# Patient Record
Sex: FEMALE | Race: WHITE | NOT HISPANIC OR LATINO | Employment: OTHER
[De-identification: names, ages, dates, MRNs, and addresses within clinical notes are randomized per-mention and may not be internally consistent; named-entity substitution may affect disease eponyms.]

---

## 2017-02-08 ENCOUNTER — CHARTING TRANS (OUTPATIENT)
Dept: OTHER | Age: 82
End: 2017-02-08

## 2017-02-08 ENCOUNTER — HOSPITAL (OUTPATIENT)
Dept: OTHER | Age: 82
End: 2017-02-08
Attending: EMERGENCY MEDICINE

## 2017-02-08 ENCOUNTER — IMAGING SERVICES (OUTPATIENT)
Dept: OTHER | Age: 82
End: 2017-02-08

## 2017-02-08 LAB
ANALYZER ANC (IANC): ABNORMAL
ANION GAP SERPL CALC-SCNC: 14 MMOL/L (ref 10–20)
BASOPHILS # BLD: 0 THOUSAND/MCL (ref 0–0.3)
BASOPHILS NFR BLD: 0 %
BUN SERPL-MCNC: 34 MG/DL (ref 10–20)
BUN/CREAT SERPL: 32 (ref 7–25)
CALCIUM SERPL-MCNC: 10.1 MG/DL (ref 8.4–10.2)
CHLORIDE: 103 MMOL/L (ref 98–107)
CO2 SERPL-SCNC: 31 MMOL/L (ref 21–32)
CREAT SERPL-MCNC: 1.07 MG/DL (ref 0.51–0.95)
DIFFERENTIAL METHOD BLD: ABNORMAL
EOSINOPHIL # BLD: 0.1 THOUSAND/MCL (ref 0.1–0.5)
EOSINOPHIL NFR BLD: 2 %
ERYTHROCYTE [DISTWIDTH] IN BLOOD: 14 % (ref 11–15)
GLUCOSE SERPL-MCNC: 108 MG/DL (ref 65–99)
HEMATOCRIT: 47.8 % (ref 36–46.5)
HGB BLD-MCNC: 15.6 GM/DL (ref 12–15.5)
LYMPHOCYTES # BLD: 1.8 THOUSAND/MCL (ref 1–4)
LYMPHOCYTES NFR BLD: 36 %
MCH RBC QN AUTO: 30.5 PG (ref 26–34)
MCHC RBC AUTO-ENTMCNC: 32.6 GM/DL (ref 32–36.5)
MCV RBC AUTO: 93.5 FL (ref 78–100)
MONOCYTES # BLD: 0.4 THOUSAND/MCL (ref 0.3–0.9)
MONOCYTES NFR BLD: 8 %
NEUTROPHILS # BLD: 2.7 THOUSAND/MCL (ref 1.8–7.7)
NEUTROPHILS NFR BLD: 54 %
NEUTS SEG NFR BLD: ABNORMAL %
PERCENT NRBC: ABNORMAL
PLATELET # BLD: 176 THOUSAND/MCL (ref 140–450)
POTASSIUM SERPL-SCNC: 3.8 MMOL/L (ref 3.4–5.1)
RBC # BLD: 5.11 MILLION/MCL (ref 4–5.2)
SODIUM SERPL-SCNC: 144 MMOL/L (ref 135–145)
TROPONIN I SERPL HS-MCNC: <0.02 NG/ML
TROPONIN I SERPL HS-MCNC: <0.02 NG/ML
WBC # BLD: 5.1 THOUSAND/MCL (ref 4.2–11)

## 2017-02-09 ENCOUNTER — DIAGNOSTIC TRANS (OUTPATIENT)
Dept: OTHER | Age: 82
End: 2017-02-09

## 2017-04-06 ENCOUNTER — CHARTING TRANS (OUTPATIENT)
Dept: OTHER | Age: 82
End: 2017-04-06

## 2017-04-18 ENCOUNTER — HOSPITAL ENCOUNTER (OUTPATIENT)
Dept: GENERAL RADIOLOGY | Facility: HOSPITAL | Age: 82
Discharge: HOME | End: 2017-04-18
Attending: FAMILY MEDICINE
Payer: MEDICARE

## 2017-04-18 ENCOUNTER — HOSPITAL ENCOUNTER (OUTPATIENT)
Dept: ULTRASOUND IMAGING | Facility: HOSPITAL | Age: 82
Discharge: HOME | End: 2017-04-18
Attending: FAMILY MEDICINE
Payer: MEDICARE

## 2017-04-18 ENCOUNTER — HOSPITAL ENCOUNTER (OUTPATIENT)
Dept: CV DIAGNOSTICS | Facility: HOSPITAL | Age: 82
Discharge: HOME | End: 2017-04-18
Attending: FAMILY MEDICINE
Payer: MEDICARE

## 2017-04-18 DIAGNOSIS — G89.29 CHRONIC HEADACHES: ICD-10-CM

## 2017-04-18 DIAGNOSIS — R06.02 BREATH SHORTNESS: ICD-10-CM

## 2017-04-18 DIAGNOSIS — K44.9 HIATAL HERNIA: ICD-10-CM

## 2017-04-18 DIAGNOSIS — I95.1 ORTHOSTATIC HYPOTENSION: ICD-10-CM

## 2017-04-18 DIAGNOSIS — I65.23 CAROTID ATHEROSCLEROSIS, BILATERAL: ICD-10-CM

## 2017-04-18 DIAGNOSIS — R51.9 CHRONIC HEADACHES: ICD-10-CM

## 2017-04-18 DIAGNOSIS — R06.00 DOE (DYSPNEA ON EXERTION): ICD-10-CM

## 2017-04-18 PROCEDURE — 93306 TTE W/DOPPLER COMPLETE: CPT | Performed by: INTERNAL MEDICINE

## 2017-04-18 PROCEDURE — 93880 EXTRACRANIAL BILAT STUDY: CPT

## 2017-04-18 PROCEDURE — 71020 XR CHEST PA + LAT CHEST (CPT=71020): CPT

## 2017-04-18 PROCEDURE — 93306 TTE W/DOPPLER COMPLETE: CPT

## 2017-10-09 ENCOUNTER — LAB SERVICES (OUTPATIENT)
Dept: OTHER | Age: 82
End: 2017-10-09

## 2017-10-09 ENCOUNTER — CHARTING TRANS (OUTPATIENT)
Dept: OTHER | Age: 82
End: 2017-10-09

## 2017-10-10 LAB
ALBUMIN SERPL-MCNC: 3.9 G/DL (ref 3.6–5.1)
ALBUMIN/GLOB SERPL: 1.1 (ref 1–2.4)
ALP SERPL-CCNC: 92 UNITS/L (ref 45–117)
ALT SERPL-CCNC: 19 UNITS/L
ANION GAP SERPL CALC-SCNC: 21 MMOL/L (ref 10–20)
AST SERPL-CCNC: 29 UNITS/L
BILIRUB SERPL-MCNC: 0.5 MG/DL (ref 0.2–1)
BUN SERPL-MCNC: 33 MG/DL (ref 6–20)
BUN/CREAT SERPL: 32 (ref 7–25)
CALCIUM SERPL-MCNC: 9.6 MG/DL (ref 8.4–10.2)
CHLORIDE SERPL-SCNC: 104 MMOL/L (ref 98–107)
CHOLEST SERPL-MCNC: 228 MG/DL
CHOLEST/HDLC SERPL: 3.4
CO2 SERPL-SCNC: 21 MMOL/L (ref 21–32)
CREAT SERPL-MCNC: 1.02 MG/DL (ref 0.51–0.95)
GLOBULIN SER-MCNC: 3.5 G/DL (ref 2–4)
GLUCOSE SERPL-MCNC: 97 MG/DL (ref 65–99)
HDLC SERPL-MCNC: 68 MG/DL
LDLC SERPL CALC-MCNC: 123 MG/DL
LENGTH OF FAST TIME PATIENT: 10 HRS
LENGTH OF FAST TIME PATIENT: 10 HRS
NONHDLC SERPL-MCNC: 160 MG/DL
POTASSIUM SERPL-SCNC: 4.4 MMOL/L (ref 3.4–5.1)
SODIUM SERPL-SCNC: 142 MMOL/L (ref 135–145)
TOTAL PROTEIN: 7.4 G/DL (ref 6.4–8.2)
TRIGL SERPL-MCNC: 184 MG/DL

## 2017-11-13 ENCOUNTER — LAB SERVICES (OUTPATIENT)
Dept: OTHER | Age: 82
End: 2017-11-13

## 2017-11-13 ENCOUNTER — CHARTING TRANS (OUTPATIENT)
Dept: OTHER | Age: 82
End: 2017-11-13

## 2017-11-14 ENCOUNTER — CHARTING TRANS (OUTPATIENT)
Dept: OTHER | Age: 82
End: 2017-11-14

## 2017-11-14 LAB
ALBUMIN SERPL-MCNC: 3.7 G/DL (ref 3.6–5.1)
ALBUMIN/GLOB SERPL: 1 (ref 1–2.4)
ALP SERPL-CCNC: 86 UNITS/L (ref 45–117)
ALT SERPL-CCNC: 21 UNITS/L
ANION GAP SERPL CALC-SCNC: 14 MMOL/L (ref 10–20)
AST SERPL-CCNC: 24 UNITS/L
BILIRUB SERPL-MCNC: 0.4 MG/DL (ref 0.2–1)
BUN SERPL-MCNC: 32 MG/DL (ref 6–20)
BUN/CREAT SERPL: 24 (ref 7–25)
CALCIUM SERPL-MCNC: 9.5 MG/DL (ref 8.4–10.2)
CHLORIDE SERPL-SCNC: 99 MMOL/L (ref 98–107)
CO2 SERPL-SCNC: 32 MMOL/L (ref 21–32)
CREAT SERPL-MCNC: 1.32 MG/DL (ref 0.51–0.95)
GLOBULIN SER-MCNC: 3.7 G/DL (ref 2–4)
GLUCOSE SERPL-MCNC: 106 MG/DL (ref 65–99)
LENGTH OF FAST TIME PATIENT: 0 HRS
POTASSIUM SERPL-SCNC: 4.5 MMOL/L (ref 3.4–5.1)
SODIUM SERPL-SCNC: 140 MMOL/L (ref 135–145)
TOTAL PROTEIN: 7.4 G/DL (ref 6.4–8.2)

## 2017-11-21 ENCOUNTER — CHARTING TRANS (OUTPATIENT)
Dept: OTHER | Age: 82
End: 2017-11-21

## 2017-11-25 ENCOUNTER — CHARTING TRANS (OUTPATIENT)
Dept: OTHER | Age: 82
End: 2017-11-25

## 2017-11-27 ENCOUNTER — CHARTING TRANS (OUTPATIENT)
Dept: OTHER | Age: 82
End: 2017-11-27

## 2017-12-18 ENCOUNTER — CHARTING TRANS (OUTPATIENT)
Dept: OTHER | Age: 82
End: 2017-12-18

## 2018-01-25 ENCOUNTER — HOSPITAL ENCOUNTER (OUTPATIENT)
Dept: GENERAL RADIOLOGY | Facility: HOSPITAL | Age: 83
Discharge: HOME | End: 2018-01-25
Attending: FAMILY MEDICINE
Payer: MEDICARE

## 2018-01-25 DIAGNOSIS — J90 PLEURAL EFFUSION: ICD-10-CM

## 2018-01-25 PROCEDURE — 71046 X-RAY EXAM CHEST 2 VIEWS: CPT | Performed by: FAMILY MEDICINE

## 2018-02-23 ENCOUNTER — CHARTING TRANS (OUTPATIENT)
Dept: OTHER | Age: 83
End: 2018-02-23

## 2018-02-23 ENCOUNTER — HOSPITAL (OUTPATIENT)
Dept: OTHER | Age: 83
End: 2018-02-23

## 2018-02-23 ENCOUNTER — IMAGING SERVICES (OUTPATIENT)
Dept: OTHER | Age: 83
End: 2018-02-23

## 2018-02-25 ENCOUNTER — CHARTING TRANS (OUTPATIENT)
Dept: OTHER | Age: 83
End: 2018-02-25

## 2018-04-19 ENCOUNTER — CHARTING TRANS (OUTPATIENT)
Dept: OTHER | Age: 83
End: 2018-04-19

## 2018-04-20 ENCOUNTER — CHARTING TRANS (OUTPATIENT)
Dept: OTHER | Age: 83
End: 2018-04-20

## 2018-04-20 ASSESSMENT — PAIN SCALES - GENERAL: PAINLEVEL_OUTOF10: 0

## 2018-06-16 ENCOUNTER — APPOINTMENT (OUTPATIENT)
Dept: CT IMAGING | Facility: HOSPITAL | Age: 83
DRG: 438 | End: 2018-06-16
Attending: EMERGENCY MEDICINE
Payer: MEDICARE

## 2018-06-16 ENCOUNTER — APPOINTMENT (OUTPATIENT)
Dept: ULTRASOUND IMAGING | Facility: HOSPITAL | Age: 83
DRG: 438 | End: 2018-06-16
Attending: EMERGENCY MEDICINE
Payer: MEDICARE

## 2018-06-16 ENCOUNTER — HOSPITAL ENCOUNTER (INPATIENT)
Facility: HOSPITAL | Age: 83
LOS: 23 days | Discharge: HOME HEALTH | DRG: 438 | End: 2018-07-10
Attending: EMERGENCY MEDICINE | Admitting: INTERNAL MEDICINE
Payer: MEDICARE

## 2018-06-16 ENCOUNTER — APPOINTMENT (OUTPATIENT)
Dept: GENERAL RADIOLOGY | Facility: HOSPITAL | Age: 83
DRG: 438 | End: 2018-06-16
Attending: EMERGENCY MEDICINE
Payer: MEDICARE

## 2018-06-16 DIAGNOSIS — K85.90 ACUTE PANCREATITIS, UNSPECIFIED COMPLICATION STATUS, UNSPECIFIED PANCREATITIS TYPE: Primary | ICD-10-CM

## 2018-06-16 LAB
ALBUMIN SERPL BCP-MCNC: 4 G/DL (ref 3.5–4.8)
ALP SERPL-CCNC: 148 U/L (ref 32–100)
ALT SERPL-CCNC: 67 U/L (ref 14–54)
ANION GAP SERPL CALC-SCNC: 14 MMOL/L (ref 0–18)
AST SERPL-CCNC: 128 U/L (ref 15–41)
BASOPHILS # BLD: 0 K/UL (ref 0–0.2)
BASOPHILS NFR BLD: 0 %
BILIRUB DIRECT SERPL-MCNC: 1.9 MG/DL (ref 0–0.2)
BILIRUB SERPL-MCNC: 3.4 MG/DL (ref 0.3–1.2)
BUN SERPL-MCNC: 46 MG/DL (ref 8–20)
BUN/CREAT SERPL: 23.7 (ref 10–20)
CALCIUM SERPL-MCNC: 9.4 MG/DL (ref 8.5–10.5)
CHLORIDE SERPL-SCNC: 97 MMOL/L (ref 95–110)
CO2 SERPL-SCNC: 24 MMOL/L (ref 22–32)
CREAT SERPL-MCNC: 1.94 MG/DL (ref 0.5–1.5)
EOSINOPHIL # BLD: 0 K/UL (ref 0–0.7)
EOSINOPHIL NFR BLD: 0 %
ERYTHROCYTE [DISTWIDTH] IN BLOOD BY AUTOMATED COUNT: 14.4 % (ref 11–15)
GLUCOSE SERPL-MCNC: 209 MG/DL (ref 70–99)
HCT VFR BLD AUTO: 43.5 % (ref 35–48)
HGB BLD-MCNC: 14.8 G/DL (ref 12–16)
LIPASE SERPL-CCNC: 619 U/L (ref 22–51)
LYMPHOCYTES # BLD: 0.4 K/UL (ref 1–4)
LYMPHOCYTES NFR BLD: 4 %
MAGNESIUM SERPL-MCNC: 2.4 MG/DL (ref 1.8–2.5)
MCH RBC QN AUTO: 31.2 PG (ref 27–32)
MCHC RBC AUTO-ENTMCNC: 34 G/DL (ref 32–37)
MCV RBC AUTO: 91.7 FL (ref 80–100)
MONOCYTES # BLD: 0.2 K/UL (ref 0–1)
MONOCYTES NFR BLD: 2 %
NEUTROPHILS # BLD AUTO: 8.1 K/UL (ref 1.8–7.7)
NEUTROPHILS NFR BLD: 94 %
OSMOLALITY UR CALC.SUM OF ELEC: 298 MOSM/KG (ref 275–295)
PLATELET # BLD AUTO: 169 K/UL (ref 140–400)
PMV BLD AUTO: 9.9 FL (ref 7.4–10.3)
POTASSIUM SERPL-SCNC: 3.8 MMOL/L (ref 3.3–5.1)
PROT SERPL-MCNC: 7.9 G/DL (ref 5.9–8.4)
RBC # BLD AUTO: 4.74 M/UL (ref 3.7–5.4)
SODIUM SERPL-SCNC: 135 MMOL/L (ref 136–144)
TROPONIN I SERPL-MCNC: 0.01 NG/ML (ref ?–0.03)
WBC # BLD AUTO: 8.7 K/UL (ref 4–11)

## 2018-06-16 PROCEDURE — 71250 CT THORAX DX C-: CPT | Performed by: EMERGENCY MEDICINE

## 2018-06-16 PROCEDURE — 76705 ECHO EXAM OF ABDOMEN: CPT | Performed by: EMERGENCY MEDICINE

## 2018-06-16 PROCEDURE — 96375 TX/PRO/DX INJ NEW DRUG ADDON: CPT

## 2018-06-16 PROCEDURE — 83735 ASSAY OF MAGNESIUM: CPT | Performed by: EMERGENCY MEDICINE

## 2018-06-16 PROCEDURE — 74176 CT ABD & PELVIS W/O CONTRAST: CPT | Performed by: EMERGENCY MEDICINE

## 2018-06-16 PROCEDURE — 83690 ASSAY OF LIPASE: CPT | Performed by: EMERGENCY MEDICINE

## 2018-06-16 PROCEDURE — 93010 ELECTROCARDIOGRAM REPORT: CPT | Performed by: EMERGENCY MEDICINE

## 2018-06-16 PROCEDURE — 84478 ASSAY OF TRIGLYCERIDES: CPT | Performed by: EMERGENCY MEDICINE

## 2018-06-16 PROCEDURE — 84484 ASSAY OF TROPONIN QUANT: CPT | Performed by: EMERGENCY MEDICINE

## 2018-06-16 PROCEDURE — 85025 COMPLETE CBC W/AUTO DIFF WBC: CPT | Performed by: EMERGENCY MEDICINE

## 2018-06-16 PROCEDURE — 80048 BASIC METABOLIC PNL TOTAL CA: CPT | Performed by: EMERGENCY MEDICINE

## 2018-06-16 PROCEDURE — 93005 ELECTROCARDIOGRAM TRACING: CPT

## 2018-06-16 PROCEDURE — 99285 EMERGENCY DEPT VISIT HI MDM: CPT

## 2018-06-16 PROCEDURE — 96361 HYDRATE IV INFUSION ADD-ON: CPT

## 2018-06-16 PROCEDURE — 96374 THER/PROPH/DIAG INJ IV PUSH: CPT

## 2018-06-16 PROCEDURE — 71045 X-RAY EXAM CHEST 1 VIEW: CPT | Performed by: EMERGENCY MEDICINE

## 2018-06-16 PROCEDURE — 96376 TX/PRO/DX INJ SAME DRUG ADON: CPT

## 2018-06-16 PROCEDURE — 80076 HEPATIC FUNCTION PANEL: CPT | Performed by: EMERGENCY MEDICINE

## 2018-06-16 RX ORDER — MORPHINE SULFATE 4 MG/ML
4 INJECTION, SOLUTION INTRAMUSCULAR; INTRAVENOUS ONCE
Status: COMPLETED | OUTPATIENT
Start: 2018-06-16 | End: 2018-06-16

## 2018-06-16 RX ORDER — AMITRIPTYLINE HYDROCHLORIDE 10 MG/1
10 TABLET, FILM COATED ORAL NIGHTLY
Status: ON HOLD | COMMUNITY
End: 2018-07-06

## 2018-06-16 RX ORDER — ZOLPIDEM TARTRATE 5 MG/1
5 TABLET ORAL NIGHTLY PRN
Status: ON HOLD | COMMUNITY
End: 2018-07-06

## 2018-06-16 RX ORDER — PANTOPRAZOLE SODIUM 40 MG/1
40 TABLET, DELAYED RELEASE ORAL
COMMUNITY

## 2018-06-16 RX ORDER — OMEPRAZOLE 20 MG/1
20 CAPSULE, DELAYED RELEASE ORAL DAILY PRN
Status: ON HOLD | COMMUNITY
End: 2018-07-06

## 2018-06-16 RX ORDER — IRBESARTAN 300 MG/1
300 TABLET ORAL NIGHTLY
COMMUNITY

## 2018-06-16 RX ORDER — BIOTIN 1 MG
5000 TABLET ORAL DAILY
COMMUNITY

## 2018-06-16 RX ORDER — MELATONIN
5000 DAILY
COMMUNITY

## 2018-06-16 RX ORDER — METOPROLOL SUCCINATE 50 MG/1
50 TABLET, EXTENDED RELEASE ORAL DAILY
Status: ON HOLD | COMMUNITY
End: 2018-07-06

## 2018-06-16 RX ORDER — CHOLECALCIFEROL (VITAMIN D3) 25 MCG
TABLET,CHEWABLE ORAL DAILY
COMMUNITY

## 2018-06-16 RX ORDER — EZETIMIBE 10 MG/1
10 TABLET ORAL NIGHTLY
Status: ON HOLD | COMMUNITY
End: 2018-07-06

## 2018-06-16 RX ORDER — MULTIVIT-MIN/FOLIC ACID/LUTEIN 400-250MCG
1 TABLET,CHEWABLE ORAL DAILY
COMMUNITY

## 2018-06-16 RX ORDER — ASPIRIN 81 MG/1
TABLET, CHEWABLE ORAL DAILY
COMMUNITY

## 2018-06-16 RX ORDER — ACETAMINOPHEN 160 MG
2000 TABLET,DISINTEGRATING ORAL DAILY
COMMUNITY

## 2018-06-16 RX ORDER — HYDROCHLOROTHIAZIDE 25 MG/1
25 TABLET ORAL DAILY
Status: ON HOLD | COMMUNITY
End: 2018-07-06

## 2018-06-17 ENCOUNTER — APPOINTMENT (OUTPATIENT)
Dept: MRI IMAGING | Facility: HOSPITAL | Age: 83
DRG: 438 | End: 2018-06-17
Attending: INTERNAL MEDICINE
Payer: MEDICARE

## 2018-06-17 PROBLEM — K85.90 ACUTE PANCREATITIS, UNSPECIFIED COMPLICATION STATUS, UNSPECIFIED PANCREATITIS TYPE: Status: ACTIVE | Noted: 2018-06-17

## 2018-06-17 PROBLEM — K85.90 PANCREATITIS: Status: ACTIVE | Noted: 2018-06-17

## 2018-06-17 LAB
ADENOVIRUS F 40/41 PCR: NEGATIVE
ALBUMIN SERPL BCP-MCNC: 3 G/DL (ref 3.5–4.8)
ALBUMIN/GLOB SERPL: 1 {RATIO} (ref 1–2)
ALP SERPL-CCNC: 153 U/L (ref 32–100)
ALT SERPL-CCNC: 137 U/L (ref 14–54)
ANION GAP SERPL CALC-SCNC: 12 MMOL/L (ref 0–18)
AST SERPL-CCNC: 194 U/L (ref 15–41)
ASTROVIRUS PCR: NEGATIVE
BASOPHILS # BLD: 0 K/UL (ref 0–0.2)
BASOPHILS NFR BLD: 0 %
BILIRUB SERPL-MCNC: 5.8 MG/DL (ref 0.3–1.2)
BUN SERPL-MCNC: 42 MG/DL (ref 8–20)
BUN/CREAT SERPL: 23.5 (ref 10–20)
C CAYETANENSIS DNA SPEC QL NAA+PROBE: NEGATIVE
C. DIFFICILE TOXIN A/B PCR: NEGATIVE
CALCIUM SERPL-MCNC: 8.5 MG/DL (ref 8.5–10.5)
CAMPY SP DNA.DIARRHEA STL QL NAA+PROBE: NEGATIVE
CHLORIDE SERPL-SCNC: 102 MMOL/L (ref 95–110)
CO2 SERPL-SCNC: 25 MMOL/L (ref 22–32)
CREAT SERPL-MCNC: 1.79 MG/DL (ref 0.5–1.5)
CRYPTOSP DNA SPEC QL NAA+PROBE: NEGATIVE
EAEC PAA PLAS AGGR+AATA ST NAA+NON-PRB: NEGATIVE
EC STX1+STX2 + H7 FLIC SPEC NAA+PROBE: NEGATIVE
ENTAMOEBA HISTOLYTICA PCR: NEGATIVE
EOSINOPHIL # BLD: 0.1 K/UL (ref 0–0.7)
EOSINOPHIL NFR BLD: 1 %
EPEC EAE GENE STL QL NAA+NON-PROBE: NEGATIVE
ERYTHROCYTE [DISTWIDTH] IN BLOOD BY AUTOMATED COUNT: 14.6 % (ref 11–15)
ETEC LTA+ST1A+ST1B TOX ST NAA+NON-PROBE: NEGATIVE
GIARDIA LAMBLIA PCR: NEGATIVE
GLOBULIN PLAS-MCNC: 3 G/DL (ref 2.5–3.7)
GLUCOSE SERPL-MCNC: 141 MG/DL (ref 70–99)
HCT VFR BLD AUTO: 38.9 % (ref 35–48)
HGB BLD-MCNC: 13.1 G/DL (ref 12–16)
LYMPHOCYTES # BLD: 0.6 K/UL (ref 1–4)
LYMPHOCYTES NFR BLD: 7 %
MCH RBC QN AUTO: 31.5 PG (ref 27–32)
MCHC RBC AUTO-ENTMCNC: 33.6 G/DL (ref 32–37)
MCV RBC AUTO: 93.7 FL (ref 80–100)
MONOCYTES # BLD: 0.3 K/UL (ref 0–1)
MONOCYTES NFR BLD: 4 %
NEUTROPHILS # BLD AUTO: 7.7 K/UL (ref 1.8–7.7)
NEUTROPHILS NFR BLD: 88 %
NOROVIRUS GI/GII PCR: NEGATIVE
OSMOLALITY UR CALC.SUM OF ELEC: 301 MOSM/KG (ref 275–295)
P SHIGELLOIDES DNA STL QL NAA+PROBE: NEGATIVE
PLATELET # BLD AUTO: 149 K/UL (ref 140–400)
PMV BLD AUTO: 10.3 FL (ref 7.4–10.3)
POTASSIUM SERPL-SCNC: 4 MMOL/L (ref 3.3–5.1)
PROT SERPL-MCNC: 6 G/DL (ref 5.9–8.4)
RBC # BLD AUTO: 4.15 M/UL (ref 3.7–5.4)
ROTAVIRUS A PCR: NEGATIVE
SALMONELLA DNA SPEC QL NAA+PROBE: NEGATIVE
SAPOVIRUS PCR: NEGATIVE
SHIGELLA SP+EIEC IPAH ST NAA+NON-PROBE: NEGATIVE
SODIUM SERPL-SCNC: 139 MMOL/L (ref 136–144)
TRIGL SERPL-MCNC: 126 MG/DL (ref 1–149)
V CHOLERAE DNA SPEC QL NAA+PROBE: NEGATIVE
VIBRIO DNA SPEC NAA+PROBE: NEGATIVE
WBC # BLD AUTO: 8.8 K/UL (ref 4–11)
YERSINIA DNA SPEC NAA+PROBE: NEGATIVE

## 2018-06-17 PROCEDURE — C9113 INJ PANTOPRAZOLE SODIUM, VIA: HCPCS | Performed by: HOSPITALIST

## 2018-06-17 PROCEDURE — 85025 COMPLETE CBC W/AUTO DIFF WBC: CPT | Performed by: INTERNAL MEDICINE

## 2018-06-17 PROCEDURE — 87507 IADNA-DNA/RNA PROBE TQ 12-25: CPT | Performed by: EMERGENCY MEDICINE

## 2018-06-17 PROCEDURE — A4216 STERILE WATER/SALINE, 10 ML: HCPCS | Performed by: INTERNAL MEDICINE

## 2018-06-17 PROCEDURE — A4216 STERILE WATER/SALINE, 10 ML: HCPCS | Performed by: HOSPITALIST

## 2018-06-17 PROCEDURE — 80053 COMPREHEN METABOLIC PANEL: CPT | Performed by: INTERNAL MEDICINE

## 2018-06-17 PROCEDURE — 74181 MRI ABDOMEN W/O CONTRAST: CPT | Performed by: INTERNAL MEDICINE

## 2018-06-17 RX ORDER — HYDRALAZINE HYDROCHLORIDE 20 MG/ML
10 INJECTION INTRAMUSCULAR; INTRAVENOUS EVERY 4 HOURS PRN
Status: DISCONTINUED | OUTPATIENT
Start: 2018-06-17 | End: 2018-07-01

## 2018-06-17 RX ORDER — MORPHINE SULFATE 4 MG/ML
4 INJECTION, SOLUTION INTRAMUSCULAR; INTRAVENOUS ONCE
Status: COMPLETED | OUTPATIENT
Start: 2018-06-17 | End: 2018-06-17

## 2018-06-17 RX ORDER — ONDANSETRON 2 MG/ML
4 INJECTION INTRAMUSCULAR; INTRAVENOUS EVERY 6 HOURS PRN
Status: DISCONTINUED | OUTPATIENT
Start: 2018-06-17 | End: 2018-07-10

## 2018-06-17 RX ORDER — HEPARIN SODIUM 5000 [USP'U]/ML
5000 INJECTION, SOLUTION INTRAVENOUS; SUBCUTANEOUS EVERY 8 HOURS SCHEDULED
Status: DISCONTINUED | OUTPATIENT
Start: 2018-06-17 | End: 2018-06-18

## 2018-06-17 RX ORDER — SODIUM CHLORIDE 0.9 % (FLUSH) 0.9 %
3 SYRINGE (ML) INJECTION AS NEEDED
Status: DISCONTINUED | OUTPATIENT
Start: 2018-06-17 | End: 2018-07-10

## 2018-06-17 RX ORDER — SODIUM CHLORIDE 9 MG/ML
INJECTION, SOLUTION INTRAVENOUS CONTINUOUS
Status: DISCONTINUED | OUTPATIENT
Start: 2018-06-17 | End: 2018-06-22

## 2018-06-17 RX ORDER — HYDROMORPHONE HYDROCHLORIDE 1 MG/ML
0.4 INJECTION, SOLUTION INTRAMUSCULAR; INTRAVENOUS; SUBCUTANEOUS EVERY 2 HOUR PRN
Status: DISCONTINUED | OUTPATIENT
Start: 2018-06-17 | End: 2018-06-20

## 2018-06-17 RX ORDER — HYDROMORPHONE HYDROCHLORIDE 1 MG/ML
0.8 INJECTION, SOLUTION INTRAMUSCULAR; INTRAVENOUS; SUBCUTANEOUS EVERY 2 HOUR PRN
Status: DISCONTINUED | OUTPATIENT
Start: 2018-06-17 | End: 2018-06-17

## 2018-06-17 RX ORDER — HYDROMORPHONE HYDROCHLORIDE 1 MG/ML
0.2 INJECTION, SOLUTION INTRAMUSCULAR; INTRAVENOUS; SUBCUTANEOUS EVERY 2 HOUR PRN
Status: DISCONTINUED | OUTPATIENT
Start: 2018-06-17 | End: 2018-06-20

## 2018-06-17 RX ORDER — SODIUM CHLORIDE, SODIUM LACTATE, POTASSIUM CHLORIDE, CALCIUM CHLORIDE 600; 310; 30; 20 MG/100ML; MG/100ML; MG/100ML; MG/100ML
INJECTION, SOLUTION INTRAVENOUS CONTINUOUS
Status: DISCONTINUED | OUTPATIENT
Start: 2018-06-17 | End: 2018-06-17

## 2018-06-17 RX ORDER — METOCLOPRAMIDE HYDROCHLORIDE 5 MG/ML
10 INJECTION INTRAMUSCULAR; INTRAVENOUS EVERY 8 HOURS PRN
Status: DISCONTINUED | OUTPATIENT
Start: 2018-06-17 | End: 2018-06-18

## 2018-06-17 RX ORDER — HEPARIN SODIUM 5000 [USP'U]/ML
5000 INJECTION, SOLUTION INTRAVENOUS; SUBCUTANEOUS EVERY 8 HOURS SCHEDULED
Status: DISCONTINUED | OUTPATIENT
Start: 2018-06-17 | End: 2018-06-17

## 2018-06-17 NOTE — ED INITIAL ASSESSMENT (HPI)
Abdominal pain radiating to left chest that began today- diarrhea X2 days with n/v.   Hx of hiatal hernia

## 2018-06-17 NOTE — H&P
[facility] Hospitalist H&P     CC: Patient presents with:  Abdomen/Flank Pain (GI/)       PCP: REMI MCCOLLUM, DO, DO      Assessment and Plan     Trina Yoana is a 80year old female with PMH sig for Hiatal hernia, HTN, HL presents with 1 week of intermittent for MRCP. GI and Gen surg following. Patient tells me for the last week has been having diarrhea. Has some mild burning like pain in the upper abdomen. Denies N/V. No CP or SOB.   At baseline lives with DTR, walks with walker, is active and does no aspirin 81 MG Oral Chew Tab Chew by mouth daily. Disp:  Rfl:    Multiple Vitamins-Minerals (ICAPS AREDS 2) Oral Cap Take by mouth 2 (two) times daily.    Disp:  Rfl:        Soc Hx     Smoking status: Never Smoker    Smokeless tobacco: Never Used    Alcoho thickening measures up to 0.4 cm. There is a small quantity of pericholecystic fluid. Sonographic Harp's sign was elicited. BILE DUCTS:   Positive for dilatation, without sonographically discernible obstructing etiology.  The common bile duct measures 1.0 intraparenchymal pathology is suboptimal in the absence of contrast infusion. CARDIAC: The heart is not enlarged. Hypodensity of the intracardiac blood pool relative to the myocardium may relate to underlying oral and anemia.  Aortic and mitral annular calc extending through the hernia has well. A normal caliber appendix is seen without inflammatory manifestations. The colon is largely collapsed and is not well assessed. Scattered colonic diverticula are present in the sigmoid colon.  There is no definite col this noncontrast study. 3. Gallbladder dilatation, which may reflect underlying cholecystitis in the appropriate clinical scenario. Consider followup right upper quadrant sonography if clinically warranted.   4. Bilateral pleural-parenchymal scarring is josue prior and most likely reflects atelectasis or scarring. However, please correlate to exclude signs of superimposed pneumonia. 2. Hyperinflated lungs with prominent markings. 3. Large retrocardiac hiatal hernia. 4. Lesser incidental findings as above.    A p

## 2018-06-17 NOTE — ED NOTES
US at bedside- Endorsed care of patient from [facility] at this time. Patient resting on stretcher- family at bedside. Will continue to monitor.

## 2018-06-17 NOTE — CONSULTS
REFERRING PHYSICIAN: Dr. Vallecillo ref. provider found    HPI:         Thank you very much for requesting me to see the patient.     As you know, Teresa Gordon is a 80year old female who presents today     80year old female complains of 3 days of diarrhea with Bilirubin      0.3 - 1.2 mg/dL 5.8 (H) 3.4 (H)         Current Facility-Administered Medications:  Normal Saline Flush 0.9 % injection 3 mL 3 mL Intravenous PRN   Heparin Sodium (Porcine) 5000 UNIT/ML injection 5,000 Units 5,000 Units Subcutaneous [place] [id number] indicates understanding of these issues and agrees to the plan. Thank you! Stephania Silverman MD.       [facility] Gastroenterology.   ___________________________________________________________  #

## 2018-06-17 NOTE — PLAN OF CARE
Problem: Patient Centered Care  Goal: Patient preferences are identified and integrated in the patient's plan of care  Interventions:  - What would you like us to know as we care for you?  Lives at home with Son and family  - Provide timely, complete, and a tolerated  - Nasogastric tube to low intermittent suction as ordered  - Evaluate effectiveness of ordered antiemetic medications  - Provide nonpharmacologic comfort measures as appropriate  - Advance diet as tolerated, if ordered  - Obtain nutritional cons physical limitations  - Instruct pt to call for assistance with activity based on assessment  - Modify environment to reduce risk of injury  - Provide assistive devices as appropriate  - Consider OT/PT consult to assist with strengthening/mobility  - Encou

## 2018-06-17 NOTE — ED PROVIDER NOTES
Patient Seen in: [facility] AND [facility] Emergency Department     History   Patient presents with:  Abdomen/Flank Pain (GI/)    Stated Complaint: Abdominal pain, n/d    HPI    80year old female complains of 3 days of diarrhea with abdominal \"cramping\" start for stated complaint: Abdominal pain, n/d  Other systems are as noted in HPI. Constitutional and vital signs reviewed. All other systems reviewed and negative except as noted above.     PSFH elements reviewed from today and agreed except as otherwise affect. Her behavior is normal.   Nursing note and vitals reviewed.                 ED Course   Nursing notes and Triage vitals reviewed  Labs Reviewed   LIPASE - Abnormal; Notable for the following:        Result Value    Lipase 619 (*)     All other compo CHEST (CPT=71250)    (Results Pending)  CT ABDOMEN KIDNEY STONE (NO IV) EM (CPT=74176)    (Results Pending)  US GALLBLADDER (JTE=42452)    (Results Pending)    ED Medications Administered:   Medications   sodium chloride 0.9% IV bolus 1,000 mL (1,000 mL In improved. hemodynamic function is no significant change.      Diagnostic Considerations and Interpretation of abnormal or significant results:  ED Course as of Jun 16 2249  ------------------------------------------------------------    Impression:  After including reassessment of blood pressure.      Condition upon leaving the department: Stable

## 2018-06-17 NOTE — PLAN OF CARE
Problem: Patient/Family Goals  Goal: Patient/Family Long Term Goal  Patient's Long Term Goal: To be discharged  Interventions:  - Administer meds as prescribed  - See additional Care Plan goals for specific interventions   Outcome: Progressing    Goal: Yulissa Childers medications  - Encourage mobilization and activity  - Obtain nutritional consult as needed  - Establish a toileting routine/schedule  - Consider collaborating with pharmacy to review patient's medication profile  Outcome: Progressing      Problem: PAIN - A

## 2018-06-18 ENCOUNTER — APPOINTMENT (OUTPATIENT)
Dept: CT IMAGING | Facility: HOSPITAL | Age: 83
DRG: 438 | End: 2018-06-18
Attending: INTERNAL MEDICINE
Payer: MEDICARE

## 2018-06-18 ENCOUNTER — ANESTHESIA (OUTPATIENT)
Dept: ENDOSCOPY | Facility: HOSPITAL | Age: 83
DRG: 438 | End: 2018-06-18
Payer: MEDICARE

## 2018-06-18 ENCOUNTER — ANESTHESIA EVENT (OUTPATIENT)
Dept: ENDOSCOPY | Facility: HOSPITAL | Age: 83
DRG: 438 | End: 2018-06-18
Payer: MEDICARE

## 2018-06-18 ENCOUNTER — APPOINTMENT (OUTPATIENT)
Dept: GENERAL RADIOLOGY | Facility: HOSPITAL | Age: 83
DRG: 438 | End: 2018-06-18
Attending: INTERNAL MEDICINE
Payer: MEDICARE

## 2018-06-18 LAB
ALBUMIN SERPL BCP-MCNC: 2.6 G/DL (ref 3.5–4.8)
ALBUMIN SERPL BCP-MCNC: 2.9 G/DL (ref 3.5–4.8)
ALBUMIN/GLOB SERPL: 0.7 {RATIO} (ref 1–2)
ALBUMIN/GLOB SERPL: 0.8 {RATIO} (ref 1–2)
ALP SERPL-CCNC: 134 U/L (ref 32–100)
ALP SERPL-CCNC: 155 U/L (ref 32–100)
ALT SERPL-CCNC: 102 U/L (ref 14–54)
ALT SERPL-CCNC: 83 U/L (ref 14–54)
ANION GAP SERPL CALC-SCNC: 12 MMOL/L (ref 0–18)
ANION GAP SERPL CALC-SCNC: 16 MMOL/L (ref 0–18)
AST SERPL-CCNC: 102 U/L (ref 15–41)
AST SERPL-CCNC: 133 U/L (ref 15–41)
BASOPHILS # BLD: 0 K/UL (ref 0–0.2)
BASOPHILS NFR BLD: 0 %
BILIRUB SERPL-MCNC: 6.1 MG/DL (ref 0.3–1.2)
BILIRUB SERPL-MCNC: 7.2 MG/DL (ref 0.3–1.2)
BUN SERPL-MCNC: 39 MG/DL (ref 8–20)
BUN SERPL-MCNC: 41 MG/DL (ref 8–20)
BUN/CREAT SERPL: 16 (ref 10–20)
BUN/CREAT SERPL: 16.2 (ref 10–20)
CALCIUM SERPL-MCNC: 8 MG/DL (ref 8.5–10.5)
CALCIUM SERPL-MCNC: 8.6 MG/DL (ref 8.5–10.5)
CHLORIDE SERPL-SCNC: 104 MMOL/L (ref 95–110)
CHLORIDE SERPL-SCNC: 108 MMOL/L (ref 95–110)
CO2 SERPL-SCNC: 21 MMOL/L (ref 22–32)
CO2 SERPL-SCNC: 21 MMOL/L (ref 22–32)
CREAT SERPL-MCNC: 2.44 MG/DL (ref 0.5–1.5)
CREAT SERPL-MCNC: 2.53 MG/DL (ref 0.5–1.5)
EOSINOPHIL # BLD: 0 K/UL (ref 0–0.7)
EOSINOPHIL NFR BLD: 0 %
ERYTHROCYTE [DISTWIDTH] IN BLOOD BY AUTOMATED COUNT: 15.2 % (ref 11–15)
GLOBULIN PLAS-MCNC: 3.1 G/DL (ref 2.5–3.7)
GLOBULIN PLAS-MCNC: 3.9 G/DL (ref 2.5–3.7)
GLUCOSE BLDC GLUCOMTR-MCNC: 91 MG/DL (ref 70–99)
GLUCOSE SERPL-MCNC: 106 MG/DL (ref 70–99)
GLUCOSE SERPL-MCNC: 123 MG/DL (ref 70–99)
HCT VFR BLD AUTO: 36.5 % (ref 35–48)
HGB BLD-MCNC: 12 G/DL (ref 12–16)
LYMPHOCYTES # BLD: 0.4 K/UL (ref 1–4)
LYMPHOCYTES NFR BLD: 7 %
MCH RBC QN AUTO: 30.6 PG (ref 27–32)
MCHC RBC AUTO-ENTMCNC: 32.8 G/DL (ref 32–37)
MCV RBC AUTO: 93.3 FL (ref 80–100)
MONOCYTES # BLD: 0.3 K/UL (ref 0–1)
MONOCYTES NFR BLD: 5 %
NEUTROPHILS # BLD AUTO: 5.8 K/UL (ref 1.8–7.7)
NEUTROPHILS NFR BLD: 88 %
OSMOLALITY UR CALC.SUM OF ELEC: 302 MOSM/KG (ref 275–295)
OSMOLALITY UR CALC.SUM OF ELEC: 303 MOSM/KG (ref 275–295)
PLATELET # BLD AUTO: 117 K/UL (ref 140–400)
PMV BLD AUTO: 9.7 FL (ref 7.4–10.3)
POTASSIUM SERPL-SCNC: 3.4 MMOL/L (ref 3.3–5.1)
POTASSIUM SERPL-SCNC: 3.8 MMOL/L (ref 3.3–5.1)
PROT SERPL-MCNC: 5.7 G/DL (ref 5.9–8.4)
PROT SERPL-MCNC: 6.8 G/DL (ref 5.9–8.4)
RBC # BLD AUTO: 3.91 M/UL (ref 3.7–5.4)
SODIUM SERPL-SCNC: 141 MMOL/L (ref 136–144)
SODIUM SERPL-SCNC: 141 MMOL/L (ref 136–144)
WBC # BLD AUTO: 6.6 K/UL (ref 4–11)

## 2018-06-18 PROCEDURE — 97166 OT EVAL MOD COMPLEX 45 MIN: CPT

## 2018-06-18 PROCEDURE — 85025 COMPLETE CBC W/AUTO DIFF WBC: CPT | Performed by: HOSPITALIST

## 2018-06-18 PROCEDURE — 87641 MR-STAPH DNA AMP PROBE: CPT | Performed by: HOSPITALIST

## 2018-06-18 PROCEDURE — 82962 GLUCOSE BLOOD TEST: CPT

## 2018-06-18 PROCEDURE — 97535 SELF CARE MNGMENT TRAINING: CPT

## 2018-06-18 PROCEDURE — 74330 X-RAY BILE/PANC ENDOSCOPY: CPT | Performed by: INTERNAL MEDICINE

## 2018-06-18 PROCEDURE — 74150 CT ABDOMEN W/O CONTRAST: CPT | Performed by: INTERNAL MEDICINE

## 2018-06-18 PROCEDURE — 71250 CT THORAX DX C-: CPT | Performed by: INTERNAL MEDICINE

## 2018-06-18 PROCEDURE — 80053 COMPREHEN METABOLIC PANEL: CPT | Performed by: INTERNAL MEDICINE

## 2018-06-18 PROCEDURE — A4216 STERILE WATER/SALINE, 10 ML: HCPCS | Performed by: HOSPITALIST

## 2018-06-18 PROCEDURE — 97116 GAIT TRAINING THERAPY: CPT

## 2018-06-18 PROCEDURE — 0F798ZZ DILATION OF COMMON BILE DUCT, VIA NATURAL OR ARTIFICIAL OPENING ENDOSCOPIC: ICD-10-PCS | Performed by: INTERNAL MEDICINE

## 2018-06-18 PROCEDURE — 93010 ELECTROCARDIOGRAM REPORT: CPT | Performed by: ANESTHESIOLOGY

## 2018-06-18 PROCEDURE — 97162 PT EVAL MOD COMPLEX 30 MIN: CPT

## 2018-06-18 PROCEDURE — 0F7D8DZ DILATION OF PANCREATIC DUCT WITH INTRALUMINAL DEVICE, VIA NATURAL OR ARTIFICIAL OPENING ENDOSCOPIC: ICD-10-PCS | Performed by: INTERNAL MEDICINE

## 2018-06-18 PROCEDURE — 93005 ELECTROCARDIOGRAM TRACING: CPT

## 2018-06-18 PROCEDURE — BF141ZZ FLUOROSCOPY OF GALLBLADDER, BILE DUCTS AND PANCREATIC DUCTS USING LOW OSMOLAR CONTRAST: ICD-10-PCS | Performed by: INTERNAL MEDICINE

## 2018-06-18 PROCEDURE — 0FC98ZZ EXTIRPATION OF MATTER FROM COMMON BILE DUCT, VIA NATURAL OR ARTIFICIAL OPENING ENDOSCOPIC: ICD-10-PCS | Performed by: INTERNAL MEDICINE

## 2018-06-18 PROCEDURE — 80053 COMPREHEN METABOLIC PANEL: CPT | Performed by: HOSPITALIST

## 2018-06-18 PROCEDURE — C9113 INJ PANTOPRAZOLE SODIUM, VIA: HCPCS | Performed by: HOSPITALIST

## 2018-06-18 DEVICE — ZIMMON PANCREATIC STENT
Type: IMPLANTABLE DEVICE | Site: PANCREATIC | Status: FUNCTIONAL
Brand: ZIMMON

## 2018-06-18 RX ORDER — METOCLOPRAMIDE HYDROCHLORIDE 5 MG/ML
5 INJECTION INTRAMUSCULAR; INTRAVENOUS EVERY 8 HOURS PRN
Status: DISCONTINUED | OUTPATIENT
Start: 2018-06-18 | End: 2018-07-10

## 2018-06-18 RX ORDER — DEXTROSE MONOHYDRATE 25 G/50ML
50 INJECTION, SOLUTION INTRAVENOUS
Status: DISCONTINUED | OUTPATIENT
Start: 2018-06-18 | End: 2018-06-18 | Stop reason: HOSPADM

## 2018-06-18 RX ORDER — SODIUM CHLORIDE, SODIUM LACTATE, POTASSIUM CHLORIDE, CALCIUM CHLORIDE 600; 310; 30; 20 MG/100ML; MG/100ML; MG/100ML; MG/100ML
INJECTION, SOLUTION INTRAVENOUS CONTINUOUS
Status: DISCONTINUED | OUTPATIENT
Start: 2018-06-18 | End: 2018-06-18

## 2018-06-18 RX ORDER — HYDROCODONE BITARTRATE AND ACETAMINOPHEN 5; 325 MG/1; MG/1
2 TABLET ORAL AS NEEDED
Status: DISCONTINUED | OUTPATIENT
Start: 2018-06-18 | End: 2018-06-18 | Stop reason: HOSPADM

## 2018-06-18 RX ORDER — SODIUM CHLORIDE, SODIUM LACTATE, POTASSIUM CHLORIDE, CALCIUM CHLORIDE 600; 310; 30; 20 MG/100ML; MG/100ML; MG/100ML; MG/100ML
INJECTION, SOLUTION INTRAVENOUS CONTINUOUS PRN
Status: DISCONTINUED | OUTPATIENT
Start: 2018-06-18 | End: 2018-06-18 | Stop reason: SURG

## 2018-06-18 RX ORDER — METOPROLOL TARTRATE 5 MG/5ML
2.5 INJECTION INTRAVENOUS ONCE
Status: DISCONTINUED | OUTPATIENT
Start: 2018-06-18 | End: 2018-06-18 | Stop reason: HOSPADM

## 2018-06-18 RX ORDER — HYDROMORPHONE HYDROCHLORIDE 1 MG/ML
0.2 INJECTION, SOLUTION INTRAMUSCULAR; INTRAVENOUS; SUBCUTANEOUS EVERY 5 MIN PRN
Status: DISCONTINUED | OUTPATIENT
Start: 2018-06-18 | End: 2018-06-18 | Stop reason: HOSPADM

## 2018-06-18 RX ORDER — HYDROCODONE BITARTRATE AND ACETAMINOPHEN 5; 325 MG/1; MG/1
1 TABLET ORAL AS NEEDED
Status: DISCONTINUED | OUTPATIENT
Start: 2018-06-18 | End: 2018-06-18 | Stop reason: HOSPADM

## 2018-06-18 RX ORDER — ONDANSETRON 2 MG/ML
INJECTION INTRAMUSCULAR; INTRAVENOUS AS NEEDED
Status: DISCONTINUED | OUTPATIENT
Start: 2018-06-18 | End: 2018-06-18 | Stop reason: SURG

## 2018-06-18 RX ORDER — ONDANSETRON 2 MG/ML
4 INJECTION INTRAMUSCULAR; INTRAVENOUS ONCE AS NEEDED
Status: DISCONTINUED | OUTPATIENT
Start: 2018-06-18 | End: 2018-06-18 | Stop reason: HOSPADM

## 2018-06-18 RX ORDER — PHENYLEPHRINE HCL 10 MG/ML
VIAL (ML) INJECTION AS NEEDED
Status: DISCONTINUED | OUTPATIENT
Start: 2018-06-18 | End: 2018-06-18 | Stop reason: SURG

## 2018-06-18 RX ORDER — DILTIAZEM HYDROCHLORIDE 5 MG/ML
5 INJECTION INTRAVENOUS ONCE
Status: COMPLETED | OUTPATIENT
Start: 2018-06-18 | End: 2018-06-18

## 2018-06-18 RX ORDER — HYDROMORPHONE HYDROCHLORIDE 1 MG/ML
0.4 INJECTION, SOLUTION INTRAMUSCULAR; INTRAVENOUS; SUBCUTANEOUS EVERY 5 MIN PRN
Status: DISCONTINUED | OUTPATIENT
Start: 2018-06-18 | End: 2018-06-18 | Stop reason: HOSPADM

## 2018-06-18 RX ORDER — NALOXONE HYDROCHLORIDE 0.4 MG/ML
80 INJECTION, SOLUTION INTRAMUSCULAR; INTRAVENOUS; SUBCUTANEOUS AS NEEDED
Status: DISCONTINUED | OUTPATIENT
Start: 2018-06-18 | End: 2018-06-18 | Stop reason: HOSPADM

## 2018-06-18 RX ORDER — HYDROMORPHONE HYDROCHLORIDE 1 MG/ML
0.6 INJECTION, SOLUTION INTRAMUSCULAR; INTRAVENOUS; SUBCUTANEOUS EVERY 5 MIN PRN
Status: DISCONTINUED | OUTPATIENT
Start: 2018-06-18 | End: 2018-06-18 | Stop reason: HOSPADM

## 2018-06-18 RX ORDER — ESMOLOL HYDROCHLORIDE 10 MG/ML
INJECTION INTRAVENOUS AS NEEDED
Status: DISCONTINUED | OUTPATIENT
Start: 2018-06-18 | End: 2018-06-18 | Stop reason: SURG

## 2018-06-18 RX ADMIN — ESMOLOL HYDROCHLORIDE 20 MG: 10 INJECTION INTRAVENOUS at 18:51:00

## 2018-06-18 RX ADMIN — PHENYLEPHRINE HCL 100 MCG: 10 MG/ML VIAL (ML) INJECTION at 18:26:00

## 2018-06-18 RX ADMIN — ESMOLOL HYDROCHLORIDE 20 MG: 10 INJECTION INTRAVENOUS at 18:55:00

## 2018-06-18 RX ADMIN — PHENYLEPHRINE HCL 100 MCG: 10 MG/ML VIAL (ML) INJECTION at 17:55:00

## 2018-06-18 RX ADMIN — SODIUM CHLORIDE, SODIUM LACTATE, POTASSIUM CHLORIDE, CALCIUM CHLORIDE: 600; 310; 30; 20 INJECTION, SOLUTION INTRAVENOUS at 17:18:00

## 2018-06-18 RX ADMIN — ONDANSETRON 4 MG: 2 INJECTION INTRAMUSCULAR; INTRAVENOUS at 18:41:00

## 2018-06-18 RX ADMIN — PHENYLEPHRINE HCL 100 MCG: 10 MG/ML VIAL (ML) INJECTION at 18:02:00

## 2018-06-18 NOTE — PLAN OF CARE
Problem: Patient Centered Care  Goal: Patient preferences are identified and integrated in the patient's plan of care  Interventions:  - What would you like us to know as we care for you?  Lives at home with Son and family  - Provide timely, complete, and a CO2 retention   Outcome: Progressing  Currently on 1L NC, o2 sats in upper 90's.      Problem: GASTROINTESTINAL - ADULT  Goal: Minimal or absence of nausea and vomiting  INTERVENTIONS:  - Maintain adequate hydration with IV or PO as ordered and tolerated  - injury  INTERVENTIONS:  - Assess pt frequently for physical needs  - Identify cognitive and physical deficits and behaviors that affect risk of falls.   - Jerome fall precautions as indicated by assessment.  - Educate pt/family on patient safety includin

## 2018-06-18 NOTE — OCCUPATIONAL THERAPY NOTE
OCCUPATIONAL THERAPY EVALUATION - INPATIENT     Room Number: 539/539-A  Evaluation Date: 6/18/2018  Type of Evaluation: Initial  Presenting Problem: pancreatitis     Physician Order: IP Consult to Occupational Therapy  Reason for Therapy: ADL/IADL Dysfunct activities; ADL training;Functional transfer training;UE strengthening/ROM; Endurance training;Equipment eval/education       OCCUPATIONAL THERAPY MEDICAL/SOCIAL HISTORY     Problem List  Principal Problem:    Acute pancreatitis, unspecified complication sta members appearing distressed; will screen cognition at further date     PERCEPTION  Overall Perception Status:   WFL - within functional limits    SENSATION  Light touch:  intact    Communication: expressive and receptive communication appear [facility] functional transfer with SPV  Comment:     Patient will complete toileting with SPV  Comment:     Patient will complete grooming in standing at the sink x 3 min w/ SBA  Comment:    Patient will complete LE dressing w/ SBA and LHAE PRN  Comment:          Go

## 2018-06-18 NOTE — CONSULTS
[facility]    Report of Consultation    Milo Alvin Patient Status:  Inpatient    1925 MRN [id number]   Location [facility] 5SW/SE Attending Maycol Hare MD   Hosp Day # 0 PCP REMI MCCOLLUM DO, DO     Date of Admiss g, Intravenous, Q12H  •  Pantoprazole Sodium (PROTONIX) 40 mg in Sodium Chloride 0.9 % 10 mL IV push, 40 mg, Intravenous, Daily  •  Heparin Sodium (Porcine) 5000 UNIT/ML injection 5,000 Units, 5,000 Units, Subcutaneous, Q8H [facility]  •  hydrALAzine HCl (APRESOL (HQC=80254)    Result Date: 6/17/2018  CONCLUSION:  1. Gallbladder distention with sludge, gallbladder wall thickening, and pericholecystic fluid with positive sonographic Harp's sign.  Findings may be reactive to acute pancreatitis, reflective of underly by (CST): Mariel Tracy MD on 6/17/2018 at 15:33     Approved by (CST): Mariel Tracy MD on 6/17/2018 at 15:51          Ct Chest+abdomen+pelvis(cpt=71250/75916)    Result Date: 6/17/2018  CONCLUSION:  1.  Large hiatal hernia with complete intrathoracic issued by the [facility] Radiology teleradiology service. There are no major discrepancies.    Dictated by (CST): Sean Arias MD on 6/17/2018 at 8:47     Approved by (CST): Sean Arias MD on 6/17/2018 at 8:50          Ekg 12-lead    Result Date: 6/16/20

## 2018-06-18 NOTE — PROGRESS NOTES
[facility] Pharmacy Note:  Renal Dose Adjustment for Metoclopramide (REGLAN)    Ange Pandya has been prescribed Metoclopramide (REGLAN) 10 mg every 8 hours as needed for nausea.     Estimated Creatinine Clearance: 11.6 mL/min (A) (based on SCr of 2.44 mg/dL (H

## 2018-06-18 NOTE — PLAN OF CARE
Problem: Patient Centered Care  Goal: Patient preferences are identified and integrated in the patient's plan of care  Interventions:  - What would you like us to know as we care for you?  Lives at home with Son and family  - Provide timely, complete, and a Maintain adequate hydration with IV or PO as ordered and tolerated  - Nasogastric tube to low intermittent suction as ordered  - Evaluate effectiveness of ordered antiemetic medications  - Provide nonpharmacologic comfort measures as appropriate  - Advance assessment  - Modify environment to reduce risk of injury  - Provide assistive devices as appropriate  - Consider OT/PT consult to assist with strengthening/mobility  - Encourage toileting schedule   Outcome: Progressing  Bed locked/lowest position.  Bed al

## 2018-06-18 NOTE — PROGRESS NOTES
[facility] Hospitalist Progress Note     CC: Hospital Follow up    PCP: REMI MCCOLLUM DO,        Assessment/Plan:     Principal Problem:    Acute pancreatitis, unspecified complication status, unspecified pancreatitis type  Active Problems:    Acute pancreatitis m), weight 156 lb 8 oz (71 kg), SpO2 97 %.     Temp:  [97.4 °F (36.3 °C)-98.9 °F (37.2 °C)] 98.3 °F (36.8 °C)  Pulse:  [] 107  Resp:  [18-20] 20  BP: (107-144)/(46-96) 130/59      Intake/Output:    Intake/Output Summary (Last 24 hours) at 06/18/18 131 without sonographically discernible obstructing etiology. Consider followup MRCP for further evaluation. A preliminary report was issued by the [address] [place] [address] Radiology teleradiology service. There are no major discrepancies.    Dictated by (CST): Jennifer Perrin of the transverse colon. If there is concern for gastric outlet obstruction, nasogastric tube advancement and surgical consultation could be considered.   2. Findings suggestive of acute interstitial edematous pancreatitis without evidence of acute peripanc • sodium chloride 125 mL/hr at 06/18/18 1129     Metoclopramide HCl, Normal Saline Flush, HYDROmorphone HCl **OR** HYDROmorphone HCl **OR** [DISCONTINUED] HYDROmorphone HCl, ondansetron HCl, hydrALAzine HCl

## 2018-06-18 NOTE — PHYSICAL THERAPY NOTE
PHYSICAL THERAPY EVALUATION - INPATIENT     Room Number: [facility] ENDO POOL ROOM/[facility] ENDO POOL ROOM  Evaluation Date: 6/18/2018  Type of Evaluation: Initial   Physician Order: PT Eval and Treat    Presenting Problem: acute pancreatitis  Reason for Therapy:  Mob Past Surgical History  Past Surgical History:  No date: CATH BARE METAL STENT (BMS)  No date: UPPER GI ENDOSCOPY,EXAM    HOME SITUATION  Type of Home: House   Home Layout: Multi-level  Stairs to Enter : 5  Railing: Yes  Stairs to Bedroom: 2  Railing (e.g., wheelchair, bedside commode, etc.): A Little   -   Moving from lying on back to sitting on the side of the bed?: A Little   How much help from another person does the patient currently need. ..   -   Moving to and from a bed to a chair (including a w

## 2018-06-18 NOTE — PROGRESS NOTES
[facility]  Gastroenterology Progress Note    Teresa Gordon Patient Status:  Inpatient    1925 MRN [id number]   Location [facility] 5SW/SE Attending Mirian Ricci MD   Hosp Day # 1 PCP REMI MCCOLLUM DO,      Subjective:   Tanya Tarango Didi Zamora MD on 6/17/2018 at 6:17          Mri Abdomen+mrcp  (cpt=74181)    Result Date: 6/17/2018  CONCLUSION:  1. Mild extrahepatic ductal dilation with the common bile duct measuring 7 mm.  Subtle 2 mm filling defect within the distal common bile duct jus noncontrast study. Potential pancreatic necrosis cannot be ascertained on this noncontrast study. 3. Gallbladder dilatation, which may reflect underlying cholecystitis in the appropriate clinical scenario.  Consider followup right upper quadrant sonography exhibits confusion otherwise HD stable.     -Will discuss EUS/ERCP with Dr Terrie Mata likely for later today  -c/w zosyn  -Trend LFT's  -NPO, IV hydration    Eddie Elmore MD  6/18/2018  8:53 AM

## 2018-06-18 NOTE — OPERATIVE REPORT
OPERATIVE REPORT   PATIENT NAME: Petar Barrera  MRN: [id number]  DATE OF OPERATION: 6/18/2018  PREOPERATIVE DIAGNOSIS:   1. Gallstone pancreatitis with rising liver enzymes and MRI MRCP suggestive of common bile duct stones.   POSTOPERATIVE DIAGNOSES:  1 Upon entering the duodenum there appeared to be small amount of blood extending from the major papilla.   Was in the mid Dash bili sphincterotome the ampulla orifice was cannulated and gentle retrograde contrast injection revealed filling of normal-appeari left lateral decubitus position on the Olympus upper endoscope was introduced under digitalization in the esophagus passed into the stomach. In the proximal aspect of the stomach the linear mucosal tear was then seen.   Utilizing the bili balloon catheter

## 2018-06-18 NOTE — PROGRESS NOTES
[facility]    Progress Note    Contreras Bolden Patient Status:  Inpatient    1925 MRN [id number]   Location [facility] 5SW/SE Attending Criselda Hartman MD   Hosp Day # 1 PCP REMI MCCOLLUM, , DO     Subjective:     Pt with family a Intravenous Q12H   • pantoprazole (PROTONIX) IV push  40 mg Intravenous Daily       Results:       Recent Labs   Lab  06/16/18   2106  06/17/18   0741   RBC  4.74  4.15   HGB  14.8  13.1   HCT  43.5  38.9   MCV  91.7  93.7   MCH  31.2  31.5   MCHC  34.0  3 suggesting acute cholecystitis including gallbladder luminal distention and pericholecystic edema as well as layering dependent sludge within the gallbladder. 3. Acute interstitial edematous pancreatitis involving the pancreatic head and body.  No well-defi CT evidence of acute complication. 8. Suspected postoperative changes of hysterectomy. 9. Lesser incidental findings as above. A preliminary report was issued by the [address] [place] [address] Radiology teleradiology service. There are no major discrepancies.    Dictated

## 2018-06-19 ENCOUNTER — APPOINTMENT (OUTPATIENT)
Dept: CV DIAGNOSTICS | Facility: HOSPITAL | Age: 83
DRG: 438 | End: 2018-06-19
Attending: HOSPITALIST
Payer: MEDICARE

## 2018-06-19 ENCOUNTER — APPOINTMENT (OUTPATIENT)
Dept: GENERAL RADIOLOGY | Facility: HOSPITAL | Age: 83
DRG: 438 | End: 2018-06-19
Attending: HOSPITALIST
Payer: MEDICARE

## 2018-06-19 LAB
ALBUMIN SERPL BCP-MCNC: 2.1 G/DL (ref 3.5–4.8)
ALBUMIN/GLOB SERPL: 0.7 {RATIO} (ref 1–2)
ALP SERPL-CCNC: 115 U/L (ref 32–100)
ALT SERPL-CCNC: 66 U/L (ref 14–54)
ANION GAP SERPL CALC-SCNC: 12 MMOL/L (ref 0–18)
AST SERPL-CCNC: 89 U/L (ref 15–41)
BASOPHILS # BLD: 0 K/UL (ref 0–0.2)
BASOPHILS NFR BLD: 0 %
BILIRUB SERPL-MCNC: 5.1 MG/DL (ref 0.3–1.2)
BILIRUB UR QL: NEGATIVE
BUN SERPL-MCNC: 42 MG/DL (ref 8–20)
BUN/CREAT SERPL: 16.2 (ref 10–20)
CALCIUM SERPL-MCNC: 7.3 MG/DL (ref 8.5–10.5)
CHLORIDE SERPL-SCNC: 108 MMOL/L (ref 95–110)
CO2 SERPL-SCNC: 19 MMOL/L (ref 22–32)
COLOR UR: YELLOW
CREAT SERPL-MCNC: 2.59 MG/DL (ref 0.5–1.5)
EOSINOPHIL # BLD: 0 K/UL (ref 0–0.7)
EOSINOPHIL NFR BLD: 0 %
ERYTHROCYTE [DISTWIDTH] IN BLOOD BY AUTOMATED COUNT: 15.4 % (ref 11–15)
GLOBULIN PLAS-MCNC: 2.9 G/DL (ref 2.5–3.7)
GLUCOSE SERPL-MCNC: 119 MG/DL (ref 70–99)
GLUCOSE UR-MCNC: 50 MG/DL
HCT VFR BLD AUTO: 33 % (ref 35–48)
HGB BLD-MCNC: 10.8 G/DL (ref 12–16)
HGB UR QL STRIP.AUTO: NEGATIVE
LYMPHOCYTES # BLD: 0.4 K/UL (ref 1–4)
LYMPHOCYTES NFR BLD: 7 %
MAGNESIUM SERPL-MCNC: 1.9 MG/DL (ref 1.8–2.5)
MCH RBC QN AUTO: 30.7 PG (ref 27–32)
MCHC RBC AUTO-ENTMCNC: 32.8 G/DL (ref 32–37)
MCV RBC AUTO: 93.8 FL (ref 80–100)
MONOCYTES # BLD: 0.3 K/UL (ref 0–1)
MONOCYTES NFR BLD: 6 %
MRSA DNA SPEC QL NAA+PROBE: NEGATIVE
NEUTROPHILS # BLD AUTO: 4.9 K/UL (ref 1.8–7.7)
NEUTROPHILS NFR BLD: 87 %
NITRITE UR QL STRIP.AUTO: NEGATIVE
OSMOLALITY UR CALC.SUM OF ELEC: 300 MOSM/KG (ref 275–295)
PH UR: 5 [PH] (ref 5–8)
PLATELET # BLD AUTO: 117 K/UL (ref 140–400)
PMV BLD AUTO: 9.8 FL (ref 7.4–10.3)
POTASSIUM SERPL-SCNC: 3.3 MMOL/L (ref 3.3–5.1)
PROT SERPL-MCNC: 5 G/DL (ref 5.9–8.4)
PROT UR-MCNC: 30 MG/DL
RBC # BLD AUTO: 3.52 M/UL (ref 3.7–5.4)
RBC #/AREA URNS AUTO: 1 /HPF
SODIUM SERPL-SCNC: 139 MMOL/L (ref 136–144)
SP GR UR STRIP: 1.01 (ref 1–1.03)
UROBILINOGEN UR STRIP-ACNC: 4
VIT C UR-MCNC: NEGATIVE MG/DL
WBC # BLD AUTO: 5.6 K/UL (ref 4–11)
WBC #/AREA URNS AUTO: 7 /HPF

## 2018-06-19 PROCEDURE — 3E0F7GC INTRODUCTION OF OTHER THERAPEUTIC SUBSTANCE INTO RESPIRATORY TRACT, VIA NATURAL OR ARTIFICIAL OPENING: ICD-10-PCS | Performed by: HOSPITALIST

## 2018-06-19 PROCEDURE — 87086 URINE CULTURE/COLONY COUNT: CPT | Performed by: HOSPITALIST

## 2018-06-19 PROCEDURE — 94640 AIRWAY INHALATION TREATMENT: CPT

## 2018-06-19 PROCEDURE — 97530 THERAPEUTIC ACTIVITIES: CPT

## 2018-06-19 PROCEDURE — 93306 TTE W/DOPPLER COMPLETE: CPT | Performed by: HOSPITALIST

## 2018-06-19 PROCEDURE — 81001 URINALYSIS AUTO W/SCOPE: CPT | Performed by: HOSPITALIST

## 2018-06-19 PROCEDURE — 85025 COMPLETE CBC W/AUTO DIFF WBC: CPT | Performed by: HOSPITALIST

## 2018-06-19 PROCEDURE — 71045 X-RAY EXAM CHEST 1 VIEW: CPT | Performed by: HOSPITALIST

## 2018-06-19 PROCEDURE — A4216 STERILE WATER/SALINE, 10 ML: HCPCS | Performed by: HOSPITALIST

## 2018-06-19 PROCEDURE — 83735 ASSAY OF MAGNESIUM: CPT | Performed by: HOSPITALIST

## 2018-06-19 PROCEDURE — 80053 COMPREHEN METABOLIC PANEL: CPT | Performed by: HOSPITALIST

## 2018-06-19 PROCEDURE — C9113 INJ PANTOPRAZOLE SODIUM, VIA: HCPCS | Performed by: HOSPITALIST

## 2018-06-19 PROCEDURE — 97535 SELF CARE MNGMENT TRAINING: CPT

## 2018-06-19 PROCEDURE — 97116 GAIT TRAINING THERAPY: CPT

## 2018-06-19 RX ORDER — IPRATROPIUM BROMIDE AND ALBUTEROL SULFATE 2.5; .5 MG/3ML; MG/3ML
3 SOLUTION RESPIRATORY (INHALATION) EVERY 6 HOURS PRN
Status: DISCONTINUED | OUTPATIENT
Start: 2018-06-19 | End: 2018-07-10

## 2018-06-19 RX ORDER — IPRATROPIUM BROMIDE AND ALBUTEROL SULFATE 2.5; .5 MG/3ML; MG/3ML
SOLUTION RESPIRATORY (INHALATION)
Status: COMPLETED
Start: 2018-06-19 | End: 2018-06-19

## 2018-06-19 RX ORDER — SODIUM CHLORIDE 9 MG/ML
INJECTION, SOLUTION INTRAVENOUS
Status: COMPLETED
Start: 2018-06-19 | End: 2018-06-19

## 2018-06-19 RX ORDER — METOPROLOL TARTRATE 5 MG/5ML
5 INJECTION INTRAVENOUS EVERY 6 HOURS
Status: DISCONTINUED | OUTPATIENT
Start: 2018-06-19 | End: 2018-06-24

## 2018-06-19 RX ORDER — ZOLPIDEM TARTRATE 5 MG/1
5 TABLET ORAL NIGHTLY PRN
Status: COMPLETED | OUTPATIENT
Start: 2018-06-19 | End: 2018-06-19

## 2018-06-19 NOTE — PHYSICAL THERAPY NOTE
PHYSICAL THERAPY TREATMENT NOTE - INPATIENT     Room Number: 920/078-F       Presenting Problem: acute pancreatis s/p ERCP 6/18    Problem List  Principal Problem:    Acute pancreatitis, unspecified complication status, unspecified pancreatitis type  Activ Bearing Restriction: None                PAIN ASSESSMENT   Rating: Unable to rate  Location: stomach   Management Techniques: Activity promotion; Body mechanics;Repositioning    BALANCE Goal #2  Current Status CGA with rolling walker    Goal #3 Patient is able to ambulate 150 feet with assist device: walker - rolling at assistance level: supervision   Goal #3   Current Status 60ft with rolling walker CGA to min A x 1    Goal #4 Patient

## 2018-06-19 NOTE — PROGRESS NOTES
ASSESSMENT/PLAN:    Impression: 1. Atrial fibrillation with rapid ventricular response is now converted to sinus rhythm. 2.  Acute gallstone pancreatitis with volvulus.   3.  History of coronary disease post stents in the past.  4.  History of hyperten CV:see HPI. RESP:denies chronic cough, hemoptysis. GI:denies melena, hematochezia. :denies hematuria. INTEG:no new rashes. MS:no limiting arthritis. NEURO:no localized deficits. HEME/LYMPH:denies easy bruising. ALL:no new allergies.  ROS otherwise negativ

## 2018-06-19 NOTE — ANESTHESIA POSTPROCEDURE EVALUATION
Patient:  Mendy Enrique    Procedure Summary     Date:  06/18/18 Room / Location:  [facility] ENDOSCOPY 01 / [facility] ENDOSCOPY    Anesthesia Start:  8840 Anesthesia Stop:      Procedures:       ENDOSCOPIC RETROGRADE CHOLANGIOPANCREATOGRAPHY (ERCP) (N/A )      Miguel Gorman

## 2018-06-19 NOTE — PROGRESS NOTES
[facility]  Gastroenterology Progress Note    Emory Violetanna Patient Status:  Inpatient    1925 MRN [id number]   Location [facility] 2W/SW Attending Davy Suarez MD   [facility] Day # 2 PCP REMI MCCOLLUM DO, DO     Subjective:   Bernardo [facility] distal common bile duct just above the level of the ampulla (series 5, image 20 and series 12, image 20), raising suspicion for a small focus of choledocholithiasis. No additional dominant choledocholithiasis. No pancreatic ductal dilation.  2. Findings sug Dilatation of the main pulmonary artery trunk may relate to underlying pulmonary hypertension. 9. Mild hepatomegaly. 10. Low-density appearance of the intracardiac blood pool raises the possibility of underlying anemia.  Correlate with hematologic paramet

## 2018-06-19 NOTE — PROGRESS NOTES
[facility] Hospitalist Progress Note     CC: Hospital Follow up    PCP: REMI MCCOLLUM DO,        Assessment/Plan:     Principal Problem:    Acute pancreatitis, unspecified complication status, unspecified pancreatitis type  Active Problems:    Acute pancreatitis Confirmed with pt    Questions/concerns were discussed with patient and/or family by bedside.       Thank Shanique Dumont MD    [facility] Hospitalist     Subjective:     Feels much better this morning, more alert and oriented, no chest pain or sob     OBJECTIVE: Recent Labs   Lab  06/16/18   2106  06/17/18   0741  06/18/18   0632  06/18/18   1412  06/19/18   0404   ALT  67*  137*  102*  83*  66*   AST  128*  194*  133*  102*  89*   ALB  4.0  3.0*  2.9*  2.6*  2.1*         Imaging:  Us Gallbladder (cpt=76705) interstitial edematous pancreatitis involving the pancreatic head and body. No well-defined/drainable peripancreatic fluid collection.  Please note that assessment for pancreatic necrosis and adjacent vascular compromise is not possible without benefit of i the [facility] Radiology teleradiology service. There are no major discrepancies.    Dictated by (CST): Kandice Lewis MD on 6/17/2018 at 8:44     Approved by (CST): Kandice Lewis MD on 6/17/2018 at 9:00          Ct Chest Abdomen (oll=[id number])    Result hernia. 4. Lesser incidental findings as above. A preliminary report was issued by the [address] [place] [address] Radiology teleradiology service. There are no major discrepancies.    Dictated by (CST): Yasemin Boland MD on 6/17/2018 at 8:47     Approved by (CST): Alyssa Wynn

## 2018-06-19 NOTE — DIETARY NOTE
ADULT NUTRITION INITIAL ASSESSMENT    Pt is at moderate nutrition risk. Pt does not meet malnutrition criteria. RECOMMENDATIONS TO MD:  See Nutrition Intervention. Diet advance when appropriate.       NUTRITION DIAGNOSIS/PROBLEM:  Inadequate oral in type [K85.90]    PERTINENT PAST MEDICAL HISTORY:   Past Medical History:   Diagnosis Date   • Hiatal hernia    • High blood pressure    • Hyperlipidemia        ANTHROPOMETRICS:  HT: 157.5 cm (5' 2\")  WT: 76 kg (167 lb 8.8 oz)  This is up likely d/t fluid nourished per visual exam.  - Fluid Accumulation: lower extremity edema per visual exam  - Skin Integrity: intact.  - Michael score 18    NUTRITION PRESCRIPTION:  Diet: NPO  Oral Supplements: NPO  ESTIMATED NUTRITION NEEDS:  Calories: ~1486-1212 calories/da

## 2018-06-19 NOTE — OCCUPATIONAL THERAPY NOTE
OCCUPATIONAL THERAPY TREATMENT NOTE - INPATIENT        Room Number: 206/696-U           Presenting Problem: pancreatitis     Problem List  Principal Problem:    Acute pancreatitis, unspecified complication status, unspecified pancreatitis type  Active Prob currently need…  -   Putting on and taking off regular lower body clothing?: A Little  -   Bathing (including washing, rinsing, drying)?: A Little  -   Toileting, which includes using toilet, bedpan or urinal? : A Little  -   Putting on and taking off regu

## 2018-06-20 ENCOUNTER — APPOINTMENT (OUTPATIENT)
Dept: ULTRASOUND IMAGING | Facility: HOSPITAL | Age: 83
DRG: 438 | End: 2018-06-20
Attending: INTERNAL MEDICINE
Payer: MEDICARE

## 2018-06-20 ENCOUNTER — APPOINTMENT (OUTPATIENT)
Dept: ULTRASOUND IMAGING | Facility: HOSPITAL | Age: 83
DRG: 438 | End: 2018-06-20
Attending: SURGERY
Payer: MEDICARE

## 2018-06-20 LAB
ALBUMIN SERPL BCP-MCNC: 2.2 G/DL (ref 3.5–4.8)
ALP SERPL-CCNC: 124 U/L (ref 32–100)
ALT SERPL-CCNC: 91 U/L (ref 14–54)
ANION GAP SERPL CALC-SCNC: 11 MMOL/L (ref 0–18)
AST SERPL-CCNC: 121 U/L (ref 15–41)
BACTERIA UR QL AUTO: NEGATIVE /HPF
BASOPHILS # BLD: 0 K/UL (ref 0–0.2)
BASOPHILS NFR BLD: 0 %
BILIRUB DIRECT SERPL-MCNC: 3.5 MG/DL (ref 0–0.2)
BILIRUB SERPL-MCNC: 5.2 MG/DL (ref 0.3–1.2)
BILIRUB UR QL: NEGATIVE
BUN SERPL-MCNC: 45 MG/DL (ref 8–20)
BUN/CREAT SERPL: 17 (ref 10–20)
CALCIUM SERPL-MCNC: 7.9 MG/DL (ref 8.5–10.5)
CHLORIDE SERPL-SCNC: 110 MMOL/L (ref 95–110)
CLARITY UR: CLEAR
CO2 SERPL-SCNC: 19 MMOL/L (ref 22–32)
COLOR UR: YELLOW
CREAT SERPL-MCNC: 2.65 MG/DL (ref 0.5–1.5)
EOSINOPHIL # BLD: 0.1 K/UL (ref 0–0.7)
EOSINOPHIL NFR BLD: 1 %
ERYTHROCYTE [DISTWIDTH] IN BLOOD BY AUTOMATED COUNT: 15.4 % (ref 11–15)
GLUCOSE SERPL-MCNC: 100 MG/DL (ref 70–99)
GLUCOSE UR-MCNC: NEGATIVE MG/DL
HCT VFR BLD AUTO: 34 % (ref 35–48)
HGB BLD-MCNC: 11.1 G/DL (ref 12–16)
KETONES UR-MCNC: NEGATIVE MG/DL
LEUKOCYTE ESTERASE UR QL STRIP.AUTO: NEGATIVE
LYMPHOCYTES # BLD: 0.4 K/UL (ref 1–4)
LYMPHOCYTES NFR BLD: 7 %
MAGNESIUM SERPL-MCNC: 1.9 MG/DL (ref 1.8–2.5)
MAGNESIUM SERPL-MCNC: 1.9 MG/DL (ref 1.8–2.5)
MCH RBC QN AUTO: 30.7 PG (ref 27–32)
MCHC RBC AUTO-ENTMCNC: 32.8 G/DL (ref 32–37)
MCV RBC AUTO: 93.7 FL (ref 80–100)
MONOCYTES # BLD: 0.4 K/UL (ref 0–1)
MONOCYTES NFR BLD: 6 %
NEUTROPHILS # BLD AUTO: 4.7 K/UL (ref 1.8–7.7)
NEUTROPHILS NFR BLD: 86 %
NITRITE UR QL STRIP.AUTO: NEGATIVE
OSMOLALITY UR CALC.SUM OF ELEC: 302 MOSM/KG (ref 275–295)
PH UR: 5 [PH] (ref 5–8)
PLATELET # BLD AUTO: 128 K/UL (ref 140–400)
PMV BLD AUTO: 9.3 FL (ref 7.4–10.3)
POTASSIUM SERPL-SCNC: 2.7 MMOL/L (ref 3.3–5.1)
POTASSIUM SERPL-SCNC: 3 MMOL/L (ref 3.3–5.1)
PROT SERPL-MCNC: 5.6 G/DL (ref 5.9–8.4)
PROT UR-MCNC: NEGATIVE MG/DL
RBC # BLD AUTO: 3.63 M/UL (ref 3.7–5.4)
RBC #/AREA URNS AUTO: 7 /HPF
SODIUM SERPL-SCNC: 140 MMOL/L (ref 136–144)
SP GR UR STRIP: 1.01 (ref 1–1.03)
UROBILINOGEN UR STRIP-ACNC: 2
VIT C UR-MCNC: NEGATIVE MG/DL
WBC # BLD AUTO: 5.5 K/UL (ref 4–11)
WBC #/AREA URNS AUTO: 4 /HPF

## 2018-06-20 PROCEDURE — 84132 ASSAY OF SERUM POTASSIUM: CPT | Performed by: INTERNAL MEDICINE

## 2018-06-20 PROCEDURE — 76705 ECHO EXAM OF ABDOMEN: CPT | Performed by: SURGERY

## 2018-06-20 PROCEDURE — 85025 COMPLETE CBC W/AUTO DIFF WBC: CPT | Performed by: HOSPITALIST

## 2018-06-20 PROCEDURE — 80076 HEPATIC FUNCTION PANEL: CPT | Performed by: HOSPITALIST

## 2018-06-20 PROCEDURE — A4216 STERILE WATER/SALINE, 10 ML: HCPCS

## 2018-06-20 PROCEDURE — A4216 STERILE WATER/SALINE, 10 ML: HCPCS | Performed by: HOSPITALIST

## 2018-06-20 PROCEDURE — 81001 URINALYSIS AUTO W/SCOPE: CPT | Performed by: INTERNAL MEDICINE

## 2018-06-20 PROCEDURE — 76770 US EXAM ABDO BACK WALL COMP: CPT | Performed by: INTERNAL MEDICINE

## 2018-06-20 PROCEDURE — 83735 ASSAY OF MAGNESIUM: CPT | Performed by: HOSPITALIST

## 2018-06-20 PROCEDURE — 87205 SMEAR GRAM STAIN: CPT | Performed by: INTERNAL MEDICINE

## 2018-06-20 PROCEDURE — 80048 BASIC METABOLIC PNL TOTAL CA: CPT | Performed by: HOSPITALIST

## 2018-06-20 PROCEDURE — 83735 ASSAY OF MAGNESIUM: CPT | Performed by: INTERNAL MEDICINE

## 2018-06-20 PROCEDURE — 5A09357 ASSISTANCE WITH RESPIRATORY VENTILATION, LESS THAN 24 CONSECUTIVE HOURS, CONTINUOUS POSITIVE AIRWAY PRESSURE: ICD-10-PCS | Performed by: HOSPITALIST

## 2018-06-20 PROCEDURE — C9113 INJ PANTOPRAZOLE SODIUM, VIA: HCPCS | Performed by: HOSPITALIST

## 2018-06-20 PROCEDURE — 94660 CPAP INITIATION&MGMT: CPT

## 2018-06-20 RX ORDER — HYDROMORPHONE HYDROCHLORIDE 1 MG/ML
0.2 INJECTION, SOLUTION INTRAMUSCULAR; INTRAVENOUS; SUBCUTANEOUS EVERY 2 HOUR PRN
Status: DISCONTINUED | OUTPATIENT
Start: 2018-06-20 | End: 2018-06-22

## 2018-06-20 RX ORDER — 0.9 % SODIUM CHLORIDE 0.9 %
VIAL (ML) INJECTION
Status: DISPENSED
Start: 2018-06-20 | End: 2018-06-21

## 2018-06-20 RX ORDER — POTASSIUM CHLORIDE 14.9 MG/ML
20 INJECTION INTRAVENOUS ONCE
Status: DISCONTINUED | OUTPATIENT
Start: 2018-06-20 | End: 2018-06-22

## 2018-06-20 RX ORDER — HALOPERIDOL 5 MG/ML
1 INJECTION INTRAMUSCULAR ONCE
Status: COMPLETED | OUTPATIENT
Start: 2018-06-20 | End: 2018-06-20

## 2018-06-20 RX ORDER — HALOPERIDOL 5 MG/ML
INJECTION INTRAMUSCULAR
Status: COMPLETED
Start: 2018-06-20 | End: 2018-06-20

## 2018-06-20 RX ORDER — SODIUM CHLORIDE 9 MG/ML
INJECTION, SOLUTION INTRAVENOUS
Status: DISPENSED
Start: 2018-06-20 | End: 2018-06-21

## 2018-06-20 NOTE — CONSULTS
Critical Care Consult     Assessment / Plan:  1. Gallstone pancreatitis -  S/p ERCP  - Zosyn  - ADAT  - pain control  - GI and surgery following  2. Acute hypoxic respiratory failure - likely has component of mild pulm edema.  She may have aspirated as well Unknown time   multiple vitamin Oral Chew Tab Chew 1 tablet by mouth daily.  Disp:  Rfl:  6/16/2018 at Unknown time   Pantoprazole Sodium 40 MG Oral Tab EC Take 40 mg by mouth every morning before breakfast. Disp:  Rfl:  6/16/2018 at Unknown time   gladys every morning before breakfast. Disp:  Rfl:    hydrochlorothiazide 25 MG Oral Tab Take 25 mg by mouth daily. Disp:  Rfl:    Irbesartan 300 MG Oral Tab Take 300 mg by mouth nightly.  Disp:  Rfl:    Amitriptyline HCl 10 MG Oral Tab Take 10 mg by mouth nightly answers questions appropriately    Labs:  Reviewed in EMR    Inpatient Medications:  Reviewed in EMR    Imaging:   Chest imaging reviewed

## 2018-06-20 NOTE — PLAN OF CARE
GASTROINTESTINAL - ADULT    • Maintains or returns to baseline bowel function Not Progressing    US ordered for this am of kidneys and gall bladder    Patient/Family Goals    • Patient/Family Long Term Goal Not Progressing    • Patient/Family Short Term Go

## 2018-06-20 NOTE — CONSULTS
[facility]    Report of Consultation    Trina Lees Patient Status:  Inpatient    1925 MRN [id number]   Location [facility] 2W/SW Attending Sheila Carrasquillo MD   [facility] Day # 3 PCP REMI MCCOLLUM DO, DO     Date of Admission:   0.9 % injection 3 mL 3 mL Intravenous PRN   ondansetron HCl (ZOFRAN) injection 4 mg 4 mg Intravenous Q6H PRN   0.9%  NaCl infusion  Intravenous Continuous   Piperacillin Sod-Tazobactam So (ZOSYN) 3.375 g in dextrose 5 % 100 mL ADD-vantage 3.375 g Intraveno %.    Intake/Output Summary (Last 24 hours) at 06/20/18 0748  Last data filed at 06/20/18 0550   Gross per 24 hour   Intake             3703 ml   Output              450 ml   Net             3253 ml     Wt Readings from Last 1 Encounters:  06/20/18 : 173 l 4. Mild acute interstitial edematous pancreatitis without acute peripancreatic fluid collection. 5. Marked gallbladder distention is noted. 6. Interval development of small bilateral pleural effusions.   7. Atherosclerosis with infrarenal abdominal aortic

## 2018-06-20 NOTE — PROGRESS NOTES
[facility]    Progress Note    Mau Gilmore Patient Status:  Inpatient    1925 MRN [id number]   Location [facility] 5SW/SE Attending Eugenia Martinez MD   Hosp Day # 2 PCP REMI MCCOLLUM, DO, DO     Subjective:     Daughter at  bed Tartrate  5 mg Intravenous Q6H   • diltiazem (CARDIZEM) bolus from bag  5 mg Intravenous Once   • piperacillin-tazobactam  3.375 g Intravenous Q12H   • pantoprazole (PROTONIX) IV push  40 mg Intravenous Daily       Results:       Recent Labs   Lab  06/17/1 peripancreatic fluid collection. 5. Marked gallbladder distention is noted. 6. Interval development of small bilateral pleural effusions. 7. Atherosclerosis with infrarenal abdominal aortic ectasia.   8. Dilatation of the main pulmonary artery trunk may

## 2018-06-20 NOTE — PHYSICAL THERAPY NOTE
Attempted to see patient for physical therapy session. Patient is confused and on restraints at this time, per RN is not appropriate for physical therapy today. Will attempt to see patient tomorrow for physical therapy session as time permits. RN aware.

## 2018-06-20 NOTE — PROGRESS NOTES
[facility] Hospitalist Progress Note     CC: Hospital Follow up    PCP: REMI MCCOLLUM DO,        Assessment/Plan:     Principal Problem:    Acute pancreatitis, unspecified complication status, unspecified pancreatitis type  Active Problems:    Acute pancreatitis rapid rates  - placed in PCU, started on cardizem, changed to IV metop per cards  - wean off, cards consulted, echo with normal EF 60-65%, DD, and elevated PA pressures of 85  - transition to po when able  - appears in NSR this am     Question of intrathor soft tender to palpation, no rebound  MSK: Full range of motion in extremities   Skin: no rashes or lesions  Neuro: agitated motor intact, no sensory deficits  Psyc: agitated and confused      Data Review:       Labs:     Recent Labs   Lab  06/18/18   1412 and pericholecystic edema as well as layering dependent sludge within the gallbladder. 3. Acute interstitial edematous pancreatitis involving the pancreatic head and body. No well-defined/drainable peripancreatic fluid collection.  Please note that assessme 12. Lesser incidental findings as above. A preliminary report was issued by the [address] [place] [address] Radiology teleradiology service. There are no major discrepancies.    Dictated by (CST): Jennifer Perrin MD on 6/19/2018 at 8:38     Approved by (CST): Jennifer Perrin

## 2018-06-20 NOTE — PROGRESS NOTES
[facility] Consult Note:  Medication List Evaluation for Delirium    Pa Kevin has tested positive for delirium per RN evaluation.   Pharmacy has been consulted to evaluate his current medications for those that could be contributing to the presence

## 2018-06-20 NOTE — PROGRESS NOTES
[facility]  Gastroenterology Progress Note    Mark Steel Patient Status:  Inpatient    1925 MRN [id number]   Location [facility] 2W/SW Attending Shadi Quintero MD   Hosp Day # 3 PCP REMI MCCOLLUM DO, DO     Subjective:   [facility] into the hiatal hernia. 3. Interval placement of a pancreatic duct stent. 4. Mild acute interstitial edematous pancreatitis without acute peripancreatic fluid collection. 5. Marked gallbladder distention is noted.   6. Interval development of small bilat were more indicative of cholecystitis, surgery is following appreciate recs  -LFT's with slight elevation today. Discussed with Dr Anne-Marie Grey, will trend LFT's for now, less likely bile duct obstruction s/p sphincterotomy.  Monitor today  -x-ray to follow up p

## 2018-06-21 ENCOUNTER — APPOINTMENT (OUTPATIENT)
Dept: GENERAL RADIOLOGY | Facility: HOSPITAL | Age: 83
DRG: 438 | End: 2018-06-21
Attending: INTERNAL MEDICINE
Payer: MEDICARE

## 2018-06-21 LAB
ALBUMIN SERPL BCP-MCNC: 2.1 G/DL (ref 3.5–4.8)
ALBUMIN/GLOB SERPL: 0.6 {RATIO} (ref 1–2)
ALP SERPL-CCNC: 121 U/L (ref 32–100)
ALT SERPL-CCNC: 116 U/L (ref 14–54)
ANION GAP SERPL CALC-SCNC: 7 MMOL/L (ref 0–18)
AST SERPL-CCNC: 158 U/L (ref 15–41)
BASOPHILS # BLD: 0 K/UL (ref 0–0.2)
BASOPHILS NFR BLD: 0 %
BILIRUB SERPL-MCNC: 4.3 MG/DL (ref 0.3–1.2)
BUN SERPL-MCNC: 40 MG/DL (ref 8–20)
BUN/CREAT SERPL: 18.9 (ref 10–20)
CALCIUM SERPL-MCNC: 8.5 MG/DL (ref 8.5–10.5)
CHLORIDE SERPL-SCNC: 115 MMOL/L (ref 95–110)
CO2 SERPL-SCNC: 20 MMOL/L (ref 22–32)
CREAT SERPL-MCNC: 2.12 MG/DL (ref 0.5–1.5)
EOSINOPHIL # BLD: 0.1 K/UL (ref 0–0.7)
EOSINOPHIL NFR BLD: 1 %
ERYTHROCYTE [DISTWIDTH] IN BLOOD BY AUTOMATED COUNT: 15.4 % (ref 11–15)
GLOBULIN PLAS-MCNC: 3.3 G/DL (ref 2.5–3.7)
GLUCOSE SERPL-MCNC: 143 MG/DL (ref 70–99)
HCT VFR BLD AUTO: 32.9 % (ref 35–48)
HGB BLD-MCNC: 10.9 G/DL (ref 12–16)
LYMPHOCYTES # BLD: 0.7 K/UL (ref 1–4)
LYMPHOCYTES NFR BLD: 11 %
MAGNESIUM SERPL-MCNC: 1.8 MG/DL (ref 1.8–2.5)
MCH RBC QN AUTO: 30.5 PG (ref 27–32)
MCHC RBC AUTO-ENTMCNC: 33 G/DL (ref 32–37)
MCV RBC AUTO: 92.4 FL (ref 80–100)
METAMYELOCYTES # BLD MANUAL: 0.07 K/UL
MONOCYTES # BLD: 0.4 K/UL (ref 0–1)
MONOCYTES NFR BLD: 6 %
MYELOCYTES NFR BLD: 1 %
NEUTROPHILS # BLD AUTO: 5.3 K/UL (ref 1.8–7.7)
NEUTROPHILS NFR BLD: 77 %
NEUTS BAND NFR BLD: 4 %
NRBC BLD-RTO: 1 % (ref ?–1)
OSMOLALITY UR CALC.SUM OF ELEC: 306 MOSM/KG (ref 275–295)
PHOSPHATE SERPL-MCNC: 2.9 MG/DL (ref 2.4–4.7)
PLATELET # BLD AUTO: 140 K/UL (ref 140–400)
PMV BLD AUTO: 9.3 FL (ref 7.4–10.3)
POTASSIUM SERPL-SCNC: 2.9 MMOL/L (ref 3.3–5.1)
PROT SERPL-MCNC: 5.4 G/DL (ref 5.9–8.4)
RBC # BLD AUTO: 3.56 M/UL (ref 3.7–5.4)
SODIUM SERPL-SCNC: 142 MMOL/L (ref 136–144)
WBC # BLD AUTO: 6.5 K/UL (ref 4–11)

## 2018-06-21 PROCEDURE — 94660 CPAP INITIATION&MGMT: CPT

## 2018-06-21 PROCEDURE — 71045 X-RAY EXAM CHEST 1 VIEW: CPT | Performed by: INTERNAL MEDICINE

## 2018-06-21 PROCEDURE — A4216 STERILE WATER/SALINE, 10 ML: HCPCS | Performed by: HOSPITALIST

## 2018-06-21 PROCEDURE — 83735 ASSAY OF MAGNESIUM: CPT | Performed by: HOSPITALIST

## 2018-06-21 PROCEDURE — 80053 COMPREHEN METABOLIC PANEL: CPT | Performed by: HOSPITALIST

## 2018-06-21 PROCEDURE — 36569 INSJ PICC 5 YR+ W/O IMAGING: CPT

## 2018-06-21 PROCEDURE — C9113 INJ PANTOPRAZOLE SODIUM, VIA: HCPCS | Performed by: HOSPITALIST

## 2018-06-21 PROCEDURE — 92610 EVALUATE SWALLOWING FUNCTION: CPT

## 2018-06-21 PROCEDURE — 76937 US GUIDE VASCULAR ACCESS: CPT

## 2018-06-21 PROCEDURE — 85027 COMPLETE CBC AUTOMATED: CPT | Performed by: HOSPITALIST

## 2018-06-21 PROCEDURE — 85007 BL SMEAR W/DIFF WBC COUNT: CPT | Performed by: HOSPITALIST

## 2018-06-21 PROCEDURE — A4216 STERILE WATER/SALINE, 10 ML: HCPCS | Performed by: INTERNAL MEDICINE

## 2018-06-21 PROCEDURE — A4216 STERILE WATER/SALINE, 10 ML: HCPCS

## 2018-06-21 PROCEDURE — 85025 COMPLETE CBC W/AUTO DIFF WBC: CPT | Performed by: HOSPITALIST

## 2018-06-21 PROCEDURE — 84100 ASSAY OF PHOSPHORUS: CPT | Performed by: INTERNAL MEDICINE

## 2018-06-21 RX ORDER — SODIUM CHLORIDE 9 MG/ML
INJECTION, SOLUTION INTRAVENOUS
Status: COMPLETED
Start: 2018-06-21 | End: 2018-06-21

## 2018-06-21 RX ORDER — SODIUM CHLORIDE 0.9 % (FLUSH) 0.9 %
10 SYRINGE (ML) INJECTION AS NEEDED
Status: DISCONTINUED | OUTPATIENT
Start: 2018-06-21 | End: 2018-07-10

## 2018-06-21 RX ORDER — LIDOCAINE HYDROCHLORIDE 10 MG/ML
0.5 INJECTION, SOLUTION INFILTRATION; PERINEURAL ONCE AS NEEDED
Status: ACTIVE | OUTPATIENT
Start: 2018-06-21 | End: 2018-06-21

## 2018-06-21 RX ORDER — MAGNESIUM OXIDE 400 MG (241.3 MG MAGNESIUM) TABLET
400 TABLET ONCE
Status: DISCONTINUED | OUTPATIENT
Start: 2018-06-21 | End: 2018-06-21

## 2018-06-21 RX ORDER — ARIPIPRAZOLE 15 MG/1
40 TABLET ORAL EVERY 4 HOURS
Status: DISCONTINUED | OUTPATIENT
Start: 2018-06-21 | End: 2018-06-21

## 2018-06-21 RX ORDER — HEPARIN SODIUM 5000 [USP'U]/ML
5000 INJECTION, SOLUTION INTRAVENOUS; SUBCUTANEOUS EVERY 8 HOURS
Status: DISCONTINUED | OUTPATIENT
Start: 2018-06-21 | End: 2018-07-03

## 2018-06-21 RX ORDER — 0.9 % SODIUM CHLORIDE 0.9 %
VIAL (ML) INJECTION
Status: COMPLETED
Start: 2018-06-21 | End: 2018-06-21

## 2018-06-21 RX ORDER — FUROSEMIDE 10 MG/ML
20 INJECTION INTRAMUSCULAR; INTRAVENOUS ONCE
Status: COMPLETED | OUTPATIENT
Start: 2018-06-21 | End: 2018-06-21

## 2018-06-21 RX ORDER — MAGNESIUM SULFATE HEPTAHYDRATE 40 MG/ML
2 INJECTION, SOLUTION INTRAVENOUS ONCE
Status: COMPLETED | OUTPATIENT
Start: 2018-06-21 | End: 2018-06-21

## 2018-06-21 NOTE — PROGRESS NOTES
[facility] Hospitalist Progress Note     CC: Hospital Follow up    PCP: REMI MCCOLLUM DO,        Assessment/Plan:     Principal Problem:    Acute pancreatitis, unspecified complication status, unspecified pancreatitis type  Active Problems:    Acute pancreatitis PCU, started on cardizem, changed to IV metop per cards  - wean off, cards consulted, echo with normal EF 60-65%, DD, and elevated PA pressures of 85  - transition to po when able  - appears in NSR this am     Question of intrathoracic volvulus involving s extremities   Skin: no rashes or lesions  Neuro: motor intact, no sensory deficits  Psyc: calm, approcpiate      Data Review:       Labs:     Recent Labs   Lab  06/19/18   0404  06/20/18   0411  06/21/18   0509   RBC  3.52*  3.63*  3.56*   HGB  10.8*  11.1 interstitial edematous pancreatitis without acute peripancreatic fluid collection. 5. Marked gallbladder distention is noted. 6. Interval development of small bilateral pleural effusions. 7. Atherosclerosis with infrarenal abdominal aortic ectasia.   8. diltiazem (CARDIZEM) bolus from bag  5 mg Intravenous Once   • piperacillin-tazobactam  3.375 g Intravenous Q12H   • pantoprazole (PROTONIX) IV push  40 mg Intravenous Daily     • diltiazem Stopped (06/19/18 1100)   • sodium chloride 43 mL/hr at 06/21/18 1

## 2018-06-21 NOTE — PROGRESS NOTES
Vascular Access Note  Inserted by Alvino Oswald RN    Vascular Access Screening:   Allergies to Lidocaine: no  Allergies to Latex: no  Presence of Pacemaker/Defibrillator: No  Mastectomy with Lymph Node Dissection: No  AV Fistula / AV Graft: No  Dialysis Ca

## 2018-06-21 NOTE — PROGRESS NOTES
[facility]  Gastroenterology Progress Note    Contreras Bolden Patient Status:  Inpatient    1925 MRN [id number]   Location [facility] 2W/SW Attending Criselda Hartman MD   [facility] Day # 4 PCP REMI MCCOLLUM DO, DO     Subjective:   Evelyn Lopez [facility] that was admitted for abdominal pain. Noted to have pancreatitis and choledocolithiasis. ERCP 6/18/19 with choledocolithiasis and hemobilia s/p sweep and sphincterotomy. Cholecystitis likely present as well based on MRI and CT scan.  Post procedure CT scan

## 2018-06-21 NOTE — PHYSICAL THERAPY NOTE
Attempted to see patient for physical therapy session. Per RN, patient on continuous bipap at this time. Will attempt to see patient tomorrow for physical therapy session as time permits. RN aware.

## 2018-06-21 NOTE — PROGRESS NOTES
06/21/18 1200   Clinical Encounter Type   Visited With Family   Routine Visit Introduction   Continue Visiting Yes   Crisis Visit Critical care   Referral From Nurse;    Referral To    [facility] Encounters   [facility] Needs Prayer   Luca

## 2018-06-21 NOTE — PROGRESS NOTES
Mary Shea Patient Status:  Inpatient    1925 MRN [id number]   Location [facility] 2W/SW Attending Rabia Kuo MD   Hosp Day # 4 PCP REMI WILL, DO, DO     Critical Care Progress Note      Assessment/Plan:  1.  Gallstone pancreatitis - Resp 25   Ht 5' 2\" (1.575 m)   Wt 173 lb 15.1 oz (78.9 kg)   LMP  (LMP Unknown)   SpO2 95%   BMI 31.81 kg/m²   Physical Exam:   General: alert cooperative, confused   HEENT: lips, mucosa, tongue normal. Mallampati II   Lungs: Clear to auscultation bilate

## 2018-06-21 NOTE — PROGRESS NOTES
[facility]    Progress Note    Yousif Sood Patient Status:  Inpatient    1925 MRN [id number]   Location [facility] 5SW/SE Attending Deo Navas MD   Hosp Day # 4 PCP REMI MCCOLLUM, DO, DO     Subjective:     Patient has is i much change. However patient has been having some chronic mild obstructive symptoms. This seems most likely cause for the aspiration. Patient's been worked up and treated for possible aspiration.   Patient most likely will need to proceed with hiatal her distended with gas causing further encroachment into the chest and further atelectasis. * Heart is obscured. * Consolidation/atelectasis reidentified at the lung bases as well as left upper lobe. * Markings are diffusely prominent.  * Overall unfavorable ch

## 2018-06-21 NOTE — PLAN OF CARE
Problem: RESPIRATORY - ADULT  Goal: Achieves optimal ventilation and oxygenation  INTERVENTIONS:  - Assess for changes in respiratory status  - Assess for changes in mentation and behavior  - Position to facilitate oxygenation and minimize respiratory effo and sodium. Initiate appropriate interventions as ordered  Outcome: Progressing  Pt confused and forgetful. Requiring frequent reorientation. Oriented to person and time (doesn't know the year or month). Disoriented to situation and place.  Encouraging tech

## 2018-06-21 NOTE — PROGRESS NOTES
[facility] - [facility]    Progress Note    Jonel Ramirez Patient Status:  Inpatient    1925 MRN [id number]   Location [facility] 2W/SW Attending Dany Sood MD   [facility] Day # 4 PCP REMI MCCOLLUM DO, DO       Subjective:    Julianne Út 14. 20*   CA  7.3*  7.9*   --   8.5   NA  139  140   --   142   K  3.3  2.7*  3.0*  2.9*   CL  108  110   --   115*   CO2  19*  19*   --   20*          Xr Chest Ap Portable  (cpt=71045)    Result Date: 6/19/2018  CONCLUSION:  1.  Small bibasilar pleural effusio

## 2018-06-21 NOTE — PROGRESS NOTES
ASSESSMENT/PLAN:    Impression: 1. Atrial fibrillation with rapid ventricular response is now converted to sinus rhythm. Holding sinus on iv lopressor  Echo reviewed  2. Acute gallstone pancreatitis with volvulus. lft's better  3.   History of coronary and moist. NECK:jugular venous pressure not elevated. RESP:normal rate and rhythm, clear to auscultation. GI: Few bowel sounds, round, mild diffuse tenderness. No masses rectal deferred. MS:inadequate gait for exercise testing.  EXT:no clubbing or cyanosis

## 2018-06-21 NOTE — SLP NOTE
ADULT SWALLOWING EVALUATION    ASSESSMENT    ASSESSMENT/OVERALL IMPRESSION:  Pt seen sitting upright in chair for all PO trials. Pt verbalized her desire to take \"small sips\" via open cup of thin and nectar thick liquids.  Pt self fed sips of liquids at s Problems:    Acute pancreatitis    Pancreatitis      Past Medical History  Past Medical History:   Diagnosis Date   • Hiatal hernia    • High blood pressure    • Hyperlipidemia        Prior Living Situation: Unknown  Diet Prior to Admission: Regular; Thin l precautions and swallow strategies independently over 3 session(s).     In Progress   Goal #3 The patient will tolerate trial upgrade of hard solid consistency and thin liquids without overt signs or symptoms of aspiration with 100 % accuracy over 2 session

## 2018-06-22 LAB
ALBUMIN SERPL BCP-MCNC: 1.9 G/DL (ref 3.5–4.8)
ALBUMIN SERPL BCP-MCNC: 1.9 G/DL (ref 3.5–4.8)
ALP SERPL-CCNC: 131 U/L (ref 32–100)
ALT SERPL-CCNC: 124 U/L (ref 14–54)
ANION GAP SERPL CALC-SCNC: 5 MMOL/L (ref 0–18)
AST SERPL-CCNC: 147 U/L (ref 15–41)
BASOPHILS # BLD: 0 K/UL (ref 0–0.2)
BASOPHILS NFR BLD: 0 %
BILIRUB DIRECT SERPL-MCNC: 1.9 MG/DL (ref 0–0.2)
BILIRUB SERPL-MCNC: 3.4 MG/DL (ref 0.3–1.2)
BUN SERPL-MCNC: 37 MG/DL (ref 8–20)
BUN/CREAT SERPL: 19.1 (ref 10–20)
CALCIUM SERPL-MCNC: 8.4 MG/DL (ref 8.5–10.5)
CHLORIDE SERPL-SCNC: 113 MMOL/L (ref 95–110)
CO2 SERPL-SCNC: 24 MMOL/L (ref 22–32)
CREAT SERPL-MCNC: 1.94 MG/DL (ref 0.5–1.5)
EOSINOPHIL # BLD: 0.3 K/UL (ref 0–0.7)
EOSINOPHIL NFR BLD: 6 %
ERYTHROCYTE [DISTWIDTH] IN BLOOD BY AUTOMATED COUNT: 15.7 % (ref 11–15)
GLUCOSE BLDC GLUCOMTR-MCNC: 114 MG/DL (ref 70–99)
GLUCOSE SERPL-MCNC: 122 MG/DL (ref 70–99)
HCT VFR BLD AUTO: 30.6 % (ref 35–48)
HGB BLD-MCNC: 10.3 G/DL (ref 12–16)
LYMPHOCYTES # BLD: 0.5 K/UL (ref 1–4)
LYMPHOCYTES NFR BLD: 10 %
MAGNESIUM SERPL-MCNC: 2 MG/DL (ref 1.8–2.5)
MCH RBC QN AUTO: 30.6 PG (ref 27–32)
MCHC RBC AUTO-ENTMCNC: 33.5 G/DL (ref 32–37)
MCV RBC AUTO: 91.2 FL (ref 80–100)
METAMYELOCYTES # BLD MANUAL: 0.04 K/UL
MONOCYTES # BLD: 0.5 K/UL (ref 0–1)
MONOCYTES NFR BLD: 11 %
MYELOCYTES NFR BLD: 1 %
NEUTROPHILS # BLD AUTO: 3.3 K/UL (ref 1.8–7.7)
NEUTROPHILS NFR BLD: 72 %
OSMOLALITY UR CALC.SUM OF ELEC: 304 MOSM/KG (ref 275–295)
PHOSPHATE SERPL-MCNC: 2.4 MG/DL (ref 2.4–4.7)
PLATELET # BLD AUTO: 134 K/UL (ref 140–400)
PMV BLD AUTO: 9.2 FL (ref 7.4–10.3)
POTASSIUM SERPL-SCNC: 2.8 MMOL/L (ref 3.3–5.1)
POTASSIUM SERPL-SCNC: 2.9 MMOL/L (ref 3.3–5.1)
POTASSIUM SERPL-SCNC: 2.9 MMOL/L (ref 3.3–5.1)
PROT SERPL-MCNC: 5.3 G/DL (ref 5.9–8.4)
RBC # BLD AUTO: 3.35 M/UL (ref 3.7–5.4)
SODIUM SERPL-SCNC: 142 MMOL/L (ref 136–144)
WBC # BLD AUTO: 4.5 K/UL (ref 4–11)

## 2018-06-22 PROCEDURE — A4216 STERILE WATER/SALINE, 10 ML: HCPCS | Performed by: HOSPITALIST

## 2018-06-22 PROCEDURE — A4216 STERILE WATER/SALINE, 10 ML: HCPCS | Performed by: INTERNAL MEDICINE

## 2018-06-22 PROCEDURE — 83735 ASSAY OF MAGNESIUM: CPT | Performed by: HOSPITALIST

## 2018-06-22 PROCEDURE — 85007 BL SMEAR W/DIFF WBC COUNT: CPT | Performed by: INTERNAL MEDICINE

## 2018-06-22 PROCEDURE — 85027 COMPLETE CBC AUTOMATED: CPT | Performed by: INTERNAL MEDICINE

## 2018-06-22 PROCEDURE — 80076 HEPATIC FUNCTION PANEL: CPT | Performed by: HOSPITALIST

## 2018-06-22 PROCEDURE — 80069 RENAL FUNCTION PANEL: CPT | Performed by: INTERNAL MEDICINE

## 2018-06-22 PROCEDURE — 84132 ASSAY OF SERUM POTASSIUM: CPT | Performed by: HOSPITALIST

## 2018-06-22 PROCEDURE — C9113 INJ PANTOPRAZOLE SODIUM, VIA: HCPCS | Performed by: HOSPITALIST

## 2018-06-22 PROCEDURE — 92526 ORAL FUNCTION THERAPY: CPT

## 2018-06-22 PROCEDURE — 84132 ASSAY OF SERUM POTASSIUM: CPT | Performed by: INTERNAL MEDICINE

## 2018-06-22 PROCEDURE — 82962 GLUCOSE BLOOD TEST: CPT

## 2018-06-22 PROCEDURE — 85025 COMPLETE CBC W/AUTO DIFF WBC: CPT | Performed by: INTERNAL MEDICINE

## 2018-06-22 PROCEDURE — 94660 CPAP INITIATION&MGMT: CPT

## 2018-06-22 RX ORDER — HYDROMORPHONE HYDROCHLORIDE 1 MG/ML
0.2 INJECTION, SOLUTION INTRAMUSCULAR; INTRAVENOUS; SUBCUTANEOUS EVERY 2 HOUR PRN
Status: DISCONTINUED | OUTPATIENT
Start: 2018-06-22 | End: 2018-06-30

## 2018-06-22 RX ORDER — POTASSIUM CHLORIDE 14.9 MG/ML
20 INJECTION INTRAVENOUS ONCE
Status: DISCONTINUED | OUTPATIENT
Start: 2018-06-22 | End: 2018-06-22

## 2018-06-22 RX ORDER — ACETAMINOPHEN 325 MG/1
650 TABLET ORAL EVERY 6 HOURS PRN
Status: DISCONTINUED | OUTPATIENT
Start: 2018-06-22 | End: 2018-07-10

## 2018-06-22 RX ORDER — FUROSEMIDE 10 MG/ML
20 INJECTION INTRAMUSCULAR; INTRAVENOUS ONCE
Status: COMPLETED | OUTPATIENT
Start: 2018-06-22 | End: 2018-06-22

## 2018-06-22 RX ORDER — POTASSIUM CHLORIDE 29.8 MG/ML
40 INJECTION INTRAVENOUS ONCE
Status: COMPLETED | OUTPATIENT
Start: 2018-06-22 | End: 2018-06-22

## 2018-06-22 NOTE — DIETARY NOTE
NUTRITION NOTE UPDATE:    Order received to begin TPN, pt potential refeed risk with decline in PO intake x 5 days PTA and NPO status x5 days.  TPN bag #1 will provide: 1500 ml, 65 gm Pro, 800 NPC (500 Dextrose,300 IVFE) and 1060 total Kcal. TPN bag #1 meet

## 2018-06-22 NOTE — PROGRESS NOTES
ASSESSMENT/PLAN:    Impression: 1. Atrial fibrillation with rapid ventricular response is now converted to sinus rhythm. Holding sinus on iv lopressor  Echo reviewed; no recurrence ovenight  2.   Acute gallstone pancreatitis with volvulus. lft's better HEAD/FACE:no trauma, normocephalic. EYES:conjunctiva not injected, no xanthelasma. ENT:mucosa pink and moist. NECK:jugular venous pressure not elevated. RESP:normal rate and rhythm, clear to auscultation.  GI: Few bowel sounds, round, mild diffuse tendernes

## 2018-06-22 NOTE — PROGRESS NOTES
[facility]    Progress Note    Xena Escobedo Patient Status:  Inpatient    1925 MRN [id number]   Location [facility] 3W/SW Attending Adrien Wheeler MD   [facility] Day # 5 PCP REMI MCCOLLUM DO, DO       Subjective:    Julianne Út 14. --   20*  22*   CA  7.9*   --   8.5  8.4*   NA  140   --   142  142   K  2.7*  3.0*  2.9*  2.9*  2.9*  2.8*   CL  110   --   115*  113*   CO2  19*   --   20*  24          Xr Chest Ap Portable  (cpt=71045)    Result Date: 6/21/2018  CONCLUSION:   * Large

## 2018-06-22 NOTE — OCCUPATIONAL THERAPY NOTE
RN contacted regarding pt participation in therapy. Pt approached at bedside, oxygen off and sats at 83%. Oxygen reapplied and sats improved to 88%. Pt reporting that she had been in the chair for 4 hours and just returned to bed.  Pt declining all attempts

## 2018-06-22 NOTE — PHYSICAL THERAPY NOTE
Attempted PT treatment session in coordination with OT. Pt states she is too tired, sat up in the chair and returned to bed recently. Pt 02 saturation 87 to 88%, rn informed and in room.

## 2018-06-22 NOTE — PROGRESS NOTES
Pulmonary Progress Note     Assessment / Plan:  1. Gallstone pancreatitis -  S/p ERCP  - Zosyn  - pain control  - tentative plan for cholecystectomy early next week  - GI and surgery following  2.  Acute hypoxic respiratory failure - due to aspiration and m

## 2018-06-22 NOTE — SLP NOTE
SPEECH DAILY NOTE - INPATIENT Re-evaluation of liquids     ASSESSMENT & PLAN   ASSESSMENT  SLP re-evaluation orders received and acknowledged. Per telephone conversation with Dr. Ad Musa, pt requesting thin liquids. MD and SLP reviewed CXR and possible VFSS. #1 The patient will tolerate mechanical soft consistency and nectar liquids without overt signs or symptoms of aspiration with 100 % accuracy over 3 session(s). Pt currently on a CL diet per MD orders d/t hiatal hernia.  Pt tolerates nectar thickened liqui

## 2018-06-22 NOTE — PROGRESS NOTES
[facility]  Gastroenterology Progress Note    Bonny Ortez Patient Status:  Inpatient    1925 MRN [id number]   Location [facility] 3W/SW Attending Ben Castillo MD   Hosp Day # 5 PCP REMI MCCOLLUM DO, DO     Subjective:   Shira Symone [facility] pancreatitis type     Pancreatitis      Assessment/Plan:  Ms Rico Diaz is a 81 y/o female that was admitted for abdominal pain. Noted to have pancreatitis and choledocolithiasis. ERCP 6/18/19 with choledocolithiasis and hemobilia s/p sweep and sphincterotomy. 7

## 2018-06-22 NOTE — PLAN OF CARE
GASTROINTESTINAL - ADULT    • Maintains or returns to baseline bowel function Not Progressing        Patient/Family Goals    • Patient/Family Long Term Goal Not Progressing    • Patient/Family Short Term Goal Not Progressing          GASTROINTESTINAL - SYLWIA

## 2018-06-22 NOTE — PROGRESS NOTES
[facility] Hospitalist Progress Note     CC: Hospital Follow up    PCP: REMI MCCOLLUM DO,        Assessment/Plan:     Principal Problem:    Acute pancreatitis, unspecified complication status, unspecified pancreatitis type  Active Problems:    Acute pancreatitis hernia (chornic) on 6/19  - weaned off bipap on 6/21 now on 5L NC doing well  - stop IVF, nebs prn, aggressive IS  - Pulm consulted  - poss aspiration event on 6/21, on zosyn, speech re-eval  - repeat CXR in am      YOUNG   -cr 1.9 on admit, repeat 1.7-> 2.4 %.    Temp:  [97.2 °F (36.2 °C)-98.2 °F (36.8 °C)] 97.8 °F (36.6 °C)  Pulse:  [] 88  Resp:  [18-26] 20  BP: (101-145)/(58-81) 132/74  FiO2 (%):  [40 %] 40 %      Intake/Output:    Intake/Output Summary (Last 24 hours) at 06/22/18 8553  Last data file 2. 1*  1.9*  1.9*         Imaging:  Xr Chest Ap Portable  (cpt=71045)    Result Date: 6/21/2018  CONCLUSION:   * Large hiatal hernia is reidentified with most of the loops contained in the hernia distended with gas causing further encroachment into the ches

## 2018-06-22 NOTE — PLAN OF CARE
Problem: Patient Centered Care  Goal: Patient preferences are identified and integrated in the patient's plan of care  Interventions:  - What would you like us to know as we care for you?  Lives at home with Son and family  - Provide timely, complete, and a

## 2018-06-23 LAB
ALBUMIN SERPL BCP-MCNC: 1.9 G/DL (ref 3.5–4.8)
ALBUMIN/GLOB SERPL: 0.6 {RATIO} (ref 1–2)
ALP SERPL-CCNC: 136 U/L (ref 32–100)
ALT SERPL-CCNC: 116 U/L (ref 14–54)
ANION GAP SERPL CALC-SCNC: 7 MMOL/L (ref 0–18)
AST SERPL-CCNC: 105 U/L (ref 15–41)
BASOPHILS # BLD: 0 K/UL (ref 0–0.2)
BASOPHILS NFR BLD: 0 %
BILIRUB SERPL-MCNC: 2.4 MG/DL (ref 0.3–1.2)
BUN SERPL-MCNC: 36 MG/DL (ref 8–20)
BUN/CREAT SERPL: 20.3 (ref 10–20)
CALCIUM SERPL-MCNC: 8.3 MG/DL (ref 8.5–10.5)
CHLORIDE SERPL-SCNC: 109 MMOL/L (ref 95–110)
CO2 SERPL-SCNC: 24 MMOL/L (ref 22–32)
CREAT SERPL-MCNC: 1.77 MG/DL (ref 0.5–1.5)
EOSINOPHIL # BLD: 0.1 K/UL (ref 0–0.7)
EOSINOPHIL NFR BLD: 3 %
ERYTHROCYTE [DISTWIDTH] IN BLOOD BY AUTOMATED COUNT: 15.4 % (ref 11–15)
GLOBULIN PLAS-MCNC: 3.4 G/DL (ref 2.5–3.7)
GLUCOSE BLDC GLUCOMTR-MCNC: 154 MG/DL (ref 70–99)
GLUCOSE BLDC GLUCOMTR-MCNC: 192 MG/DL (ref 70–99)
GLUCOSE BLDC GLUCOMTR-MCNC: 202 MG/DL (ref 70–99)
GLUCOSE BLDC GLUCOMTR-MCNC: 202 MG/DL (ref 70–99)
GLUCOSE SERPL-MCNC: 155 MG/DL (ref 70–99)
HCT VFR BLD AUTO: 30.3 % (ref 35–48)
HGB BLD-MCNC: 10.2 G/DL (ref 12–16)
LYMPHOCYTES # BLD: 1 K/UL (ref 1–4)
LYMPHOCYTES NFR BLD: 20 %
MAGNESIUM SERPL-MCNC: 1.8 MG/DL (ref 1.8–2.5)
MCH RBC QN AUTO: 30.8 PG (ref 27–32)
MCHC RBC AUTO-ENTMCNC: 33.7 G/DL (ref 32–37)
MCV RBC AUTO: 91.4 FL (ref 80–100)
MONOCYTES # BLD: 0.5 K/UL (ref 0–1)
MONOCYTES NFR BLD: 10 %
NEUTROPHILS # BLD AUTO: 3 K/UL (ref 1.8–7.7)
NEUTROPHILS NFR BLD: 62 %
NEUTS BAND NFR BLD: 3 %
OSMOLALITY UR CALC.SUM OF ELEC: 301 MOSM/KG (ref 275–295)
PHOSPHATE SERPL-MCNC: 2.6 MG/DL (ref 2.4–4.7)
PLATELET # BLD AUTO: 178 K/UL (ref 140–400)
PMV BLD AUTO: 9 FL (ref 7.4–10.3)
POTASSIUM SERPL-SCNC: 3.4 MMOL/L (ref 3.3–5.1)
PROT SERPL-MCNC: 5.3 G/DL (ref 5.9–8.4)
RBC # BLD AUTO: 3.32 M/UL (ref 3.7–5.4)
SODIUM SERPL-SCNC: 140 MMOL/L (ref 136–144)
VARIANT LYMPHS NFR BLD MANUAL: 2 %
WBC # BLD AUTO: 4.7 K/UL (ref 4–11)

## 2018-06-23 PROCEDURE — 84100 ASSAY OF PHOSPHORUS: CPT | Performed by: HOSPITALIST

## 2018-06-23 PROCEDURE — A4216 STERILE WATER/SALINE, 10 ML: HCPCS | Performed by: INTERNAL MEDICINE

## 2018-06-23 PROCEDURE — C9113 INJ PANTOPRAZOLE SODIUM, VIA: HCPCS | Performed by: HOSPITALIST

## 2018-06-23 PROCEDURE — 94660 CPAP INITIATION&MGMT: CPT

## 2018-06-23 PROCEDURE — 85025 COMPLETE CBC W/AUTO DIFF WBC: CPT | Performed by: HOSPITALIST

## 2018-06-23 PROCEDURE — 80053 COMPREHEN METABOLIC PANEL: CPT | Performed by: HOSPITALIST

## 2018-06-23 PROCEDURE — 83735 ASSAY OF MAGNESIUM: CPT | Performed by: HOSPITALIST

## 2018-06-23 PROCEDURE — 82962 GLUCOSE BLOOD TEST: CPT

## 2018-06-23 PROCEDURE — 85027 COMPLETE CBC AUTOMATED: CPT | Performed by: HOSPITALIST

## 2018-06-23 PROCEDURE — 36592 COLLECT BLOOD FROM PICC: CPT

## 2018-06-23 PROCEDURE — A4216 STERILE WATER/SALINE, 10 ML: HCPCS | Performed by: HOSPITALIST

## 2018-06-23 PROCEDURE — 85007 BL SMEAR W/DIFF WBC COUNT: CPT | Performed by: HOSPITALIST

## 2018-06-23 RX ORDER — POTASSIUM CHLORIDE 14.9 MG/ML
20 INJECTION INTRAVENOUS ONCE
Status: COMPLETED | OUTPATIENT
Start: 2018-06-23 | End: 2018-06-23

## 2018-06-23 RX ORDER — SODIUM CHLORIDE 9 MG/ML
INJECTION, SOLUTION INTRAVENOUS
Status: COMPLETED
Start: 2018-06-23 | End: 2018-06-24

## 2018-06-23 NOTE — PROGRESS NOTES
[facility]  Gastroenterology Progress Note    Yakov Leal Patient Status:  Inpatient    1925 MRN [id number]   Location [facility] 3W/SW Attending Dhaval Walls MD   [facility] Day # 6 PCP REMI MCCOLLUM DO, DO     Subjective:   Darek Orourke [facility] be responsible as well.  Most likely surgery for fozia +/- hernia repair  -c/w abx zosyn  -c/w PPI  -x-ray to follow up pancreatic stent in 4 weeks pending clinical course      Martin Schultz MD  6/23/2018  10:39 AM

## 2018-06-23 NOTE — PLAN OF CARE
Problem: Patient Centered Care  Goal: Patient preferences are identified and integrated in the patient's plan of care  Interventions:  - What would you like us to know as we care for you?  Lives at home with Son and family  - Provide timely, complete, and a vomiting  INTERVENTIONS:  - Maintain adequate hydration with IV or PO as ordered and tolerated  - Nasogastric tube to low intermittent suction as ordered  - Evaluate effectiveness of ordered antiemetic medications  - Provide nonpharmacologic comfort measur assistance with activity based on assessment  - Modify environment to reduce risk of injury  - Provide assistive devices as appropriate  - Consider OT/PT consult to assist with strengthening/mobility  - Encourage toileting schedule   Outcome: Progressing

## 2018-06-23 NOTE — CONSULTS
[facility]  Vascular Surgery Consultation    Violetaalexx Kelly Patient Status:  Inpatient    1925 MRN [id number]   Location [facility] 3W/SW Attending Eden Jaimes MD   [address] Day # 6 PCP REMI MCCOLLUM DO, DO     History of Present Illness:  M use drugs.     Allergies:  No Known Allergies    Medications:    Current Facility-Administered Medications:   •  adult 3 in 1 TPN, , Intravenous, Continuous TPN  •  Insulin Regular Human (NOVOLIN R) 100 UNIT/ML injection 1-5 Units, 1-5 Units, Subcutaneous, The patient was on a BiPAP mask. She deemed very slightly short of breath.   HEENT: ears and throat are clear  NECK: supple, no lymphadenopathy, thyroid wnl  CAROTID: I cannot hear carotid bruits but the patient's respiratory mask made listening to the ne 06/23/2018   ALB 1.9 06/23/2018   ALKPHO 136 06/23/2018   BILT 2.4 06/23/2018   TP 5.3 06/23/2018    06/23/2018    06/23/2018   MG 1.8 06/23/2018   PHOS 2.6 06/23/2018   PGLU 202 06/23/2018       Impression and Plan:  Patient Active Problem L

## 2018-06-23 NOTE — PROGRESS NOTES
[facility]    Progress Note    Natalie Her Patient Status:  Inpatient    1925 MRN [id number]   Location [facility] 5SW/SE Attending Chelsey Lipscomb MD   Hosp Day # 6 PCP REMI MCCOLLUM DO DO     Subjective:     Patient has is i been having some chronic mild obstructive symptoms. This seems possible cause for the aspiration. Patient's been worked up and treated for possible aspiration. I had extensive discussion with pt and daughter today, 6/23.  I pulled up CT scan and showed s 158*  147*  105*   ALT  116*  124*  116*   BILT  4.3*  3.4*  2.4*   TP  5.4*  5.3*  5.3*

## 2018-06-23 NOTE — PROGRESS NOTES
[facility]    Progress Note    Ynes Francisttcher Patient Status:  Inpatient    1925 MRN [id number]   Location [facility] 5SW/SE Attending Dom Durán MD   Hosp Day # 5 PCP REMI MCCOLLUM, , DO     Subjective:     Patient has is i However patient has been having some chronic mild obstructive symptoms. This seems possible cause for the aspiration. Patient's been worked up and treated for possible aspiration.   WIll cont to monitor patient  Liquid diet as tolerated  Will cont to eval further encroachment into the chest and further atelectasis. * Heart is obscured. * Consolidation/atelectasis reidentified at the lung bases as well as left upper lobe. * Markings are diffusely prominent. * Overall unfavorable changes from June 19, 2018.

## 2018-06-23 NOTE — PROGRESS NOTES
[facility]    Progress Note    Ivelisseclare Lantiguaseverino Patient Status:  Inpatient    1925 MRN [id number]   Location [facility] 3W/SW Attending Luciano Onofre MD   [facility] Day # 6 PCP REMI MCCOLLUM DO, DO       Subjective:    Julianne Út 14. 6/21/2018  CONCLUSION:   * Large hiatal hernia is reidentified with most of the loops contained in the hernia distended with gas causing further encroachment into the chest and further atelectasis. * Heart is obscured.  * Consolidation/atelectasis reidentif

## 2018-06-23 NOTE — PROGRESS NOTES
Pulmonary Progress Note     Assessment / Plan:  1. Gallstone pancreatitis - s/p ERCP  - Zosyn  - pain control  - tentative plan for cholecystectomy early next week  - GI and surgery following  2.  Acute hypoxic respiratory failure - due to aspiration and mi

## 2018-06-23 NOTE — PROGRESS NOTES
ASSESSMENT/PLAN:    Impression: 1. Atrial fibrillation with rapid ventricular response is now converted to sinus rhythm. Holding sinus on iv lopressor  Echo reviewed; no recurrence ovenight  2.   Acute gallstone pancreatitis with volvulus. lft's better 30.33 kg/m²   CONS: well developed, well nourished. WEIGHT:discussed. HEAD/FACE:no trauma, normocephalic. EYES:conjunctiva not injected, no xanthelasma. ENT:mucosa pink and moist. NECK:jugular venous pressure not elevated.  RESP:normal rate and rhythm, rob

## 2018-06-23 NOTE — PROGRESS NOTES
[facility] Hospitalist Progress Note     CC: Hospital Follow up    PCP: REMI MCCOLLUM DO,        Assessment/Plan:     Principal Problem:    Acute pancreatitis, unspecified complication status, unspecified pancreatitis type  Active Problems:    Acute pancreatitis aggressive IS  - Pulm consulted  - poss aspiration event on 6/21  - nectar thickened liquids per SLP  - zosyn    Toe discoloration/blue toe syndrome  -noted this am  -non painful, dp pulses intact b/l  -did d/w vascular who was notified    YOUNG   -cr 1.9 on °C), temperature source Axillary, resp. rate 20, height 158.8 cm (5' 2.5\"), weight 168 lb 8 oz (76.4 kg), SpO2 98 %.     Temp:  [97.5 °F (36.4 °C)-98.2 °F (36.8 °C)] 98.2 °F (36.8 °C)  Pulse:  [88-99] 92  Resp:  [20] 20  BP: (150-159)/(84-96) 159/92      I 121*  158*  147*  105*   ALB  2.1*  2.2*  2.1*  1.9*  1.9*  1.9*         Imaging:  Xr Chest Ap Portable  (cpt=71045)    Result Date: 6/21/2018  CONCLUSION:   * Large hiatal hernia is reidentified with most of the loops contained in the hernia distended wit

## 2018-06-24 LAB
ALBUMIN SERPL BCP-MCNC: 2.1 G/DL (ref 3.5–4.8)
ALBUMIN/GLOB SERPL: 0.5 {RATIO} (ref 1–2)
ALP SERPL-CCNC: 151 U/L (ref 32–100)
ALT SERPL-CCNC: 101 U/L (ref 14–54)
ANION GAP SERPL CALC-SCNC: 7 MMOL/L (ref 0–18)
ANION GAP SERPL CALC-SCNC: 7 MMOL/L (ref 0–18)
AST SERPL-CCNC: 76 U/L (ref 15–41)
BASOPHILS # BLD: 0 K/UL (ref 0–0.2)
BASOPHILS NFR BLD: 0 %
BILIRUB SERPL-MCNC: 2.3 MG/DL (ref 0.3–1.2)
BUN SERPL-MCNC: 34 MG/DL (ref 8–20)
BUN SERPL-MCNC: 34 MG/DL (ref 8–20)
BUN/CREAT SERPL: 26.4 (ref 10–20)
BUN/CREAT SERPL: 26.4 (ref 10–20)
CALCIUM SERPL-MCNC: 8.6 MG/DL (ref 8.5–10.5)
CALCIUM SERPL-MCNC: 8.6 MG/DL (ref 8.5–10.5)
CHLORIDE SERPL-SCNC: 107 MMOL/L (ref 95–110)
CHLORIDE SERPL-SCNC: 107 MMOL/L (ref 95–110)
CO2 SERPL-SCNC: 27 MMOL/L (ref 22–32)
CO2 SERPL-SCNC: 27 MMOL/L (ref 22–32)
CREAT SERPL-MCNC: 1.29 MG/DL (ref 0.5–1.5)
CREAT SERPL-MCNC: 1.29 MG/DL (ref 0.5–1.5)
EOSINOPHIL # BLD: 0.1 K/UL (ref 0–0.7)
EOSINOPHIL NFR BLD: 2 %
ERYTHROCYTE [DISTWIDTH] IN BLOOD BY AUTOMATED COUNT: 15.6 % (ref 11–15)
GLOBULIN PLAS-MCNC: 4 G/DL (ref 2.5–3.7)
GLUCOSE BLDC GLUCOMTR-MCNC: 111 MG/DL (ref 70–99)
GLUCOSE BLDC GLUCOMTR-MCNC: 169 MG/DL (ref 70–99)
GLUCOSE BLDC GLUCOMTR-MCNC: 98 MG/DL (ref 70–99)
GLUCOSE BLDC GLUCOMTR-MCNC: 99 MG/DL (ref 70–99)
GLUCOSE SERPL-MCNC: 175 MG/DL (ref 70–99)
GLUCOSE SERPL-MCNC: 175 MG/DL (ref 70–99)
HCT VFR BLD AUTO: 33.9 % (ref 35–48)
HGB BLD-MCNC: 11.4 G/DL (ref 12–16)
LYMPHOCYTES # BLD: 0.6 K/UL (ref 1–4)
LYMPHOCYTES NFR BLD: 7 %
MAGNESIUM SERPL-MCNC: 1.7 MG/DL (ref 1.8–2.5)
MCH RBC QN AUTO: 30.5 PG (ref 27–32)
MCHC RBC AUTO-ENTMCNC: 33.5 G/DL (ref 32–37)
MCV RBC AUTO: 91.1 FL (ref 80–100)
METAMYELOCYTES # BLD MANUAL: 0.07 K/UL
METAMYELOCYTES # BLD MANUAL: 0.14 K/UL
METAMYELOCYTES NFR BLD: 1 %
MONOCYTES # BLD: 0.6 K/UL (ref 0–1)
MONOCYTES NFR BLD: 9 %
MYELOCYTES NFR BLD: 2 %
NEUTROPHILS # BLD AUTO: 5.4 K/UL (ref 1.8–7.7)
NEUTROPHILS NFR BLD: 71 %
NEUTS BAND NFR BLD: 7 %
NRBC BLD-RTO: 1 % (ref ?–1)
OSMOLALITY UR CALC.SUM OF ELEC: 304 MOSM/KG (ref 275–295)
OSMOLALITY UR CALC.SUM OF ELEC: 304 MOSM/KG (ref 275–295)
PHOSPHATE SERPL-MCNC: 2.4 MG/DL (ref 2.4–4.7)
PLATELET # BLD AUTO: 192 K/UL (ref 140–400)
PMV BLD AUTO: 8.8 FL (ref 7.4–10.3)
POTASSIUM SERPL-SCNC: 3.3 MMOL/L (ref 3.3–5.1)
POTASSIUM SERPL-SCNC: 3.3 MMOL/L (ref 3.3–5.1)
PROT SERPL-MCNC: 6.1 G/DL (ref 5.9–8.4)
RBC # BLD AUTO: 3.72 M/UL (ref 3.7–5.4)
SODIUM SERPL-SCNC: 141 MMOL/L (ref 136–144)
SODIUM SERPL-SCNC: 141 MMOL/L (ref 136–144)
VARIANT LYMPHS NFR BLD MANUAL: 1 %
WBC # BLD AUTO: 6.9 K/UL (ref 4–11)

## 2018-06-24 PROCEDURE — A4216 STERILE WATER/SALINE, 10 ML: HCPCS | Performed by: HOSPITALIST

## 2018-06-24 PROCEDURE — 83735 ASSAY OF MAGNESIUM: CPT | Performed by: HOSPITALIST

## 2018-06-24 PROCEDURE — C9113 INJ PANTOPRAZOLE SODIUM, VIA: HCPCS | Performed by: HOSPITALIST

## 2018-06-24 PROCEDURE — 82962 GLUCOSE BLOOD TEST: CPT

## 2018-06-24 PROCEDURE — A4216 STERILE WATER/SALINE, 10 ML: HCPCS | Performed by: INTERNAL MEDICINE

## 2018-06-24 PROCEDURE — 80053 COMPREHEN METABOLIC PANEL: CPT | Performed by: HOSPITALIST

## 2018-06-24 PROCEDURE — 85025 COMPLETE CBC W/AUTO DIFF WBC: CPT | Performed by: HOSPITALIST

## 2018-06-24 PROCEDURE — 92526 ORAL FUNCTION THERAPY: CPT

## 2018-06-24 PROCEDURE — 85027 COMPLETE CBC AUTOMATED: CPT | Performed by: HOSPITALIST

## 2018-06-24 PROCEDURE — 85007 BL SMEAR W/DIFF WBC COUNT: CPT | Performed by: HOSPITALIST

## 2018-06-24 PROCEDURE — 84100 ASSAY OF PHOSPHORUS: CPT | Performed by: HOSPITALIST

## 2018-06-24 RX ORDER — POTASSIUM CHLORIDE 29.8 MG/ML
40 INJECTION INTRAVENOUS ONCE
Status: COMPLETED | OUTPATIENT
Start: 2018-06-24 | End: 2018-06-24

## 2018-06-24 RX ORDER — HYDROCODONE BITARTRATE AND ACETAMINOPHEN 5; 325 MG/1; MG/1
1 TABLET ORAL EVERY 6 HOURS PRN
Status: DISCONTINUED | OUTPATIENT
Start: 2018-06-24 | End: 2018-07-01

## 2018-06-24 RX ORDER — MAGNESIUM SULFATE HEPTAHYDRATE 40 MG/ML
2 INJECTION, SOLUTION INTRAVENOUS ONCE
Status: COMPLETED | OUTPATIENT
Start: 2018-06-24 | End: 2018-06-24

## 2018-06-24 RX ORDER — MORPHINE SULFATE 2 MG/ML
1 INJECTION, SOLUTION INTRAMUSCULAR; INTRAVENOUS
Status: DISCONTINUED | OUTPATIENT
Start: 2018-06-24 | End: 2018-07-01

## 2018-06-24 RX ORDER — VALSARTAN 320 MG/1
320 TABLET ORAL DAILY
Status: DISCONTINUED | OUTPATIENT
Start: 2018-06-24 | End: 2018-06-26

## 2018-06-24 RX ORDER — LIDOCAINE 50 MG/G
1 PATCH TOPICAL DAILY
Status: DISCONTINUED | OUTPATIENT
Start: 2018-06-24 | End: 2018-07-10

## 2018-06-24 NOTE — PROGRESS NOTES
[facility]  Gastroenterology Progress Note    Elaina Webb Patient Status:  Inpatient    1925 MRN [id number]   Location [facility] 3W/SW Attending Flor Andrew MD   [facility] Day # 7 PCP REMI MCCOLLUM DO, DO     Subjective:   Randi English [facility] continue to trend. Hgb is also stable. Cholecystitis, while not clear on u/s but notable on other imaging, could be responsible as well.  Most likely surgery for fozia +/- hernia repair  -c/w abx zosyn  -c/w PPI  -x-ray to follow up pancreatic stent in 4 we

## 2018-06-24 NOTE — PROGRESS NOTES
[facility]    Progress Note    Ana Nelson Patient Status:  Inpatient    1925 MRN [id number]   Location [facility] 3W/SW Attending Moe Jeffries MD   [facility] Day # 7 PCP REMI MCCOLLUM DO, DO       Subjective:    Julianne Út 14. 06/24/2018   BILT 2.3 (H) 06/24/2018   TP 6.1 06/24/2018   AST 76 (H) 06/24/2018    (H) 06/24/2018    (H) 06/16/2018   MG 1.7 (L) 06/24/2018   PHOS 2.4 06/24/2018   TROP 0.01 06/16/2018                   Zulma Figueredo MD  6/24/2018

## 2018-06-24 NOTE — PLAN OF CARE
GASTROINTESTINAL - ADULT    • Minimal or absence of nausea and vomiting Progressing    • Maintains or returns to baseline bowel function Progressing    Some nausea overnight.  No emesis    NEUROLOGICAL - ADULT    • Achieves stable or improved neurological s

## 2018-06-24 NOTE — PROGRESS NOTES
[facility] Hospitalist Progress Note     CC: Hospital Follow up    PCP: REMI MCCOLLUM DO,        Assessment/Plan:     Principal Problem:    Acute pancreatitis, unspecified complication status, unspecified pancreatitis type  Active Problems:    Acute pancreatitis 6/21  - stop IVF, nebs prn, lasix prn, aggressive IS  - Pulm consulted  - poss aspiration event on 6/21  - SLP did re-eval, pt upgraded to thin liquids  - zosyn    Toe discoloration/blue toe syndrome  -noted this am  -non painful, dp pulses intact b/l  -di morning, feeling hungry, thirsty. Sob improving. OBJECTIVE:    Blood pressure (!) 161/96, pulse 100, temperature 97.9 °F (36.6 °C), temperature source Oral, resp. rate 20, height 158.8 cm (5' 2.5\"), weight 166 lb 8 oz (75.5 kg), SpO2 98 %.     Temp:  [9 36*   CA  8.4*  8.3*  8.6  8.6   NA  142  140  141  141   K  2.9*  2.9*  2.8*  3.4  3.3  3.3   CL  113*  109  107  107   CO2  24  24  27  27       Recent Labs   Lab  06/20/18   0411  06/21/18   0509  06/22/18   0430  06/23/18   0529  06/24/18   0402   ALT

## 2018-06-24 NOTE — SLP NOTE
SPEECH DAILY NOTE - INPATIENT Re-evaluation of liquids     ASSESSMENT & PLAN   ASSESSMENT  Patient seen to monitor tolerance of PO diet and assess candidacy for upgrade to thin liquids. Patient still not cleared for solid foods.   Presented nectar thick li on clear liquid diet. New goal:  Patient will tolerate [facility] soft diet with thin liquids in small sips with chin tuck without overt signs of aspiration with 100% accuracy over 3 sessions.     goal met  And upgraded   Goal #2 The patient/family/caregiver erik

## 2018-06-24 NOTE — PROGRESS NOTES
[facility]    CDI Prediction Tool Protocol (Vancomycin Initiated)    OVP (oral vancomycin prophylaxis) 125 mg PO Daily is being started in this patient based on a score of 13.       Score Breakdown:  High risk antibiotic use (5 points)  Hospital

## 2018-06-24 NOTE — PROGRESS NOTES
[facility]    Progress Note    Chava Moeller Patient Status:  Inpatient    1925 MRN [id number]   Location [facility] 5SW/SE Attending Malcolm Scruggs MD   Hosp Day # 7 PCP REMI MCCOLLUM DO, DO     Subjective:     Patient has is i possible cause for the aspiration. Patient's been worked up and treated for possible aspiration, though pt states swollowing well now  I had extensive discussion with pt and daughter today, 6/23.  I pulled up CT scan and showed situation of abd contents in K  2.9*  2.9*  2.8*  3.4  3.3  3.3   CL  113*  109  107  107   CO2  24  24  27  27   ALKPHO  131*  136*  151*   AST  147*  105*  76*   ALT  124*  116*  101*   BILT  3.4*  2.4*  2.3*   TP  5.3*  5.3*  6.1

## 2018-06-24 NOTE — PROGRESS NOTES
ASSESSMENT/PLAN:    Impression: 1. Atrial fibrillation with rapid ventricular response is now converted to sinus rhythm. Holding sinus on iv lopressor  Echo reviewed; no recurrence ovenight  2.   Acute gallstone pancreatitis with volvulus. lft's better; (75.5 kg)   LMP  (LMP Unknown)   SpO2 95%   BMI 29.97 kg/m²   CONS: well developed, well nourished. WEIGHT:discussed. HEAD/FACE:no trauma, normocephalic. EYES:conjunctiva not injected, no xanthelasma.  ENT:mucosa pink and moist. NECK:jugular venous pressure

## 2018-06-24 NOTE — PROGRESS NOTES
[facility]  Vascular Surgery Progress Note    Jonel Ramirez Patient Status:  Inpatient    1925 MRN [id number]   Location [facility] 3W/SW Attending Dany Sood MD   Hosp Day # 7 PCP REMI MCCOLLUM DO, DO       Assessment and Plan:  The 06/24/2018   ALB 2.1 06/24/2018   TP 6.1 06/24/2018       Allergies:   No Known Allergies    Medications:    Current Facility-Administered Medications:  Vancomycin HCl (FIRVANQ) 50 MG/ML oral solution 125 mg 125 mg Oral Daily   metoprolol Tartrate (Umberto Leger

## 2018-06-24 NOTE — PROGRESS NOTES
Pulmonary Progress Note     Assessment / Plan:  1. Gallstone pancreatitis - s/p ERCP  - Zosyn  - pain control  - plan for cholecystectomy early this week  - GI and surgery following  2.  Acute hypoxic respiratory failure - due to aspiration and mild pulm ed

## 2018-06-25 ENCOUNTER — APPOINTMENT (OUTPATIENT)
Dept: NUCLEAR MEDICINE | Facility: HOSPITAL | Age: 83
DRG: 438 | End: 2018-06-25
Attending: SURGERY
Payer: MEDICARE

## 2018-06-25 ENCOUNTER — APPOINTMENT (OUTPATIENT)
Dept: CT IMAGING | Facility: HOSPITAL | Age: 83
DRG: 438 | End: 2018-06-25
Attending: SURGERY
Payer: MEDICARE

## 2018-06-25 LAB
ALBUMIN SERPL BCP-MCNC: 2.1 G/DL (ref 3.5–4.8)
ALBUMIN/GLOB SERPL: 0.6 {RATIO} (ref 1–2)
ALP SERPL-CCNC: 163 U/L (ref 32–100)
ALT SERPL-CCNC: 83 U/L (ref 14–54)
AMYLASE SERPL-CCNC: 63 U/L (ref 24–108)
ANION GAP SERPL CALC-SCNC: 7 MMOL/L (ref 0–18)
ANION GAP SERPL CALC-SCNC: 7 MMOL/L (ref 0–18)
AST SERPL-CCNC: 59 U/L (ref 15–41)
BASOPHILS # BLD: 0 K/UL (ref 0–0.2)
BASOPHILS NFR BLD: 0 %
BILIRUB SERPL-MCNC: 1.8 MG/DL (ref 0.3–1.2)
BUN SERPL-MCNC: 33 MG/DL (ref 8–20)
BUN SERPL-MCNC: 33 MG/DL (ref 8–20)
BUN/CREAT SERPL: 27.5 (ref 10–20)
BUN/CREAT SERPL: 27.5 (ref 10–20)
CALCIUM SERPL-MCNC: 8.4 MG/DL (ref 8.5–10.5)
CALCIUM SERPL-MCNC: 8.4 MG/DL (ref 8.5–10.5)
CHLORIDE SERPL-SCNC: 106 MMOL/L (ref 95–110)
CHLORIDE SERPL-SCNC: 106 MMOL/L (ref 95–110)
CO2 SERPL-SCNC: 27 MMOL/L (ref 22–32)
CO2 SERPL-SCNC: 27 MMOL/L (ref 22–32)
CREAT SERPL-MCNC: 1.2 MG/DL (ref 0.5–1.5)
CREAT SERPL-MCNC: 1.2 MG/DL (ref 0.5–1.5)
EOSINOPHIL # BLD: 0 K/UL (ref 0–0.7)
EOSINOPHIL NFR BLD: 0 %
ERYTHROCYTE [DISTWIDTH] IN BLOOD BY AUTOMATED COUNT: 17.8 % (ref 11–15)
GLOBULIN PLAS-MCNC: 3.8 G/DL (ref 2.5–3.7)
GLUCOSE BLDC GLUCOMTR-MCNC: 134 MG/DL (ref 70–99)
GLUCOSE BLDC GLUCOMTR-MCNC: 180 MG/DL (ref 70–99)
GLUCOSE BLDC GLUCOMTR-MCNC: 187 MG/DL (ref 70–99)
GLUCOSE BLDC GLUCOMTR-MCNC: 83 MG/DL (ref 70–99)
GLUCOSE SERPL-MCNC: 182 MG/DL (ref 70–99)
GLUCOSE SERPL-MCNC: 182 MG/DL (ref 70–99)
HCT VFR BLD AUTO: 31.9 % (ref 35–48)
HGB BLD-MCNC: 10.5 G/DL (ref 12–16)
LIPASE SERPL-CCNC: 59 U/L (ref 22–51)
LYMPHOCYTES # BLD: 0.4 K/UL (ref 1–4)
LYMPHOCYTES NFR BLD: 6 %
MAGNESIUM SERPL-MCNC: 2.2 MG/DL (ref 1.8–2.5)
MCH RBC QN AUTO: 32.1 PG (ref 27–32)
MCHC RBC AUTO-ENTMCNC: 32.9 G/DL (ref 32–37)
MCV RBC AUTO: 97.8 FL (ref 80–100)
METAMYELOCYTES # BLD MANUAL: 0.27 K/UL
MONOCYTES # BLD: 0.3 K/UL (ref 0–1)
MONOCYTES NFR BLD: 5 %
MYELOCYTES NFR BLD: 4 %
NEUTROPHILS # BLD AUTO: 5.7 K/UL (ref 1.8–7.7)
NEUTROPHILS NFR BLD: 83 %
NEUTS BAND NFR BLD: 2 %
OSMOLALITY UR CALC.SUM OF ELEC: 302 MOSM/KG (ref 275–295)
OSMOLALITY UR CALC.SUM OF ELEC: 302 MOSM/KG (ref 275–295)
PHOSPHATE SERPL-MCNC: 3.7 MG/DL (ref 2.4–4.7)
PLATELET # BLD AUTO: 186 K/UL (ref 140–400)
PMV BLD AUTO: 8.6 FL (ref 7.4–10.3)
POTASSIUM SERPL-SCNC: 4.4 MMOL/L (ref 3.3–5.1)
POTASSIUM SERPL-SCNC: 4.4 MMOL/L (ref 3.3–5.1)
PROT SERPL-MCNC: 5.9 G/DL (ref 5.9–8.4)
RBC # BLD AUTO: 3.26 M/UL (ref 3.7–5.4)
SODIUM SERPL-SCNC: 140 MMOL/L (ref 136–144)
SODIUM SERPL-SCNC: 140 MMOL/L (ref 136–144)
WBC # BLD AUTO: 6.7 K/UL (ref 4–11)

## 2018-06-25 PROCEDURE — 83735 ASSAY OF MAGNESIUM: CPT | Performed by: HOSPITALIST

## 2018-06-25 PROCEDURE — 82150 ASSAY OF AMYLASE: CPT | Performed by: SURGERY

## 2018-06-25 PROCEDURE — 74176 CT ABD & PELVIS W/O CONTRAST: CPT | Performed by: SURGERY

## 2018-06-25 PROCEDURE — 85025 COMPLETE CBC W/AUTO DIFF WBC: CPT | Performed by: SURGERY

## 2018-06-25 PROCEDURE — C9113 INJ PANTOPRAZOLE SODIUM, VIA: HCPCS | Performed by: HOSPITALIST

## 2018-06-25 PROCEDURE — 85007 BL SMEAR W/DIFF WBC COUNT: CPT | Performed by: SURGERY

## 2018-06-25 PROCEDURE — 85027 COMPLETE CBC AUTOMATED: CPT | Performed by: SURGERY

## 2018-06-25 PROCEDURE — 83690 ASSAY OF LIPASE: CPT | Performed by: SURGERY

## 2018-06-25 PROCEDURE — 82962 GLUCOSE BLOOD TEST: CPT

## 2018-06-25 PROCEDURE — 78226 HEPATOBILIARY SYSTEM IMAGING: CPT | Performed by: SURGERY

## 2018-06-25 PROCEDURE — A4216 STERILE WATER/SALINE, 10 ML: HCPCS | Performed by: HOSPITALIST

## 2018-06-25 PROCEDURE — A4216 STERILE WATER/SALINE, 10 ML: HCPCS | Performed by: INTERNAL MEDICINE

## 2018-06-25 PROCEDURE — 80053 COMPREHEN METABOLIC PANEL: CPT | Performed by: HOSPITALIST

## 2018-06-25 PROCEDURE — 84100 ASSAY OF PHOSPHORUS: CPT | Performed by: HOSPITALIST

## 2018-06-25 PROCEDURE — 36592 COLLECT BLOOD FROM PICC: CPT

## 2018-06-25 RX ORDER — SODIUM CHLORIDE 9 MG/ML
INJECTION, SOLUTION INTRAVENOUS
Status: COMPLETED
Start: 2018-06-25 | End: 2018-06-25

## 2018-06-25 NOTE — PROGRESS NOTES
[facility] Pharmacy Note:  Renal Dose Adjustment    Zosyn (piperacillin/tazobactam) dosing had been previously adjusted by pharmacy due to renal insufficiency for Dank Jackson. Estimated Creatinine Clearance: 24.2 mL/min (based on SCr of 1.2 mg/dL).     Due

## 2018-06-25 NOTE — PROGRESS NOTES
[facility]  Progress Note    Karen Richards Patient Status:  Inpatient    1925 MRN [id number]   Location [facility] 3W/SW Attending Dorinda Green MD   Hosp Day # 8 PCP REMI MCCOLLUM DO, DO     Impression:   Harshade Intravenous Q3H PRN   lidocaine (LIDODERM) 5 % 1 patch 1 patch Transdermal Daily   Insulin Regular Human (NOVOLIN R) 100 UNIT/ML injection 1-5 Units 1-5 Units Subcutaneous 4 times per day   acetaminophen (TYLENOL) tab 650 mg 650 mg Oral Q6H PRN   dextrose

## 2018-06-25 NOTE — PLAN OF CARE
CONTINUING TPN, IV ANTIBIOTICS, C.T. ABDOMEN/PELVIS TODAY, HEPATOBILIARY SCAN TODAY, PLANS TO HOLD OFF ON SURGERY FOR NOW

## 2018-06-25 NOTE — PROGRESS NOTES
[facility]  Gastroenterology Progress Note    Contreras Bolden Patient Status:  Inpatient    1925 MRN [id number]   Location [facility] 3W/SW Attending Victoria Bradford MD   [facility] Day # 8 PCP REMI MCCOLLUM DO, DO     Subjective:  Mar Moderate right and small left pleural effusions have increased since prior. There is also trace new perihepatic and pelvic ascites. Nonspecific perinephric stranding and presacral space edema, both of which have also increased since prior.  In conjunction w aspiration pneumonia probably related in part to her large hiatal hernia. Surgical repair of hernia declined.   Ongoing discussions with pulmonary, cardiology and surgery regarding possible laparoscopic cholecystectomy soon if her overall condition warrant

## 2018-06-25 NOTE — OCCUPATIONAL THERAPY NOTE
Patient not available for OT treatment. Per chart review, she also has a diagnostic lap scheduled for tomorrow. Will follow up as able.

## 2018-06-25 NOTE — PROGRESS NOTES
[facility] AND [facility]  Vascular Surgery Progress Note    Norman Ramesh Patient Status:  Inpatient    1925 MRN [id number]   Location [facility] 3W/SW Attending Shannen Coley MD   Hosp Day # 8 PCP REMI MCCOLLUM DO, DO       Assessment and 22  BP: 155/86  The patient has normal pulses. The foot examination is as described above. The patient has normal motor and sensory function.     Labs:    Lab Results  Component Value Date   CREATSERUM 1.20 06/25/2018   CREATSERUM 1.20 06/25/2018   BUN 33 Intravenous Q12H   Pantoprazole Sodium (PROTONIX) 40 mg in Sodium Chloride 0.9 % 10 mL IV push 40 mg Intravenous Daily   hydrALAzine HCl (APRESOLINE) injection 10 mg 10 mg Intravenous Q4H PRN       Eros Harrington MD  6/25/2018  10:50 AM

## 2018-06-26 ENCOUNTER — ANESTHESIA (OUTPATIENT)
Dept: SURGERY | Facility: HOSPITAL | Age: 83
End: 2018-06-26

## 2018-06-26 ENCOUNTER — ANESTHESIA EVENT (OUTPATIENT)
Dept: SURGERY | Facility: HOSPITAL | Age: 83
End: 2018-06-26

## 2018-06-26 LAB
ANION GAP SERPL CALC-SCNC: 12 MMOL/L (ref 0–18)
ANION GAP SERPL CALC-SCNC: 9 MMOL/L (ref 0–18)
BASOPHILS # BLD: 0 K/UL (ref 0–0.2)
BASOPHILS NFR BLD: 0 %
BUN SERPL-MCNC: 41 MG/DL (ref 8–20)
BUN SERPL-MCNC: 41 MG/DL (ref 8–20)
BUN/CREAT SERPL: 30.4 (ref 10–20)
BUN/CREAT SERPL: 34.5 (ref 10–20)
CALCIUM SERPL-MCNC: 8.5 MG/DL (ref 8.5–10.5)
CALCIUM SERPL-MCNC: 8.7 MG/DL (ref 8.5–10.5)
CHLORIDE SERPL-SCNC: 104 MMOL/L (ref 95–110)
CHLORIDE SERPL-SCNC: 107 MMOL/L (ref 95–110)
CO2 SERPL-SCNC: 26 MMOL/L (ref 22–32)
CO2 SERPL-SCNC: 29 MMOL/L (ref 22–32)
CREAT SERPL-MCNC: 1.19 MG/DL (ref 0.5–1.5)
CREAT SERPL-MCNC: 1.35 MG/DL (ref 0.5–1.5)
EOSINOPHIL # BLD: 0.1 K/UL (ref 0–0.7)
EOSINOPHIL NFR BLD: 1 %
ERYTHROCYTE [DISTWIDTH] IN BLOOD BY AUTOMATED COUNT: 15 % (ref 11–15)
GLUCOSE BLDC GLUCOMTR-MCNC: 127 MG/DL (ref 70–99)
GLUCOSE BLDC GLUCOMTR-MCNC: 174 MG/DL (ref 70–99)
GLUCOSE BLDC GLUCOMTR-MCNC: 174 MG/DL (ref 70–99)
GLUCOSE BLDC GLUCOMTR-MCNC: 198 MG/DL (ref 70–99)
GLUCOSE SERPL-MCNC: 150 MG/DL (ref 70–99)
GLUCOSE SERPL-MCNC: 184 MG/DL (ref 70–99)
HCT VFR BLD AUTO: 31.3 % (ref 35–48)
HGB BLD-MCNC: 10.5 G/DL (ref 12–16)
LYMPHOCYTES # BLD: 0.4 K/UL (ref 1–4)
LYMPHOCYTES NFR BLD: 6 %
MAGNESIUM SERPL-MCNC: 2.4 MG/DL (ref 1.8–2.5)
MCH RBC QN AUTO: 30.6 PG (ref 27–32)
MCHC RBC AUTO-ENTMCNC: 33.6 G/DL (ref 32–37)
MCV RBC AUTO: 91.1 FL (ref 80–100)
METAMYELOCYTES # BLD MANUAL: 0.07 K/UL
METAMYELOCYTES # BLD MANUAL: 0.22 K/UL
METAMYELOCYTES NFR BLD: 3 %
MONOCYTES # BLD: 0.7 K/UL (ref 0–1)
MONOCYTES NFR BLD: 9 %
MYELOCYTES NFR BLD: 1 %
NEUTROPHILS # BLD AUTO: 5.9 K/UL (ref 1.8–7.7)
NEUTROPHILS NFR BLD: 76 %
NEUTS BAND NFR BLD: 4 %
OSMOLALITY UR CALC.SUM OF ELEC: 309 MOSM/KG (ref 275–295)
OSMOLALITY UR CALC.SUM OF ELEC: 313 MOSM/KG (ref 275–295)
PHOSPHATE SERPL-MCNC: 5 MG/DL (ref 2.4–4.7)
PLATELET # BLD AUTO: 195 K/UL (ref 140–400)
PMV BLD AUTO: 8.9 FL (ref 7.4–10.3)
POTASSIUM SERPL-SCNC: 4.6 MMOL/L (ref 3.3–5.1)
POTASSIUM SERPL-SCNC: 5.7 MMOL/L (ref 3.3–5.1)
RBC # BLD AUTO: 3.44 M/UL (ref 3.7–5.4)
SODIUM SERPL-SCNC: 142 MMOL/L (ref 136–144)
SODIUM SERPL-SCNC: 145 MMOL/L (ref 136–144)
WBC # BLD AUTO: 7.3 K/UL (ref 4–11)

## 2018-06-26 PROCEDURE — 83735 ASSAY OF MAGNESIUM: CPT | Performed by: INTERNAL MEDICINE

## 2018-06-26 PROCEDURE — C9113 INJ PANTOPRAZOLE SODIUM, VIA: HCPCS | Performed by: HOSPITALIST

## 2018-06-26 PROCEDURE — 94640 AIRWAY INHALATION TREATMENT: CPT

## 2018-06-26 PROCEDURE — A4216 STERILE WATER/SALINE, 10 ML: HCPCS | Performed by: INTERNAL MEDICINE

## 2018-06-26 PROCEDURE — 85027 COMPLETE CBC AUTOMATED: CPT | Performed by: INTERNAL MEDICINE

## 2018-06-26 PROCEDURE — 82962 GLUCOSE BLOOD TEST: CPT

## 2018-06-26 PROCEDURE — 84100 ASSAY OF PHOSPHORUS: CPT | Performed by: INTERNAL MEDICINE

## 2018-06-26 PROCEDURE — 85007 BL SMEAR W/DIFF WBC COUNT: CPT | Performed by: INTERNAL MEDICINE

## 2018-06-26 PROCEDURE — A4216 STERILE WATER/SALINE, 10 ML: HCPCS | Performed by: HOSPITALIST

## 2018-06-26 PROCEDURE — 80048 BASIC METABOLIC PNL TOTAL CA: CPT | Performed by: HOSPITALIST

## 2018-06-26 PROCEDURE — 85025 COMPLETE CBC W/AUTO DIFF WBC: CPT | Performed by: INTERNAL MEDICINE

## 2018-06-26 PROCEDURE — 80048 BASIC METABOLIC PNL TOTAL CA: CPT | Performed by: INTERNAL MEDICINE

## 2018-06-26 RX ORDER — SODIUM POLYSTYRENE SULFONATE 15 G/60ML
30 SUSPENSION ORAL; RECTAL ONCE
Status: COMPLETED | OUTPATIENT
Start: 2018-06-26 | End: 2018-06-26

## 2018-06-26 RX ORDER — FUROSEMIDE 10 MG/ML
40 INJECTION INTRAMUSCULAR; INTRAVENOUS ONCE
Status: COMPLETED | OUTPATIENT
Start: 2018-06-26 | End: 2018-06-26

## 2018-06-26 RX ORDER — HYDRALAZINE HYDROCHLORIDE 25 MG/1
25 TABLET, FILM COATED ORAL EVERY 8 HOURS SCHEDULED
Status: DISCONTINUED | OUTPATIENT
Start: 2018-06-26 | End: 2018-07-10

## 2018-06-26 RX ORDER — IPRATROPIUM BROMIDE AND ALBUTEROL SULFATE 2.5; .5 MG/3ML; MG/3ML
3 SOLUTION RESPIRATORY (INHALATION)
Status: DISCONTINUED | OUTPATIENT
Start: 2018-06-26 | End: 2018-07-04

## 2018-06-26 NOTE — PROGRESS NOTES
[facility]    Progress Note    Ynes Rayocher Patient Status:  Inpatient    1925 MRN [id number]   Location [facility] 3W/SW Attending Evelia Lambert MD   Hosp Day # 9 PCP REMI MCCOLLUM DO, DO       Subjective:    hCela Segura 33*  41*   CREATSERUM  1.29  1.29  1.20  1.20  1.19   GFRAA  42*  42*  45*  45*  46*   GFRNAA  36*  36*  39*  39*  40*   CA  8.6  8.6  8.4*  8.4*  8.5   NA  141  141  140  140  142   K  3.3  3.3  4.4  4.4  5.7*   CL  107  107  106  106  107   CO2  27  27 Hepatobiliary Scan  (cpt=78226)    Result Date: 6/25/2018  CONCLUSION:  Abnormal exam which demonstrates absence of gallbladder visualization along with hepatic hyperemia along the gallbladder fossa margin.   Findings are suspicious for acute cholecystitis/

## 2018-06-26 NOTE — PHYSICAL THERAPY NOTE
Attempted to review chart and see patient. She currently has K+ of 5.7, cut off is 5.0 for therapy intervention. Patient is on medication and has had one bowel movement, planned re-draw at 2:00 (30 min).  Went in to talk with pt/family and patient is sleepi

## 2018-06-26 NOTE — PLAN OF CARE
Patient:  Trina Lees  MRN: [id number]  : 1925  Allergies: No Known Allergies      IR MD/ APN Pre-Procedure Plan  (Inpatients Only)    Date of IR Procedure: 2018  Procedure to be performed: cholecystomy tube placement    Room Modality: Ult

## 2018-06-26 NOTE — PLAN OF CARE
POTASSIUM HIGH THIS AM, KAEXYLATE GIVEN, TPN STOPPED UNTIL NEW BAG WITHOUT POTASSIUM IS READY, IR NURSE TO TALK WITH FAMILY TODAY ABOUT POSSIBLE DRAIN AS OPPOSED TO CHOLECYSTECTOMY, CONTINUING IV ANTIBIOTICS, O2, PLAN TO TRANSFER TO REMOTE TELE THIS AFTERN

## 2018-06-26 NOTE — DIETARY NOTE
ADULT NUTRITION REASSESSMENT     Pt is at high nutrition risk. Pt does not meet malnutrition criteria. RECOMMENDATIONS TO MD:  See Nutrition Intervention.       NUTRITION DIAGNOSIS/PROBLEM:  Inadequate oral intake related to abdominal pain and diarrhe Food Supplements-Potential fortified juice (high protein) or Ensure pending diet intake as advance.    - Vitamin and mineral supplements: multivitamin/mineral, vitamin c, vitamin B12, vitamin D and calcium, B complex with C PTA.   - Feeding assistance: requ meeting 71% of minimum kcal and 87->100% of protein needs.     Food Allergies: No  Cultural/Ethnic/Faith Preferences: None    MEDICATIONS: reviewed   • ipratropium-albuterol  3 mL Nebulization Q6H WA   • hydrALAzine HCl  25 mg Oral Q8H [address]   • piperacil Ext [id number]

## 2018-06-26 NOTE — PROGRESS NOTES
[facility] Hospitalist Progress Note     CC: Hospital Follow up    PCP: REMI MCCOLLUM DO,        Assessment/Plan:     Principal Problem:    Acute pancreatitis, unspecified complication status, unspecified pancreatitis type  Active Problems:    Acute pancreatitis afternoon    Encephalopathy- improved   - likely multifactorial, mental status improved  - likely delirium due to acute infection, medications, hospitalization    AFib with RVR, now nsr  - post procedure on 6/18, dev Afib with rapid rates  - cont po lopres kg)  06/16/18 2024 : 135 lb (61.2 kg)      Exam    Gen: calm  Heent: NC AT, mucous memb clear   Pulm: Lungs with diminished BS,crackles  CV: Heart with regular rate and rhythm, trace edema   Abd: Abdomen soft tender to palpation, no rebound  MSK: Full rang pleural effusions have increased since prior. There is also trace new perihepatic and pelvic ascites. Nonspecific perinephric stranding and presacral space edema, both of which have also increased since prior.  In conjunction with anasarca, findings may ref pantoprazole (PROTONIX) IV push  40 mg Intravenous Daily     • adult 3 in 1 TPN 62.5 mL/hr at 06/25/18 2300   • dextrose       HYDROcodone-acetaminophen, morphINE sulfate, acetaminophen, dextrose, HYDROmorphone HCl, Normal Saline Flush, ipratropium-albuter

## 2018-06-26 NOTE — PROGRESS NOTES
[facility]    Progress Note    Eldon King Patient Status:  Inpatient    1925 MRN [id number]   Location [facility] 5SW/SE Attending Eric Lewis MD   Hosp Day # 9 PCP REMI MCCOLLUM, DO, DO     Subjective:     I had extensive and Plan:   Acute pancreatitis, unspecified complication status, unspecified pancreatitis type     Pancreatitis  Hiatal hernia    MRCP with mild CBD dilation, 2mm filling defect within the distal common bile duct, acute cholecystitis  ERCP performed and st status. Patient today was moved to another room she is states she is tired she is a bit more somnolent. He does answer questions and wakes up appears to be oriented.       IV protonix for hiatal hernia  Cont observation and medical tx for pancreatitis for --    --    ALT  116*  101*  83*   --    --    --    BILT  2.4*  2.3*  1.8*   --    --    --    TP  5.3*  6.1  5.9   --    --    --    VELVET   --    --    --   63   --    --        Ct Abdomen+pelvis(cpt=74176)    Result Date: 6/25/2018  CONCLUSION:  1.  Pam Whaley gallbladder visualization along with hepatic hyperemia along the gallbladder fossa margin. Findings are suspicious for acute cholecystitis/cystic duct obstruction .    Findings discussed with Dr. Farideh Fields on 06/25/18 at 1338 pm.  Dictated by (CST): Jori Thompson

## 2018-06-26 NOTE — PROGRESS NOTES
[facility]  Gastroenterology Progress Note    Yakov Leal Patient Status:  Inpatient    1925 MRN [id number]   Location [facility] 3W/SW Attending Darell Carmona MD   [facility] Day # 9 PCP REMI MCCOLLUM DO, DO     Subjective:  Mar bronchograms. Consider atelectasis or pneumonia/aspiration. 5. Large hiatal hernia containing nearly the entirety of the stomach as well as a long segment of the transverse colon.  6. Mild long segment wall thickening involving the distal transverse as well clinical condition and answered their questions. Christine Bowers MD [facility]  6/25/2018  4:45 PM

## 2018-06-26 NOTE — OCCUPATIONAL THERAPY NOTE
Attempted to see patient after approval  By ELLY Varner Pt potassium level 5.7 - cut off 5.0 - labs being redrawn 2:00 - level is not appropriate with this level for therapy participation .  pt is also planning on changing floors, sleeping soundly - will re

## 2018-06-26 NOTE — PROGRESS NOTES
ASSESSMENT/PLAN:    Impression: 1. Atrial fibrillation with rapid ventricular response is now converted to sinus rhythm. Holding sinus on po lopressor  Echo reviewed; no recurrence ovenight  2.   Acute gallstone pancreatitis with volvulus. lft's better; developed, well nourished. WEIGHT:discussed. HEAD/FACE:no trauma, normocephalic. EYES:conjunctiva not injected, no xanthelasma. ENT:mucosa pink and moist. NECK:jugular venous pressure not elevated. RESP:normal rate and rhythm, clear to auscultation.  GI: Fe

## 2018-06-26 NOTE — PROGRESS NOTES
Pulmonary Progress Note      NAME: Faye Ivey - ROOM: 340/340-A - MRN: [id number] - Age: 80year old - : 1925    Assessment/Plan:    1.  Acute hypoxic respiratory failure - due to aspiration and mild pulm edema. She has a very large hiatal her cooperative, no respiratory distress. HEENT: Normocephalic atraumatic. Lips, mucosa, and tongue normal.  No thrush noted. Lungs: diminshed throughout   Chest wall: No tenderness or deformity. Heart: Regular rate and rhythm, normal S1S2, no murmur.    A

## 2018-06-27 ENCOUNTER — APPOINTMENT (OUTPATIENT)
Dept: INTERVENTIONAL RADIOLOGY/VASCULAR | Facility: HOSPITAL | Age: 83
DRG: 438 | End: 2018-06-27
Attending: CLINICAL NURSE SPECIALIST
Payer: MEDICARE

## 2018-06-27 LAB
ALBUMIN SERPL BCP-MCNC: 2.3 G/DL (ref 3.5–4.8)
ANION GAP SERPL CALC-SCNC: 11 MMOL/L (ref 0–18)
ANION GAP SERPL CALC-SCNC: 11 MMOL/L (ref 0–18)
BASOPHILS # BLD: 0 K/UL (ref 0–0.2)
BASOPHILS NFR BLD: 0 %
BUN SERPL-MCNC: 35 MG/DL (ref 8–20)
BUN SERPL-MCNC: 35 MG/DL (ref 8–20)
BUN/CREAT SERPL: 26.3 (ref 10–20)
BUN/CREAT SERPL: 26.3 (ref 10–20)
CALCIUM SERPL-MCNC: 8.2 MG/DL (ref 8.5–10.5)
CALCIUM SERPL-MCNC: 8.2 MG/DL (ref 8.5–10.5)
CHLORIDE SERPL-SCNC: 99 MMOL/L (ref 95–110)
CHLORIDE SERPL-SCNC: 99 MMOL/L (ref 95–110)
CO2 SERPL-SCNC: 29 MMOL/L (ref 22–32)
CO2 SERPL-SCNC: 29 MMOL/L (ref 22–32)
CREAT SERPL-MCNC: 1.33 MG/DL (ref 0.5–1.5)
CREAT SERPL-MCNC: 1.33 MG/DL (ref 0.5–1.5)
EOSINOPHIL # BLD: 0.2 K/UL (ref 0–0.7)
EOSINOPHIL NFR BLD: 2 %
ERYTHROCYTE [DISTWIDTH] IN BLOOD BY AUTOMATED COUNT: 15.1 % (ref 11–15)
GLUCOSE BLDC GLUCOMTR-MCNC: 123 MG/DL (ref 70–99)
GLUCOSE BLDC GLUCOMTR-MCNC: 172 MG/DL (ref 70–99)
GLUCOSE BLDC GLUCOMTR-MCNC: 190 MG/DL (ref 70–99)
GLUCOSE BLDC GLUCOMTR-MCNC: 192 MG/DL (ref 70–99)
GLUCOSE SERPL-MCNC: 145 MG/DL (ref 70–99)
GLUCOSE SERPL-MCNC: 145 MG/DL (ref 70–99)
HCT VFR BLD AUTO: 31.2 % (ref 35–48)
HGB BLD-MCNC: 10.6 G/DL (ref 12–16)
INR BLD: 1 (ref 0.9–1.2)
LYMPHOCYTES # BLD: 0.7 K/UL (ref 1–4)
LYMPHOCYTES NFR BLD: 9 %
MAGNESIUM SERPL-MCNC: 2 MG/DL (ref 1.8–2.5)
MCH RBC QN AUTO: 30.9 PG (ref 27–32)
MCHC RBC AUTO-ENTMCNC: 34 G/DL (ref 32–37)
MCV RBC AUTO: 90.8 FL (ref 80–100)
METAMYELOCYTES # BLD MANUAL: 0.08 K/UL
MONOCYTES # BLD: 0.5 K/UL (ref 0–1)
MONOCYTES NFR BLD: 6 %
MYELOCYTES NFR BLD: 1 %
NEUTROPHILS # BLD AUTO: 6.5 K/UL (ref 1.8–7.7)
NEUTROPHILS NFR BLD: 77 %
NEUTS BAND NFR BLD: 5 %
OSMOLALITY UR CALC.SUM OF ELEC: 299 MOSM/KG (ref 275–295)
OSMOLALITY UR CALC.SUM OF ELEC: 299 MOSM/KG (ref 275–295)
PHOSPHATE SERPL-MCNC: 4.5 MG/DL (ref 2.4–4.7)
PLATELET # BLD AUTO: 201 K/UL (ref 140–400)
PMV BLD AUTO: 9.1 FL (ref 7.4–10.3)
POTASSIUM SERPL-SCNC: 3 MMOL/L (ref 3.3–5.1)
POTASSIUM SERPL-SCNC: 3 MMOL/L (ref 3.3–5.1)
PROTHROMBIN TIME: 12.6 SECONDS (ref 11.8–14.5)
RBC # BLD AUTO: 3.43 M/UL (ref 3.7–5.4)
SODIUM SERPL-SCNC: 139 MMOL/L (ref 136–144)
SODIUM SERPL-SCNC: 139 MMOL/L (ref 136–144)
WBC # BLD AUTO: 7.9 K/UL (ref 4–11)

## 2018-06-27 PROCEDURE — 85027 COMPLETE CBC AUTOMATED: CPT | Performed by: INTERNAL MEDICINE

## 2018-06-27 PROCEDURE — 94660 CPAP INITIATION&MGMT: CPT

## 2018-06-27 PROCEDURE — 85025 COMPLETE CBC W/AUTO DIFF WBC: CPT | Performed by: INTERNAL MEDICINE

## 2018-06-27 PROCEDURE — A4216 STERILE WATER/SALINE, 10 ML: HCPCS | Performed by: INTERNAL MEDICINE

## 2018-06-27 PROCEDURE — 82962 GLUCOSE BLOOD TEST: CPT

## 2018-06-27 PROCEDURE — 92526 ORAL FUNCTION THERAPY: CPT

## 2018-06-27 PROCEDURE — 36569 INSJ PICC 5 YR+ W/O IMAGING: CPT

## 2018-06-27 PROCEDURE — 94640 AIRWAY INHALATION TREATMENT: CPT

## 2018-06-27 PROCEDURE — 85007 BL SMEAR W/DIFF WBC COUNT: CPT | Performed by: INTERNAL MEDICINE

## 2018-06-27 PROCEDURE — 87070 CULTURE OTHR SPECIMN AEROBIC: CPT | Performed by: CLINICAL NURSE SPECIALIST

## 2018-06-27 PROCEDURE — 76937 US GUIDE VASCULAR ACCESS: CPT

## 2018-06-27 PROCEDURE — 85610 PROTHROMBIN TIME: CPT | Performed by: CLINICAL NURSE SPECIALIST

## 2018-06-27 PROCEDURE — 47533 PLMT BILIARY DRAINAGE CATH: CPT

## 2018-06-27 PROCEDURE — 87205 SMEAR GRAM STAIN: CPT | Performed by: CLINICAL NURSE SPECIALIST

## 2018-06-27 PROCEDURE — 80069 RENAL FUNCTION PANEL: CPT | Performed by: INTERNAL MEDICINE

## 2018-06-27 PROCEDURE — 87075 CULTR BACTERIA EXCEPT BLOOD: CPT | Performed by: CLINICAL NURSE SPECIALIST

## 2018-06-27 PROCEDURE — 0F9430Z DRAINAGE OF GALLBLADDER WITH DRAINAGE DEVICE, PERCUTANEOUS APPROACH: ICD-10-PCS | Performed by: RADIOLOGY

## 2018-06-27 PROCEDURE — A4216 STERILE WATER/SALINE, 10 ML: HCPCS | Performed by: HOSPITALIST

## 2018-06-27 PROCEDURE — C9113 INJ PANTOPRAZOLE SODIUM, VIA: HCPCS | Performed by: HOSPITALIST

## 2018-06-27 PROCEDURE — 83735 ASSAY OF MAGNESIUM: CPT | Performed by: INTERNAL MEDICINE

## 2018-06-27 RX ORDER — SODIUM CHLORIDE 9 MG/ML
INJECTION, SOLUTION INTRAVENOUS
Status: COMPLETED
Start: 2018-06-27 | End: 2018-06-27

## 2018-06-27 RX ORDER — SODIUM CHLORIDE 0.9 % (FLUSH) 0.9 %
10 SYRINGE (ML) INJECTION AS NEEDED
Status: DISCONTINUED | OUTPATIENT
Start: 2018-06-27 | End: 2018-06-30

## 2018-06-27 RX ORDER — POTASSIUM CHLORIDE 29.8 MG/ML
40 INJECTION INTRAVENOUS ONCE
Status: COMPLETED | OUTPATIENT
Start: 2018-06-27 | End: 2018-06-27

## 2018-06-27 RX ORDER — FUROSEMIDE 10 MG/ML
20 INJECTION INTRAMUSCULAR; INTRAVENOUS ONCE
Status: COMPLETED | OUTPATIENT
Start: 2018-06-27 | End: 2018-06-27

## 2018-06-27 RX ORDER — POTASSIUM CHLORIDE 14.9 MG/ML
20 INJECTION INTRAVENOUS ONCE
Status: COMPLETED | OUTPATIENT
Start: 2018-06-27 | End: 2018-06-28

## 2018-06-27 RX ORDER — LIDOCAINE HYDROCHLORIDE 10 MG/ML
0.5 INJECTION, SOLUTION INFILTRATION; PERINEURAL ONCE AS NEEDED
Status: ACTIVE | OUTPATIENT
Start: 2018-06-27 | End: 2018-06-27

## 2018-06-27 RX ORDER — MIDAZOLAM HYDROCHLORIDE 1 MG/ML
INJECTION INTRAMUSCULAR; INTRAVENOUS
Status: COMPLETED
Start: 2018-06-27 | End: 2018-06-27

## 2018-06-27 RX ORDER — LIDOCAINE HYDROCHLORIDE 20 MG/ML
INJECTION, SOLUTION EPIDURAL; INFILTRATION; INTRACAUDAL; PERINEURAL
Status: COMPLETED
Start: 2018-06-27 | End: 2018-06-27

## 2018-06-27 RX ORDER — CEFAZOLIN SODIUM/WATER 2 G/20 ML
SYRINGE (ML) INTRAVENOUS
Status: DISCONTINUED
Start: 2018-06-27 | End: 2018-06-27 | Stop reason: WASHOUT

## 2018-06-27 NOTE — PROGRESS NOTES
[facility] Hospitalist Progress Note     CC: Hospital Follow up    PCP: REMI MCCOLLUM DO,        Assessment/Plan:     Principal Problem:    Acute pancreatitis, unspecified complication status, unspecified pancreatitis type  Active Problems:    Acute pancreatitis mental status improved  - likely delirium due to acute infection, medications, hospitalization    AFib with RVR, now nsr  - post procedure on 6/18, dev Afib with rapid rates  - cont po lopressor, irbesartan on hold  - no anticoag at this time due to age, c 3100 ml   Net          -1557.5 ml       Last 3 Weights  06/27/18 0627 : 162 lb 4.8 oz (73.6 kg)  06/26/18 0444 : 169 lb 6.4 oz (76.8 kg)  06/25/18 0443 : 169 lb (76.7 kg)  06/24/18 0358 : 166 lb 8 oz (75.5 kg)  06/23/18 0600 : 168 lb 8 oz (76.4 kg)  06/ suggesting resolving acute interstitial edematous pancreatitis involving the pancreatic head/body, which appears improved since prior. No suspicious drainable peripancreatic fluid collection. Distal pancreatic duct stent.  No evidence of pancreatic or signi 6/25/2018 at 13:34     Approved by (CST): Selam Honeycutt MD on 6/25/2018 at 13:38              Meds:     • ipratropium-albuterol  3 mL Nebulization Q6H WA   • hydrALAzine HCl  25 mg Oral Q8H [address]   • piperacillin-tazobactam  3.375 g Intravenous Q8H   • Vancom

## 2018-06-27 NOTE — PROCEDURES
[facility] HOSP - [facility]  Procedure Note    Wilianntjeff Chalino Patient Status:  Inpatient    1925 MRN [id number]   Location [facility] Attending Lex Crespo MD   Hosp Day # 10 PCP REMI MCCOLLUM DO,      Proced

## 2018-06-27 NOTE — PLAN OF CARE
Problem: Patient Centered Care  Goal: Patient preferences are identified and integrated in the patient's plan of care  Interventions:  - What would you like us to know as we care for you?  Lives at home with Son and family  - Provide timely, complete, and a stay between 93-95% on 4L nasal cannula. Cont pulse ox maintained while asleep.        Problem: GASTROINTESTINAL - ADULT  Goal: Minimal or absence of nausea and vomiting  INTERVENTIONS:  - Maintain adequate hydration with IV or PO as ordered and tolerated affect risk of falls.   - [place] fall precautions as indicated by assessment.  - Educate pt/family on patient safety including physical limitations  - Instruct pt to call for assistance with activity based on assessment  - Modify environment to reduce ri

## 2018-06-27 NOTE — PROGRESS NOTES
Pulmonary Progress Note     Assessment / Plan:  1. Acute hypoxic respiratory failure - due to aspiration, mild pulm edema and bilateral pleural effusions. She has a very large hiatal hernia that is likely contributing to her recurrent aspiration as well.  R

## 2018-06-27 NOTE — PROGRESS NOTES
[facility] AND [facility]  Gastroenterology Progress Note    Pa Kevin Patient Status:  Inpatient    1925 MRN [id number]   Location [facility] 5SW/SE Attending Mario Holbrook MD   Hosp Day # 10 PCP REMI MCCOLLUM DO, DO     Subjective:  [employer]

## 2018-06-27 NOTE — SLP NOTE
SPEECH DAILY NOTE - INPATIENT Re-evaluation of liquids     ASSESSMENT & PLAN   ASSESSMENT  Patient seen to monitor tolerance of PO diet and to train compensatory swallow strategies.   Patient still not cleared for solid foods following drain that was just p tolerate trial upgrade of hard solid consistency and thin liquids without overt signs or symptoms of aspiration with 100 % accuracy over 2 session(s). Patient is now on thin liquids but awaiting clearance for solids.   In Progress   Goal #4 The patient

## 2018-06-27 NOTE — OCCUPATIONAL THERAPY NOTE
Pt not seen for OT at this time secondary to pt not available 2/ PICC line insertion. Will attempt to see pt next date as schedule permits.

## 2018-06-27 NOTE — PROGRESS NOTES
Yousif Sood  [id number]  6/27/2018    Post procedure/ recovery hand-off report given to [facility]. Patient's vital signs stable, access site dry and intact, no signs and symptoms of bleeding/ hematoma. Patient pulling at drain.  Nurse informed, dressing

## 2018-06-27 NOTE — CONSULTS
[facility]    PATIENT'S NAME: Ursula [facility]   ATTENDING PHYSICIAN: 960 Jamila Quyen Blair MD   CONSULTING PHYSICIAN: Romana Mire.  Jake Rod MD   PATIENT ACCOUNT#:   [id number]    LOCATION:  [address] [place] [address] #:   [id number]       DATE OF BIR and I called the daughter, spoke with both of them on the phone again in detail. We went ahead and cancelled the surgery. I did think it best to go ahead and drain the gallbladder. She had a biliary scan on Monday which showed nonvisualized gallbladder.

## 2018-06-27 NOTE — PHYSICAL THERAPY NOTE
Attempted treatment pt returned from her procedure and is on bed rest. Will re-attempt tomorrow if appropriate.

## 2018-06-28 LAB
ALBUMIN SERPL BCP-MCNC: 2 G/DL (ref 3.5–4.8)
ALP SERPL-CCNC: 161 U/L (ref 32–100)
ALT SERPL-CCNC: 61 U/L (ref 14–54)
ANION GAP SERPL CALC-SCNC: 6 MMOL/L (ref 0–18)
AST SERPL-CCNC: 59 U/L (ref 15–41)
BASOPHILS # BLD: 0 K/UL (ref 0–0.2)
BASOPHILS NFR BLD: 0 %
BILIRUB DIRECT SERPL-MCNC: 0.6 MG/DL (ref 0–0.2)
BILIRUB SERPL-MCNC: 1.4 MG/DL (ref 0.3–1.2)
BUN SERPL-MCNC: 34 MG/DL (ref 8–20)
BUN/CREAT SERPL: 28.6 (ref 10–20)
CALCIUM SERPL-MCNC: 8 MG/DL (ref 8.5–10.5)
CHLORIDE SERPL-SCNC: 99 MMOL/L (ref 95–110)
CO2 SERPL-SCNC: 33 MMOL/L (ref 22–32)
CREAT SERPL-MCNC: 1.19 MG/DL (ref 0.5–1.5)
EOSINOPHIL # BLD: 0.2 K/UL (ref 0–0.7)
EOSINOPHIL NFR BLD: 2 %
ERYTHROCYTE [DISTWIDTH] IN BLOOD BY AUTOMATED COUNT: 14.9 % (ref 11–15)
GLUCOSE BLDC GLUCOMTR-MCNC: 126 MG/DL (ref 70–99)
GLUCOSE BLDC GLUCOMTR-MCNC: 168 MG/DL (ref 70–99)
GLUCOSE BLDC GLUCOMTR-MCNC: 240 MG/DL (ref 70–99)
GLUCOSE BLDC GLUCOMTR-MCNC: 269 MG/DL (ref 70–99)
GLUCOSE SERPL-MCNC: 113 MG/DL (ref 70–99)
HCT VFR BLD AUTO: 28.5 % (ref 35–48)
HGB BLD-MCNC: 9.6 G/DL (ref 12–16)
LYMPHOCYTES # BLD: 0.9 K/UL (ref 1–4)
LYMPHOCYTES NFR BLD: 11 %
MAGNESIUM SERPL-MCNC: 1.8 MG/DL (ref 1.8–2.5)
MCH RBC QN AUTO: 30.8 PG (ref 27–32)
MCHC RBC AUTO-ENTMCNC: 33.8 G/DL (ref 32–37)
MCV RBC AUTO: 91.3 FL (ref 80–100)
MONOCYTES # BLD: 0.4 K/UL (ref 0–1)
MONOCYTES NFR BLD: 5 %
NEUTROPHILS # BLD AUTO: 6.4 K/UL (ref 1.8–7.7)
NEUTROPHILS NFR BLD: 81 %
OSMOLALITY UR CALC.SUM OF ELEC: 294 MOSM/KG (ref 275–295)
PHOSPHATE SERPL-MCNC: 3.8 MG/DL (ref 2.4–4.7)
PLATELET # BLD AUTO: 205 K/UL (ref 140–400)
PMV BLD AUTO: 9.6 FL (ref 7.4–10.3)
POTASSIUM SERPL-SCNC: 3.3 MMOL/L (ref 3.3–5.1)
POTASSIUM SERPL-SCNC: 3.3 MMOL/L (ref 3.3–5.1)
PROT SERPL-MCNC: 5.9 G/DL (ref 5.9–8.4)
RBC # BLD AUTO: 3.12 M/UL (ref 3.7–5.4)
SODIUM SERPL-SCNC: 138 MMOL/L (ref 136–144)
WBC # BLD AUTO: 8 K/UL (ref 4–11)

## 2018-06-28 PROCEDURE — 80076 HEPATIC FUNCTION PANEL: CPT | Performed by: HOSPITALIST

## 2018-06-28 PROCEDURE — 82962 GLUCOSE BLOOD TEST: CPT

## 2018-06-28 PROCEDURE — 97535 SELF CARE MNGMENT TRAINING: CPT

## 2018-06-28 PROCEDURE — A4216 STERILE WATER/SALINE, 10 ML: HCPCS | Performed by: HOSPITALIST

## 2018-06-28 PROCEDURE — 94640 AIRWAY INHALATION TREATMENT: CPT

## 2018-06-28 PROCEDURE — 80048 BASIC METABOLIC PNL TOTAL CA: CPT | Performed by: INTERNAL MEDICINE

## 2018-06-28 PROCEDURE — 97530 THERAPEUTIC ACTIVITIES: CPT

## 2018-06-28 PROCEDURE — 85025 COMPLETE CBC W/AUTO DIFF WBC: CPT | Performed by: HOSPITALIST

## 2018-06-28 PROCEDURE — A4216 STERILE WATER/SALINE, 10 ML: HCPCS | Performed by: INTERNAL MEDICINE

## 2018-06-28 PROCEDURE — 84132 ASSAY OF SERUM POTASSIUM: CPT | Performed by: INTERNAL MEDICINE

## 2018-06-28 PROCEDURE — 84100 ASSAY OF PHOSPHORUS: CPT | Performed by: INTERNAL MEDICINE

## 2018-06-28 PROCEDURE — C9113 INJ PANTOPRAZOLE SODIUM, VIA: HCPCS | Performed by: HOSPITALIST

## 2018-06-28 PROCEDURE — 36592 COLLECT BLOOD FROM PICC: CPT

## 2018-06-28 PROCEDURE — 83735 ASSAY OF MAGNESIUM: CPT | Performed by: INTERNAL MEDICINE

## 2018-06-28 RX ORDER — MAGNESIUM SULFATE HEPTAHYDRATE 40 MG/ML
2 INJECTION, SOLUTION INTRAVENOUS ONCE
Status: COMPLETED | OUTPATIENT
Start: 2018-06-28 | End: 2018-06-28

## 2018-06-28 RX ORDER — POTASSIUM CHLORIDE 29.8 MG/ML
40 INJECTION INTRAVENOUS ONCE
Status: COMPLETED | OUTPATIENT
Start: 2018-06-28 | End: 2018-06-28

## 2018-06-28 NOTE — PROGRESS NOTES
[facility]  Progress Note    Norman Ramesh Patient Status:  Inpatient    1925 MRN [id number]   Location [facility] 5SW/SE Attending Hema Odell MD   Hosp Day # 11 PCP REMI MCCOLLUM DO, DO       Subjective:   Mild discomfort w

## 2018-06-28 NOTE — PROGRESS NOTES
[facility] Hospitalist Progress Note     CC: Hospital Follow up    PCP: REMI MCCOLLUM DO,        Assessment/Plan:     Principal Problem:    Acute pancreatitis, unspecified complication status, unspecified pancreatitis type  Active Problems:    Acute pancreatitis mental status improved  - likely delirium due to acute infection, medications, hospitalization    AFib with RVR, now nsr  - post procedure on 6/18, dev Afib with rapid rates  - cont po lopressor, irbesartan on hold  - no anticoag at this time due to age, c (76 kg)  06/18/18 2100 : 161 lb 9.6 oz (73.3 kg)  06/17/18 0124 : 156 lb 8 oz (71 kg)  06/16/18 2024 : 135 lb (61.2 kg)      Exam    Gen: calm  Heent: NC AT, mucous memb clear   Pulm: Lungs with diminished BS,crackles  CV: Heart with regular rate and rhyth Cholecystostomy    Result Date: 6/27/2018  CONCLUSION:  1. Successful placement of percutaneous cholecystostomy tube via transhepatic approach. Sample aspirate has been submitted for microbiologic analysis.  2. Cholecystostomy tube is to remain attached to

## 2018-06-28 NOTE — PROGRESS NOTES
Cardiology  Doing well tired  p 70 and reg resp 18  O x 3; reasonalbe mood  cv s1 s2 reg    Imp: atrial fib converted to sinus and holding sinus rhythm    Rec: continue lopressor  will follow peripherally.   Please call with any questions  Thanks negative detailed exam

## 2018-06-28 NOTE — PLAN OF CARE
Problem: Patient Centered Care  Goal: Patient preferences are identified and integrated in the patient's plan of care  Interventions:  - What would you like us to know as we care for you?  Lives at home with Son and family  - Provide timely, complete, and a signs/symptoms of CO2 retention   Outcome: Progressing  Family and patient have refused BIPAP tonight. Random pulse oximeter checked through the night. Currently on 3L of oxygen via nasal cannula and saturating a t 97%. No SOB or distress noted.     Problem appropriate   Outcome: Progressing      Problem: SAFETY ADULT - FALL  Goal: Free from fall injury  INTERVENTIONS:  - Assess pt frequently for physical needs  - Identify cognitive and physical deficits and behaviors that affect risk of falls.   - [place] f

## 2018-06-28 NOTE — PROGRESS NOTES
Pulmonary Progress Note      NAME: Trina Lees - ROOM: 560/560-A - MRN: [id number] - Age: 80year old - : 1925    Assessment/Plan:  1.  Acute hypoxic respiratory failure - due to aspiration, mild pulm edema and bilateral pleural effusions. Larg sounds   Respiratory: CTAB   GI: Soft, NTND, +normoactive BS, perc fozia tube in place  Ext: No cyanosis, clubbing or edema   Neuro: Interactive, answering questions appropriately, no focal deficits  Psych: Appropriate mood, appropriate affect    Recent [place]

## 2018-06-28 NOTE — PROGRESS NOTES
[facility]  Gastroenterology Progress Note    Eldon King Patient Status:  Inpatient    1925 MRN [id number]   Location [facility] 5SW/SE Attending Shana Lesches, MD   Hosp Day # 11 PCP REMI MCCOLLUM DO, DO     Subjective:  Juanjo Dutta drainage. Gram stain shows 4+ white blood cells, no growth so far. Patient looks/feels better. Continue antibiotics and observation. Natasha Stanford MD [facility]  6/27/2018  3:33 PM

## 2018-06-28 NOTE — OCCUPATIONAL THERAPY NOTE
OCCUPATIONAL THERAPY TREATMENT NOTE - INPATIENT        Room Number: 560/560-A           Presenting Problem: pancreatitis     Problem List  Principal Problem:    Acute pancreatitis, unspecified complication status, unspecified pancreatitis type  Active Prob None                PAIN ASSESSMENT  Rating: Unable to rate  Location:  (generalized in abdomen with mobility)  Management Techniques:  Activity promotion     ACTIVITY TOLERANCE  O2 Saturation: 88%  Liters of O2:  3  Shortness of breath    ACTIVITIES OF JOSEY max assist . Pt with christian    Patient will complete grooming in standing at the sink x 3 min w/ SBA  Comment:pt tolerated standing 2 1/2 min from chair, taking 2 steps and cues to not lean on chair.  Pt deconditioned    Patient will complete LE dressing w/

## 2018-06-28 NOTE — PHYSICAL THERAPY NOTE
Attempted to see patient for physical therapy session. Per RN, patient just got back to bed and reports being tired. Patient got to bedside chair with OT in the AM. Will attempt to see patient tomorrow for physical therapy session. RN aware.

## 2018-06-29 LAB
ANION GAP SERPL CALC-SCNC: 8 MMOL/L (ref 0–18)
BASOPHILS # BLD: 0.1 K/UL (ref 0–0.2)
BASOPHILS NFR BLD: 1 %
BUN SERPL-MCNC: 37 MG/DL (ref 8–20)
BUN/CREAT SERPL: 31.9 (ref 10–20)
CALCIUM SERPL-MCNC: 8 MG/DL (ref 8.5–10.5)
CHLORIDE SERPL-SCNC: 97 MMOL/L (ref 95–110)
CO2 SERPL-SCNC: 31 MMOL/L (ref 22–32)
CREAT SERPL-MCNC: 1.16 MG/DL (ref 0.5–1.5)
EOSINOPHIL # BLD: 0.2 K/UL (ref 0–0.7)
EOSINOPHIL NFR BLD: 2 %
ERYTHROCYTE [DISTWIDTH] IN BLOOD BY AUTOMATED COUNT: 15.1 % (ref 11–15)
GLUCOSE BLDC GLUCOMTR-MCNC: 156 MG/DL (ref 70–99)
GLUCOSE BLDC GLUCOMTR-MCNC: 158 MG/DL (ref 70–99)
GLUCOSE BLDC GLUCOMTR-MCNC: 172 MG/DL (ref 70–99)
GLUCOSE BLDC GLUCOMTR-MCNC: 183 MG/DL (ref 70–99)
GLUCOSE SERPL-MCNC: 146 MG/DL (ref 70–99)
HCT VFR BLD AUTO: 29.2 % (ref 35–48)
HGB BLD-MCNC: 9.7 G/DL (ref 12–16)
LYMPHOCYTES # BLD: 1.1 K/UL (ref 1–4)
LYMPHOCYTES NFR BLD: 14 %
MAGNESIUM SERPL-MCNC: 2.4 MG/DL (ref 1.8–2.5)
MCH RBC QN AUTO: 30.5 PG (ref 27–32)
MCHC RBC AUTO-ENTMCNC: 33.2 G/DL (ref 32–37)
MCV RBC AUTO: 92 FL (ref 80–100)
MONOCYTES # BLD: 0.3 K/UL (ref 0–1)
MONOCYTES NFR BLD: 4 %
NEUTROPHILS # BLD AUTO: 6 K/UL (ref 1.8–7.7)
NEUTROPHILS NFR BLD: 78 %
NEUTS BAND NFR BLD: 1 %
OSMOLALITY UR CALC.SUM OF ELEC: 293 MOSM/KG (ref 275–295)
PHOSPHATE SERPL-MCNC: 3.1 MG/DL (ref 2.4–4.7)
PLATELET # BLD AUTO: 197 K/UL (ref 140–400)
PMV BLD AUTO: 9.5 FL (ref 7.4–10.3)
POTASSIUM SERPL-SCNC: 3.2 MMOL/L (ref 3.3–5.1)
POTASSIUM SERPL-SCNC: 3.2 MMOL/L (ref 3.3–5.1)
RBC # BLD AUTO: 3.17 M/UL (ref 3.7–5.4)
SODIUM SERPL-SCNC: 136 MMOL/L (ref 136–144)
TRIGL SERPL-MCNC: 171 MG/DL (ref 1–149)
WBC # BLD AUTO: 7.5 K/UL (ref 4–11)

## 2018-06-29 PROCEDURE — 92526 ORAL FUNCTION THERAPY: CPT

## 2018-06-29 PROCEDURE — 97110 THERAPEUTIC EXERCISES: CPT

## 2018-06-29 PROCEDURE — 85025 COMPLETE CBC W/AUTO DIFF WBC: CPT | Performed by: HOSPITALIST

## 2018-06-29 PROCEDURE — A4216 STERILE WATER/SALINE, 10 ML: HCPCS | Performed by: HOSPITALIST

## 2018-06-29 PROCEDURE — 94640 AIRWAY INHALATION TREATMENT: CPT

## 2018-06-29 PROCEDURE — 85007 BL SMEAR W/DIFF WBC COUNT: CPT | Performed by: HOSPITALIST

## 2018-06-29 PROCEDURE — 82962 GLUCOSE BLOOD TEST: CPT

## 2018-06-29 PROCEDURE — A4216 STERILE WATER/SALINE, 10 ML: HCPCS | Performed by: INTERNAL MEDICINE

## 2018-06-29 PROCEDURE — 80048 BASIC METABOLIC PNL TOTAL CA: CPT | Performed by: HOSPITALIST

## 2018-06-29 PROCEDURE — 83735 ASSAY OF MAGNESIUM: CPT | Performed by: HOSPITALIST

## 2018-06-29 PROCEDURE — C9113 INJ PANTOPRAZOLE SODIUM, VIA: HCPCS | Performed by: HOSPITALIST

## 2018-06-29 PROCEDURE — 84100 ASSAY OF PHOSPHORUS: CPT | Performed by: HOSPITALIST

## 2018-06-29 PROCEDURE — 84478 ASSAY OF TRIGLYCERIDES: CPT | Performed by: HOSPITALIST

## 2018-06-29 PROCEDURE — 85027 COMPLETE CBC AUTOMATED: CPT | Performed by: HOSPITALIST

## 2018-06-29 PROCEDURE — 84132 ASSAY OF SERUM POTASSIUM: CPT | Performed by: HOSPITALIST

## 2018-06-29 RX ORDER — FUROSEMIDE 10 MG/ML
20 INJECTION INTRAMUSCULAR; INTRAVENOUS ONCE
Status: COMPLETED | OUTPATIENT
Start: 2018-06-29 | End: 2018-06-29

## 2018-06-29 RX ORDER — DOCUSATE SODIUM 100 MG/1
100 CAPSULE, LIQUID FILLED ORAL 2 TIMES DAILY
Status: DISCONTINUED | OUTPATIENT
Start: 2018-06-29 | End: 2018-07-01

## 2018-06-29 RX ORDER — POTASSIUM CHLORIDE 14.9 MG/ML
20 INJECTION INTRAVENOUS ONCE
Status: COMPLETED | OUTPATIENT
Start: 2018-06-29 | End: 2018-06-29

## 2018-06-29 NOTE — OCCUPATIONAL THERAPY NOTE
OCCUPATIONAL THERAPY TREATMENT NOTE - INPATIENT        Room Number: 560/560-A       Presenting Problem:  (pancreatitis)    Problem List  Principal Problem:    Acute pancreatitis, unspecified complication status, unspecified pancreatitis type  Active Proble on and taking off regular lower body clothing?: A Lot  -   Bathing (including washing, rinsing, drying)?: A Lot  -   Toileting, which includes using toilet, bedpan or urinal? : A Lot  -   Putting on and taking off regular upper body clothing?: A Little  -

## 2018-06-29 NOTE — PHYSICAL THERAPY NOTE
Attempted treatment speech was with the pt. Will re attempt later if appropriate and schedule permits.

## 2018-06-29 NOTE — SLP NOTE
SPEECH DAILY NOTE - INPATIENT    ASSESSMENT & PLAN   ASSESSMENT      Pt seen upright in chair with current diet of full liquid (late breakfast tray) for monitoring of diet tolerance.  Swallowing precautions/strategies discussed and demonstrated with Pt and Goal #2 The patient/family/caregiver will demonstrate understanding and implementation of aspiration precautions and swallow strategies independently over 3 session(s). Reviewed swallowing precautions/strategies with Pt and family.  Good understanding

## 2018-06-29 NOTE — PROGRESS NOTES
Pulmonary Progress Note      NAME: Chava Moeller - ROOM: [address] [place], [place] [address]A - MRN: [id number] - Age: 80year old - : 1925    Assessment/Plan:  1.  Acute hypoxic respiratory failure - due to aspiration, mild pulm edema and bilateral pleural effusions. Larg distress. HEENT: Normocephalic atraumatic. Lips, mucosa, and tongue normal.  No thrush noted. Lungs: diminished   Chest wall: No tenderness or deformity. Heart: Regular rate and rhythm, normal S1S2, no murmur.    Abdomen: soft, non-tender, non-distende

## 2018-06-29 NOTE — PROGRESS NOTES
[facility]    Progress Note    Faye Ivey Patient Status:  Inpatient    1925 MRN [id number]   Location [facility] 5SW/SE Attending Abhilash Ivey MD   Hosp Day # 12 PCP REMI MCCOLLUM, DO, DO     Subjective:     Sitting up in c protonix for hiatal hernia  Cont observation and medical tx for pancreatitis for present time  No BM since 6/26 - receiving occasional narcotic pain meds PRN, will start Colace  Labs in AM    DVT Prophy: SCDs    Deonte Blanco PA-C  06/29/18  2:38 PM 27  27  27   --    < >  29  29  33*  31   ALKPHO  151*  163*   --    --    --   161*   --    AST  76*  59*   --    --    --   59*   --    ALT  101*  83*   --    --    --   61*   --    BILT  2.3*  1.8*   --    --    --   1.4*   --    TP  6.1  5.9   --    --

## 2018-06-29 NOTE — PROGRESS NOTES
Both of the patient's feet have recovered very nicely from her episode of microembolization. She has only a few very mild remaining pink spots with no bluish discoloration and no signs of any significant skin injury on either foot.  She is not complaining

## 2018-06-29 NOTE — PROGRESS NOTES
[facility] Hospitalist Progress Note     CC: Hospital Follow up    PCP: REMI MCCOLLUM DO,        Assessment/Plan:     Principal Problem:    Acute pancreatitis, unspecified complication status, unspecified pancreatitis type  Active Problems:    Acute pancreatitis improved  - likely delirium due to acute infection, medications, hospitalization    AFib with RVR, now nsr  - post procedure on 6/18, dev Afib with rapid rates  - cont po lopressor, irbesartan on hold  - no anticoag at this time due to age, comorbidities, 8.8 oz (76 kg)  06/18/18 2100 : 161 lb 9.6 oz (73.3 kg)  06/17/18 0124 : 156 lb 8 oz (71 kg)  06/16/18 2024 : 135 lb (61.2 kg)      Exam    Gen: calm  Heent: NC AT, mucous memb clear   Pulm: Lungs with diminished BS,crackles  CV: Heart with regular rate an • potassium chloride  20 mEq Intravenous Once   • ipratropium-albuterol  3 mL Nebulization Q6H WA   • hydrALAzine HCl  25 mg Oral Q8H [facility]   • piperacillin-tazobactam  3.375 g Intravenous Q8H   • Vancomycin HCl  125 mg Oral Daily   • metoprolol Tartrate

## 2018-06-29 NOTE — PROGRESS NOTES
[facility] AND [facility]  Gastroenterology Progress Note    Norman Ramesh Patient Status:  Inpatient    1925 MRN [id number]   Location [facility] 5SW/SE Attending Hema Odell MD   Hosp Day # 12 PCP REMI MCCOLLUM DO, DO     Subjective:   Luna Whitman Sut

## 2018-06-29 NOTE — PLAN OF CARE
Problem: Patient Centered Care  Goal: Patient preferences are identified and integrated in the patient's plan of care  Interventions:  - What would you like us to know as we care for you?  Lives at home with Son and family  - Provide timely, complete, and a stated pain goal  INTERVENTIONS:  - Encourage pt to monitor pain and request assistance  - Assess pain using appropriate pain scale  - Administer analgesics based on type and severity of pain and evaluate response  - Implement non-pharmacological measures

## 2018-06-30 LAB
ALBUMIN SERPL BCP-MCNC: 2.1 G/DL (ref 3.5–4.8)
ALP SERPL-CCNC: 188 U/L (ref 32–100)
ALT SERPL-CCNC: 69 U/L (ref 14–54)
ANION GAP SERPL CALC-SCNC: 6 MMOL/L (ref 0–18)
AST SERPL-CCNC: 81 U/L (ref 15–41)
BASOPHILS # BLD: 0 K/UL (ref 0–0.2)
BASOPHILS NFR BLD: 0 %
BILIRUB DIRECT SERPL-MCNC: 1.5 MG/DL (ref 0–0.2)
BILIRUB SERPL-MCNC: 2.4 MG/DL (ref 0.3–1.2)
BUN SERPL-MCNC: 36 MG/DL (ref 8–20)
BUN/CREAT SERPL: 31.6 (ref 10–20)
CALCIUM SERPL-MCNC: 8.2 MG/DL (ref 8.5–10.5)
CHLORIDE SERPL-SCNC: 101 MMOL/L (ref 95–110)
CO2 SERPL-SCNC: 30 MMOL/L (ref 22–32)
CREAT SERPL-MCNC: 1.14 MG/DL (ref 0.5–1.5)
EOSINOPHIL # BLD: 0 K/UL (ref 0–0.7)
EOSINOPHIL NFR BLD: 0 %
ERYTHROCYTE [DISTWIDTH] IN BLOOD BY AUTOMATED COUNT: 15.4 % (ref 11–15)
GLUCOSE BLDC GLUCOMTR-MCNC: 173 MG/DL (ref 70–99)
GLUCOSE BLDC GLUCOMTR-MCNC: 179 MG/DL (ref 70–99)
GLUCOSE BLDC GLUCOMTR-MCNC: 192 MG/DL (ref 70–99)
GLUCOSE BLDC GLUCOMTR-MCNC: 99 MG/DL (ref 70–99)
GLUCOSE SERPL-MCNC: 132 MG/DL (ref 70–99)
HCT VFR BLD AUTO: 29 % (ref 35–48)
HGB BLD-MCNC: 9.8 G/DL (ref 12–16)
LYMPHOCYTES # BLD: 0.2 K/UL (ref 1–4)
LYMPHOCYTES NFR BLD: 3 %
MAGNESIUM SERPL-MCNC: 2.2 MG/DL (ref 1.8–2.5)
MCH RBC QN AUTO: 31 PG (ref 27–32)
MCHC RBC AUTO-ENTMCNC: 33.6 G/DL (ref 32–37)
MCV RBC AUTO: 92.2 FL (ref 80–100)
MONOCYTES # BLD: 0.4 K/UL (ref 0–1)
MONOCYTES NFR BLD: 5 %
NEUTROPHILS # BLD AUTO: 6.5 K/UL (ref 1.8–7.7)
NEUTROPHILS NFR BLD: 87 %
NEUTS BAND NFR BLD: 5 %
OSMOLALITY UR CALC.SUM OF ELEC: 294 MOSM/KG (ref 275–295)
PHOSPHATE SERPL-MCNC: 3.6 MG/DL (ref 2.4–4.7)
PLATELET # BLD AUTO: 203 K/UL (ref 140–400)
PMV BLD AUTO: 9.7 FL (ref 7.4–10.3)
POTASSIUM SERPL-SCNC: 4.1 MMOL/L (ref 3.3–5.1)
POTASSIUM SERPL-SCNC: 4.1 MMOL/L (ref 3.3–5.1)
PROT SERPL-MCNC: 5.8 G/DL (ref 5.9–8.4)
RBC # BLD AUTO: 3.15 M/UL (ref 3.7–5.4)
SODIUM SERPL-SCNC: 137 MMOL/L (ref 136–144)
WBC # BLD AUTO: 7 K/UL (ref 4–11)

## 2018-06-30 PROCEDURE — A4216 STERILE WATER/SALINE, 10 ML: HCPCS | Performed by: HOSPITALIST

## 2018-06-30 PROCEDURE — 82962 GLUCOSE BLOOD TEST: CPT

## 2018-06-30 PROCEDURE — 97116 GAIT TRAINING THERAPY: CPT

## 2018-06-30 PROCEDURE — 80076 HEPATIC FUNCTION PANEL: CPT | Performed by: HOSPITALIST

## 2018-06-30 PROCEDURE — 85007 BL SMEAR W/DIFF WBC COUNT: CPT | Performed by: HOSPITALIST

## 2018-06-30 PROCEDURE — A4216 STERILE WATER/SALINE, 10 ML: HCPCS | Performed by: INTERNAL MEDICINE

## 2018-06-30 PROCEDURE — 84100 ASSAY OF PHOSPHORUS: CPT | Performed by: HOSPITALIST

## 2018-06-30 PROCEDURE — 97530 THERAPEUTIC ACTIVITIES: CPT

## 2018-06-30 PROCEDURE — 84132 ASSAY OF SERUM POTASSIUM: CPT | Performed by: HOSPITALIST

## 2018-06-30 PROCEDURE — 80048 BASIC METABOLIC PNL TOTAL CA: CPT | Performed by: HOSPITALIST

## 2018-06-30 PROCEDURE — 83735 ASSAY OF MAGNESIUM: CPT | Performed by: HOSPITALIST

## 2018-06-30 PROCEDURE — 85027 COMPLETE CBC AUTOMATED: CPT | Performed by: HOSPITALIST

## 2018-06-30 PROCEDURE — C9113 INJ PANTOPRAZOLE SODIUM, VIA: HCPCS | Performed by: HOSPITALIST

## 2018-06-30 PROCEDURE — 36569 INSJ PICC 5 YR+ W/O IMAGING: CPT

## 2018-06-30 PROCEDURE — 94640 AIRWAY INHALATION TREATMENT: CPT

## 2018-06-30 PROCEDURE — 76937 US GUIDE VASCULAR ACCESS: CPT

## 2018-06-30 PROCEDURE — 36415 COLL VENOUS BLD VENIPUNCTURE: CPT

## 2018-06-30 PROCEDURE — 85025 COMPLETE CBC W/AUTO DIFF WBC: CPT | Performed by: HOSPITALIST

## 2018-06-30 RX ORDER — LIDOCAINE HYDROCHLORIDE 10 MG/ML
0.5 INJECTION, SOLUTION INFILTRATION; PERINEURAL ONCE AS NEEDED
Status: ACTIVE | OUTPATIENT
Start: 2018-06-30 | End: 2018-06-30

## 2018-06-30 RX ORDER — SODIUM CHLORIDE 9 MG/ML
INJECTION, SOLUTION INTRAVENOUS
Status: COMPLETED
Start: 2018-06-30 | End: 2018-06-30

## 2018-06-30 RX ORDER — SODIUM CHLORIDE 0.9 % (FLUSH) 0.9 %
10 SYRINGE (ML) INJECTION AS NEEDED
Status: DISCONTINUED | OUTPATIENT
Start: 2018-06-30 | End: 2018-06-30

## 2018-06-30 NOTE — PHYSICAL THERAPY NOTE
PHYSICAL THERAPY TREATMENT NOTE - INPATIENT     Room Number: 114/532-I       Presenting Problem: acute pancreatis s/p ERCP 6/18    Problem List  Principal Problem:    Acute pancreatitis, unspecified complication status, unspecified pancreatitis type  Activ Static Sitting: Good  Dynamic Sitting: Fair +           Static Standing: Fair -  Dynamic Standing: Poor +    ACTIVITY TOLERANCE  Room air    AM-PAC '6-Clicks' INPATIENT SHORT FORM - BASIC MOBILITY  How much difficulty does the patient currently have. Ann Finn Goal #5 Patient to demonstrate activity/exercise instructions provided to patient in preparation for discharge, with supervision.     Goal #5   Current Status In progress   Goal #6     Goal #6  Current Status

## 2018-06-30 NOTE — PROGRESS NOTES
Pulmonary Progress Note        NAME: Fabrizio Dalal - ROOM: 560/560-A - MRN: [id number] - Age: 80year old - : 1925    Past Medical History:   Diagnosis Date   • Hiatal hernia    • High blood pressure    • Hyperlipidemia          SUBJECTIVE: No n 29.2*  29.0*   MCV  91.3  92.0  92.2   MCH  30.8  30.5  31.0   MCHC  33.8  33.2  33.6   RDW  14.9  15.1*  15.4*   WBC  8.0  7.5  7.0   PLT  205  197  203         Recent Labs   Lab  06/28/18   0535  06/29/18   0520  06/30/18   0525  06/30/18   0634   GLU  1

## 2018-06-30 NOTE — PROGRESS NOTES
[facility] Hospitalist Progress Note     CC: Hospital Follow up    PCP: REMI MCCOLLUM DO,        Assessment/Plan:     Principal Problem:    Acute pancreatitis, unspecified complication status, unspecified pancreatitis type  Active Problems:    Acute pancreatitis Encephalopathy- improved   - likely multifactorial, mental status improved  - likely delirium due to acute infection, medications, hospitalization    AFib with RVR, now nsr  - post procedure on 6/18, dev Afib with rapid rates  - cont po lopressor, irbe : 168 lb 8 oz (76.4 kg)  06/22/18 0554 : 171 lb 11.2 oz (77.9 kg)  06/22/18 0348 : 173 lb 11.6 oz (78.8 kg)  06/20/18 0550 : 173 lb 15.1 oz (78.9 kg)  06/19/18 0556 : 167 lb 8.8 oz (76 kg)  06/18/18 2100 : 161 lb 9.6 oz (73.3 kg)  06/17/18 0124 : 156 lb 8 QID   • lidocaine  1 patch Transdermal Daily   • Insulin Regular Human  1-5 Units Subcutaneous 4 times per day   • Heparin Sodium (Porcine)  5,000 Units Subcutaneous Q8H   • pantoprazole (PROTONIX) IV push  40 mg Intravenous Daily     • adult 3 in 1 TPN

## 2018-06-30 NOTE — RESPIRATORY THERAPY NOTE
Patient refused **bipap therapy. [facility] has educated the patient on the purpose of and need for this therapy as well as potential negative outcomes associated with deferring treatment.  Despite education, patient maintains refusal.

## 2018-07-01 ENCOUNTER — APPOINTMENT (OUTPATIENT)
Dept: GENERAL RADIOLOGY | Facility: HOSPITAL | Age: 83
DRG: 438 | End: 2018-07-01
Attending: HOSPITALIST
Payer: MEDICARE

## 2018-07-01 LAB
ALBUMIN SERPL BCP-MCNC: 2.1 G/DL (ref 3.5–4.8)
ALP SERPL-CCNC: 185 U/L (ref 32–100)
ALT SERPL-CCNC: 59 U/L (ref 14–54)
ANION GAP SERPL CALC-SCNC: 6 MMOL/L (ref 0–18)
AST SERPL-CCNC: 56 U/L (ref 15–41)
BASOPHILS # BLD: 0 K/UL (ref 0–0.2)
BASOPHILS NFR BLD: 0 %
BILIRUB DIRECT SERPL-MCNC: 0.5 MG/DL (ref 0–0.2)
BILIRUB SERPL-MCNC: 1.2 MG/DL (ref 0.3–1.2)
BUN SERPL-MCNC: 38 MG/DL (ref 8–20)
BUN/CREAT SERPL: 35.8 (ref 10–20)
CALCIUM SERPL-MCNC: 8 MG/DL (ref 8.5–10.5)
CHLORIDE SERPL-SCNC: 101 MMOL/L (ref 95–110)
CO2 SERPL-SCNC: 30 MMOL/L (ref 22–32)
CREAT SERPL-MCNC: 1.06 MG/DL (ref 0.5–1.5)
EOSINOPHIL # BLD: 0.2 K/UL (ref 0–0.7)
EOSINOPHIL NFR BLD: 3 %
ERYTHROCYTE [DISTWIDTH] IN BLOOD BY AUTOMATED COUNT: 15.6 % (ref 11–15)
GLUCOSE BLDC GLUCOMTR-MCNC: 151 MG/DL (ref 70–99)
GLUCOSE BLDC GLUCOMTR-MCNC: 159 MG/DL (ref 70–99)
GLUCOSE BLDC GLUCOMTR-MCNC: 229 MG/DL (ref 70–99)
GLUCOSE BLDC GLUCOMTR-MCNC: 233 MG/DL (ref 70–99)
GLUCOSE SERPL-MCNC: 129 MG/DL (ref 70–99)
HCT VFR BLD AUTO: 26.4 % (ref 35–48)
HCT VFR BLD AUTO: 28.3 % (ref 35–48)
HGB BLD-MCNC: 8.7 G/DL (ref 12–16)
HGB BLD-MCNC: 9.4 G/DL (ref 12–16)
LYMPHOCYTES # BLD: 0.7 K/UL (ref 1–4)
LYMPHOCYTES NFR BLD: 11 %
MAGNESIUM SERPL-MCNC: 2.2 MG/DL (ref 1.8–2.5)
MCH RBC QN AUTO: 30.5 PG (ref 27–32)
MCHC RBC AUTO-ENTMCNC: 32.8 G/DL (ref 32–37)
MCV RBC AUTO: 92.9 FL (ref 80–100)
MONOCYTES # BLD: 0.4 K/UL (ref 0–1)
MONOCYTES NFR BLD: 7 %
NEUTROPHILS # BLD AUTO: 4.8 K/UL (ref 1.8–7.7)
NEUTROPHILS NFR BLD: 79 %
OSMOLALITY UR CALC.SUM OF ELEC: 295 MOSM/KG (ref 275–295)
PHOSPHATE SERPL-MCNC: 2.7 MG/DL (ref 2.4–4.7)
PLATELET # BLD AUTO: 227 K/UL (ref 140–400)
PMV BLD AUTO: 9.9 FL (ref 7.4–10.3)
POTASSIUM SERPL-SCNC: 3.9 MMOL/L (ref 3.3–5.1)
PROT SERPL-MCNC: 6 G/DL (ref 5.9–8.4)
RBC # BLD AUTO: 2.84 M/UL (ref 3.7–5.4)
SODIUM SERPL-SCNC: 137 MMOL/L (ref 136–144)
WBC # BLD AUTO: 6.1 K/UL (ref 4–11)

## 2018-07-01 PROCEDURE — 71045 X-RAY EXAM CHEST 1 VIEW: CPT | Performed by: HOSPITALIST

## 2018-07-01 PROCEDURE — 80048 BASIC METABOLIC PNL TOTAL CA: CPT | Performed by: HOSPITALIST

## 2018-07-01 PROCEDURE — 82962 GLUCOSE BLOOD TEST: CPT

## 2018-07-01 PROCEDURE — 84100 ASSAY OF PHOSPHORUS: CPT | Performed by: HOSPITALIST

## 2018-07-01 PROCEDURE — 85014 HEMATOCRIT: CPT | Performed by: HOSPITALIST

## 2018-07-01 PROCEDURE — 80076 HEPATIC FUNCTION PANEL: CPT | Performed by: HOSPITALIST

## 2018-07-01 PROCEDURE — A4216 STERILE WATER/SALINE, 10 ML: HCPCS | Performed by: HOSPITALIST

## 2018-07-01 PROCEDURE — A4216 STERILE WATER/SALINE, 10 ML: HCPCS

## 2018-07-01 PROCEDURE — 85025 COMPLETE CBC W/AUTO DIFF WBC: CPT | Performed by: HOSPITALIST

## 2018-07-01 PROCEDURE — 85018 HEMOGLOBIN: CPT | Performed by: HOSPITALIST

## 2018-07-01 PROCEDURE — 97116 GAIT TRAINING THERAPY: CPT

## 2018-07-01 PROCEDURE — 97530 THERAPEUTIC ACTIVITIES: CPT

## 2018-07-01 PROCEDURE — C9113 INJ PANTOPRAZOLE SODIUM, VIA: HCPCS | Performed by: HOSPITALIST

## 2018-07-01 PROCEDURE — A4216 STERILE WATER/SALINE, 10 ML: HCPCS | Performed by: INTERNAL MEDICINE

## 2018-07-01 PROCEDURE — 83735 ASSAY OF MAGNESIUM: CPT | Performed by: HOSPITALIST

## 2018-07-01 PROCEDURE — 94640 AIRWAY INHALATION TREATMENT: CPT

## 2018-07-01 RX ORDER — TRAMADOL HYDROCHLORIDE 50 MG/1
50 TABLET ORAL EVERY 6 HOURS PRN
Status: DISCONTINUED | OUTPATIENT
Start: 2018-07-01 | End: 2018-07-02

## 2018-07-01 RX ORDER — FUROSEMIDE 10 MG/ML
20 INJECTION INTRAMUSCULAR; INTRAVENOUS ONCE
Status: COMPLETED | OUTPATIENT
Start: 2018-07-01 | End: 2018-07-01

## 2018-07-01 RX ORDER — 0.9 % SODIUM CHLORIDE 0.9 %
VIAL (ML) INJECTION
Status: COMPLETED
Start: 2018-07-01 | End: 2018-07-01

## 2018-07-01 NOTE — PHYSICAL THERAPY NOTE
PHYSICAL THERAPY TREATMENT NOTE - INPATIENT     Room Number: 560/560-A       Presenting Problem: acute pancreatis s/p ERCP 6/18    Problem List  Principal Problem:    Acute pancreatitis, unspecified complication status, unspecified pancreatitis type  Activ the bed?: A Lot   How much help from another person does the patient currently need. ..   -   Moving to and from a bed to a chair (including a wheelchair)?: A Lot   -   Need to walk in hospital room?: A Lot   -   Climbing 3-5 steps with a railing?: Total progress   Goal #6     Goal #6  Current Status

## 2018-07-01 NOTE — PROGRESS NOTES
Pulmonary Progress Note        NAME: Bonny Ortez - ROOM: 560560-A - MRN: [id number] - Age: 80year old - : 1925    Past Medical History:   Diagnosis Date   • Hiatal hernia    • High blood pressure    • Hyperlipidemia          SUBJECTIVE: No n 29.2*  29.0*  26.4*   MCV  92.0  92.2  92.9   MCH  30.5  31.0  30.5   MCHC  33.2  33.6  32.8   RDW  15.1*  15.4*  15.6*   WBC  7.5  7.0  6.1   PLT  197  203  227         Recent Labs   Lab  06/29/18   0520  06/30/18   0525  06/30/18   0634  07/01/18   0500

## 2018-07-01 NOTE — PLAN OF CARE
GASTROINTESTINAL - ADULT    • Minimal or absence of nausea and vomiting Progressing    • Maintains or returns to baseline bowel function Progressing    lacie full liquids- advanced to mech soft- Percutaneous drain to RLQ      NEUROLOGICAL - ADULT    • Achiev

## 2018-07-01 NOTE — PROGRESS NOTES
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  -non painful, dp pulses intact b/l  -no intervention needed    YOUNG    - cr improved    Hyperkalemia  - resolved w kayexalate and lasix, replete prn     Encephalopathy- improved   - likely multifactorial, mental status improved  - li kg)  06/30/18 0536 : 166 lb (75.3 kg)  06/29/18 0627 : 163 lb (73.9 kg)  06/28/18 0600 : 163 lb 6.4 oz (74.1 kg)  06/27/18 0627 : 162 lb 4.8 oz (73.6 kg)  06/26/18 0444 : 169 lb 6.4 oz (76.8 kg)  06/25/18 0443 : 169 lb (76.7 kg)  06/24/18 0358 : 166 lb 8 o Imaging:          Meds:     • furosemide  20 mg Intravenous Once   • ipratropium-albuterol  3 mL Nebulization Q6H WA   • hydrALAzine HCl  25 mg Oral Q8H [address]   • piperacillin-tazobactam  3.375 g Intravenous Q8H   • Vancomycin HCl  125 mg Oral Daily

## 2018-07-02 ENCOUNTER — APPOINTMENT (OUTPATIENT)
Dept: ULTRASOUND IMAGING | Facility: HOSPITAL | Age: 83
DRG: 438 | End: 2018-07-02
Attending: CLINICAL NURSE SPECIALIST
Payer: MEDICARE

## 2018-07-02 LAB
ALBUMIN SERPL BCP-MCNC: 2.2 G/DL (ref 3.5–4.8)
ALBUMIN SERPL BCP-MCNC: 2.2 G/DL (ref 3.5–4.8)
ALP SERPL-CCNC: 163 U/L (ref 32–100)
ALP SERPL-CCNC: 170 U/L (ref 32–100)
ALT SERPL-CCNC: 56 U/L (ref 14–54)
ALT SERPL-CCNC: 58 U/L (ref 14–54)
ANION GAP SERPL CALC-SCNC: 8 MMOL/L (ref 0–18)
AST SERPL-CCNC: 57 U/L (ref 15–41)
AST SERPL-CCNC: 62 U/L (ref 15–41)
BASOPHILS # BLD: 0.1 K/UL (ref 0–0.2)
BASOPHILS NFR BLD: 1 %
BILIRUB DIRECT SERPL-MCNC: 0.4 MG/DL (ref 0–0.2)
BILIRUB DIRECT SERPL-MCNC: 0.7 MG/DL (ref 0–0.2)
BILIRUB SERPL-MCNC: 1.2 MG/DL (ref 0.3–1.2)
BILIRUB SERPL-MCNC: 1.4 MG/DL (ref 0.3–1.2)
BUN SERPL-MCNC: 34 MG/DL (ref 8–20)
BUN/CREAT SERPL: 32.4 (ref 10–20)
CALCIUM SERPL-MCNC: 8.3 MG/DL (ref 8.5–10.5)
CHLORIDE SERPL-SCNC: 102 MMOL/L (ref 95–110)
CO2 SERPL-SCNC: 31 MMOL/L (ref 22–32)
CREAT SERPL-MCNC: 1.05 MG/DL (ref 0.5–1.5)
EOSINOPHIL # BLD: 0.1 K/UL (ref 0–0.7)
EOSINOPHIL NFR BLD: 2 %
ERYTHROCYTE [DISTWIDTH] IN BLOOD BY AUTOMATED COUNT: 15.8 % (ref 11–15)
GLUCOSE BLDC GLUCOMTR-MCNC: 106 MG/DL (ref 70–99)
GLUCOSE BLDC GLUCOMTR-MCNC: 137 MG/DL (ref 70–99)
GLUCOSE SERPL-MCNC: 120 MG/DL (ref 70–99)
HCT VFR BLD AUTO: 26.2 % (ref 35–48)
HCT VFR BLD AUTO: 27.2 % (ref 35–48)
HGB BLD-MCNC: 8.7 G/DL (ref 12–16)
HGB BLD-MCNC: 9 G/DL (ref 12–16)
LYMPHOCYTES # BLD: 1 K/UL (ref 1–4)
LYMPHOCYTES NFR BLD: 14 %
MCH RBC QN AUTO: 30.6 PG (ref 27–32)
MCHC RBC AUTO-ENTMCNC: 33.3 G/DL (ref 32–37)
MCV RBC AUTO: 91.7 FL (ref 80–100)
MONOCYTES # BLD: 0.5 K/UL (ref 0–1)
MONOCYTES NFR BLD: 8 %
NEUTROPHILS # BLD AUTO: 5.5 K/UL (ref 1.8–7.7)
NEUTROPHILS NFR BLD: 76 %
OSMOLALITY UR CALC.SUM OF ELEC: 301 MOSM/KG (ref 275–295)
PLATELET # BLD AUTO: 265 K/UL (ref 140–400)
PMV BLD AUTO: 10.2 FL (ref 7.4–10.3)
POTASSIUM SERPL-SCNC: 3.4 MMOL/L (ref 3.3–5.1)
PROT SERPL-MCNC: 6.2 G/DL (ref 5.9–8.4)
PROT SERPL-MCNC: 6.2 G/DL (ref 5.9–8.4)
RBC # BLD AUTO: 2.85 M/UL (ref 3.7–5.4)
SODIUM SERPL-SCNC: 141 MMOL/L (ref 136–144)
WBC # BLD AUTO: 7.2 K/UL (ref 4–11)

## 2018-07-02 PROCEDURE — 80076 HEPATIC FUNCTION PANEL: CPT | Performed by: HOSPITALIST

## 2018-07-02 PROCEDURE — 97116 GAIT TRAINING THERAPY: CPT

## 2018-07-02 PROCEDURE — 97110 THERAPEUTIC EXERCISES: CPT

## 2018-07-02 PROCEDURE — 76705 ECHO EXAM OF ABDOMEN: CPT | Performed by: CLINICAL NURSE SPECIALIST

## 2018-07-02 PROCEDURE — A4216 STERILE WATER/SALINE, 10 ML: HCPCS | Performed by: INTERNAL MEDICINE

## 2018-07-02 PROCEDURE — 97530 THERAPEUTIC ACTIVITIES: CPT

## 2018-07-02 PROCEDURE — 85018 HEMOGLOBIN: CPT | Performed by: INTERNAL MEDICINE

## 2018-07-02 PROCEDURE — 82962 GLUCOSE BLOOD TEST: CPT

## 2018-07-02 PROCEDURE — 80048 BASIC METABOLIC PNL TOTAL CA: CPT | Performed by: HOSPITALIST

## 2018-07-02 PROCEDURE — 85025 COMPLETE CBC W/AUTO DIFF WBC: CPT | Performed by: HOSPITALIST

## 2018-07-02 PROCEDURE — 85014 HEMATOCRIT: CPT | Performed by: INTERNAL MEDICINE

## 2018-07-02 PROCEDURE — A4216 STERILE WATER/SALINE, 10 ML: HCPCS | Performed by: HOSPITALIST

## 2018-07-02 PROCEDURE — 36592 COLLECT BLOOD FROM PICC: CPT

## 2018-07-02 PROCEDURE — C9113 INJ PANTOPRAZOLE SODIUM, VIA: HCPCS | Performed by: HOSPITALIST

## 2018-07-02 PROCEDURE — 94640 AIRWAY INHALATION TREATMENT: CPT

## 2018-07-02 RX ORDER — MELATONIN
325 2 TIMES DAILY WITH MEALS
Status: DISCONTINUED | OUTPATIENT
Start: 2018-07-02 | End: 2018-07-10

## 2018-07-02 RX ORDER — TRAMADOL HYDROCHLORIDE 50 MG/1
50 TABLET ORAL EVERY 12 HOURS PRN
Status: DISCONTINUED | OUTPATIENT
Start: 2018-07-02 | End: 2018-07-10

## 2018-07-02 RX ORDER — POTASSIUM CHLORIDE 29.8 MG/ML
40 INJECTION INTRAVENOUS ONCE
Status: COMPLETED | OUTPATIENT
Start: 2018-07-02 | End: 2018-07-02

## 2018-07-02 RX ORDER — FUROSEMIDE 20 MG/1
20 TABLET ORAL DAILY
Status: DISCONTINUED | OUTPATIENT
Start: 2018-07-02 | End: 2018-07-10

## 2018-07-02 NOTE — PROGRESS NOTES
[facility] AND [facility]  Gastroenterology Progress Note    Payal Montes De Oca Patient Status:  Inpatient    1925 MRN [id number]   Location [facility] 5SW/SE Attending Graciela Willard MD   Hosp Day # 15 PCP REMI MCCOLLUM, , DO     Subjective:   Aram Ortiz Sut type     Pancreatitis      Assessment/Plan:  Choledocholithiasis, status post ERCP sphincterotomy on 6/18. Cholecystitis status post cholecystostomy tube placement 6/27. 4+ WBCs but cultures negative.   Acute on chronic respiratory failure with aspiration

## 2018-07-02 NOTE — PHYSICAL THERAPY NOTE
PHYSICAL THERAPY TREATMENT NOTE - INPATIENT     Room Number: 560/560-A       Presenting Problem: acute pancreatis s/p ERCP 6/18    Problem List  Principal Problem:    Acute pancreatitis, unspecified complication status, unspecified pancreatitis type  Activ from a bed to a chair (including a wheelchair)?: A Lot   -   Need to walk in hospital room?: A Lot   -   Climbing 3-5 steps with a railing?: Total     AM-PAC Score:  Raw Score: 12   PT Approx Degree of Impairment Score: 68.66%   Standardized Score (AM-PAC

## 2018-07-02 NOTE — PROGRESS NOTES
[facility] Hospitalist Progress Note     CC: Hospital Follow up    PCP: REMI MCCOLLUM DO,        Assessment/Plan:     Principal Problem:    Acute pancreatitis, unspecified complication status, unspecified pancreatitis type  Active Problems:    Acute pancreatitis melena this am  - hgb 8.7 this am repeat today  - trend and monitor    Toe discoloration/blue toe syndrome  - seen by vascular surgery, suspect microemboli from aorta  -non painful, dp pulses intact b/l  -no intervention needed    YOUNG    - cr improved    H Output               10 ml   Net              790 ml       Last 3 Weights  07/02/18 0600 : 167 lb 12.8 oz (76.1 kg)  07/01/18 0600 : 168 lb 9.6 oz (76.5 kg)  06/30/18 0536 : 166 lb (75.3 kg)  06/29/18 0627 : 163 lb (73.9 kg)  06/28/18 0600 : 163 lb 6.4 o 5148  06/28/18   0535  06/30/18   0525  06/30/18   0634  07/01/18   0500   ALT   --   61*   --   69*  59*   AST   --   59*   --   81*  56*   ALB  2.3*  2.0*  2.1*   --   2.1*         Imaging:  Xr Chest Ap Portable  (cpt=71045)    Result Date: 7/1/2018  CON

## 2018-07-02 NOTE — PROGRESS NOTES
Pulmonary Progress Note     Assessment / Plan:  1.  Acute hypoxic respiratory failure - due to aspiration, mild pulm edema and bilateral pleural effusions. Large hiatal hernia that is likely contributing to recurrent aspiration  - IS and ambulate   - bd pro

## 2018-07-02 NOTE — PROGRESS NOTES
[facility]  Progress Note    Payal Montes De Oca Patient Status:  Inpatient    1925 MRN [id number]   Location [facility] 5SW/SE Attending Graciela Willard MD   [facility] Day # 15 PCP REMI MCCOLLUM DO, DO       Subjective:   Sleeping    Objec

## 2018-07-02 NOTE — PROGRESS NOTES
[facility] Note:  Renal Dose Adjustment for Tramadol Ayaka Alexander    Eldon King has been prescribed Tramadol (ULTRAM) 50 mg orally every 6 hours as needed for pain. Estimated Creatinine Clearance: 27.7 mL/min (based on SCr of 1.05 mg/dL).     Her calc

## 2018-07-02 NOTE — OCCUPATIONAL THERAPY NOTE
OCCUPATIONAL THERAPY TREATMENT NOTE - INPATIENT        Room Number: 560/560-A           Presenting Problem:  (pancreatitis)    Problem List  Principal Problem:    Acute pancreatitis, unspecified complication status, unspecified pancreatitis type  Active Pr toilet, bedpan or urinal? : A Lot  -   Putting on and taking off regular upper body clothing?: A Little  -   Taking care of personal grooming such as brushing teeth?: A Little  -   Eating meals?: None    AM-PAC Score:  Score: 16  Approx Degree of Impairmen

## 2018-07-03 LAB
ALBUMIN SERPL BCP-MCNC: 2.1 G/DL (ref 3.5–4.8)
ALBUMIN/GLOB SERPL: 0.6 {RATIO} (ref 1–2)
ALP SERPL-CCNC: 153 U/L (ref 32–100)
ALT SERPL-CCNC: 60 U/L (ref 14–54)
ANION GAP SERPL CALC-SCNC: 8 MMOL/L (ref 0–18)
AST SERPL-CCNC: 55 U/L (ref 15–41)
BASOPHILS # BLD: 0.1 K/UL (ref 0–0.2)
BASOPHILS NFR BLD: 1 %
BILIRUB DIRECT SERPL-MCNC: 0.5 MG/DL (ref 0–0.2)
BILIRUB SERPL-MCNC: 1.2 MG/DL (ref 0.3–1.2)
BUN SERPL-MCNC: 28 MG/DL (ref 8–20)
BUN/CREAT SERPL: 24.1 (ref 10–20)
CALCIUM SERPL-MCNC: 8.4 MG/DL (ref 8.5–10.5)
CHLORIDE SERPL-SCNC: 102 MMOL/L (ref 95–110)
CO2 SERPL-SCNC: 31 MMOL/L (ref 22–32)
CREAT SERPL-MCNC: 1.16 MG/DL (ref 0.5–1.5)
EOSINOPHIL # BLD: 0.2 K/UL (ref 0–0.7)
EOSINOPHIL NFR BLD: 3 %
ERYTHROCYTE [DISTWIDTH] IN BLOOD BY AUTOMATED COUNT: 15.8 % (ref 11–15)
GLOBULIN PLAS-MCNC: 3.6 G/DL (ref 2.5–3.7)
GLUCOSE SERPL-MCNC: 98 MG/DL (ref 70–99)
HCT VFR BLD AUTO: 24.4 % (ref 35–48)
HGB BLD-MCNC: 8.2 G/DL (ref 12–16)
LYMPHOCYTES # BLD: 0.9 K/UL (ref 1–4)
LYMPHOCYTES NFR BLD: 15 %
MAGNESIUM SERPL-MCNC: 1.5 MG/DL (ref 1.8–2.5)
MCH RBC QN AUTO: 30.7 PG (ref 27–32)
MCHC RBC AUTO-ENTMCNC: 33.4 G/DL (ref 32–37)
MCV RBC AUTO: 91.9 FL (ref 80–100)
MONOCYTES # BLD: 0.4 K/UL (ref 0–1)
MONOCYTES NFR BLD: 7 %
NEUTROPHILS # BLD AUTO: 4.2 K/UL (ref 1.8–7.7)
NEUTROPHILS NFR BLD: 73 %
OSMOLALITY UR CALC.SUM OF ELEC: 297 MOSM/KG (ref 275–295)
PLATELET # BLD AUTO: 255 K/UL (ref 140–400)
PMV BLD AUTO: 9.5 FL (ref 7.4–10.3)
POTASSIUM SERPL-SCNC: 3.5 MMOL/L (ref 3.3–5.1)
PROT SERPL-MCNC: 5.7 G/DL (ref 5.9–8.4)
RBC # BLD AUTO: 2.65 M/UL (ref 3.7–5.4)
SODIUM SERPL-SCNC: 141 MMOL/L (ref 136–144)
WBC # BLD AUTO: 5.7 K/UL (ref 4–11)

## 2018-07-03 PROCEDURE — 94640 AIRWAY INHALATION TREATMENT: CPT

## 2018-07-03 PROCEDURE — 80053 COMPREHEN METABOLIC PANEL: CPT | Performed by: HOSPITALIST

## 2018-07-03 PROCEDURE — 97530 THERAPEUTIC ACTIVITIES: CPT

## 2018-07-03 PROCEDURE — 85025 COMPLETE CBC W/AUTO DIFF WBC: CPT | Performed by: HOSPITALIST

## 2018-07-03 PROCEDURE — C9113 INJ PANTOPRAZOLE SODIUM, VIA: HCPCS | Performed by: HOSPITALIST

## 2018-07-03 PROCEDURE — 36592 COLLECT BLOOD FROM PICC: CPT

## 2018-07-03 PROCEDURE — 97116 GAIT TRAINING THERAPY: CPT

## 2018-07-03 PROCEDURE — A4216 STERILE WATER/SALINE, 10 ML: HCPCS | Performed by: HOSPITALIST

## 2018-07-03 PROCEDURE — 82248 BILIRUBIN DIRECT: CPT | Performed by: HOSPITALIST

## 2018-07-03 PROCEDURE — 97110 THERAPEUTIC EXERCISES: CPT

## 2018-07-03 PROCEDURE — 83735 ASSAY OF MAGNESIUM: CPT | Performed by: HOSPITALIST

## 2018-07-03 RX ORDER — POTASSIUM CHLORIDE 29.8 MG/ML
40 INJECTION INTRAVENOUS ONCE
Status: COMPLETED | OUTPATIENT
Start: 2018-07-03 | End: 2018-07-03

## 2018-07-03 RX ORDER — SODIUM CHLORIDE 9 MG/ML
INJECTION, SOLUTION INTRAVENOUS
Status: COMPLETED
Start: 2018-07-03 | End: 2018-07-03

## 2018-07-03 NOTE — DIETARY NOTE
ADULT NUTRITION REASSESSMENT     Pt is at high nutrition risk. Pt does not meet malnutrition criteria. RECOMMENDATIONS TO MD: None at this time.       NUTRITION DIAGNOSIS/PROBLEM:  Inadequate oral intake related to abdominal pain and diarrhea  as evid nutrition care: collaboration with other providers  - Discharge and transfer of nutrition care to new setting or provider: monitor plans    ADMITTING DIAGNOSIS: Acute pancreatitis, unspecified complication status, unspecified pancreatitis type [K85.90]  S/ Accumulation: lower extremity edema  Non pitting   - Skin Integrity: intact.  - Michael score 15    NUTRITION PRESCRIPTION:  Diet: [facility] Soft/Low fiber (ground)  Oral Supplements: BID  ESTIMATED NUTRITION NEEDS:  Calories: adjusted ~6610-6985 calories/day (2

## 2018-07-03 NOTE — PROGRESS NOTES
[facility]  Gastroenterology Progress Note    Dank Jackson Patient Status:  Inpatient    1925 MRN [id number]   Location [facility] 5SW/SE Attending David Martinez MD   Hosp Day # 16 PCP REMI MCCOLLUM, , DO     Subjective:   Fe Lerma Sut 8.2    -Will continue to trend hgb, monitor for signs of bleeding  -c/w PPI    Sandra Stark MD  7/3/2018  2:09 PM

## 2018-07-03 NOTE — PHYSICAL THERAPY NOTE
PHYSICAL THERAPY TREATMENT NOTE - INPATIENT     Room Number: 560/560-A       Presenting Problem: acute pancreatis s/p ERCP 6/18    Problem List  Principal Problem:    Acute pancreatitis, unspecified complication status, unspecified pancreatitis type  Activ Score (AM-PAC Scale): 40.78   CMS Modifier (G-Code): CK    FUNCTIONAL ABILITY STATUS  Gait Assessment   Gait Assistance: Minimum assistance  Distance (ft): 100  Assistive Device: Rolling walker  Pattern:  (short steps, flexed trunk)  Stoop/Curb Assistance:

## 2018-07-03 NOTE — PLAN OF CARE
Problem: Patient Centered Care  Goal: Patient preferences are identified and integrated in the patient's plan of care  Interventions:  - What would you like us to know as we care for you?  Lives at home with Son and family  - Provide timely, complete, and a vomiting  INTERVENTIONS:  - Maintain adequate hydration with IV or PO as ordered and tolerated  - Nasogastric tube to low intermittent suction as ordered  - Evaluate effectiveness of ordered antiemetic medications  - Provide nonpharmacologic comfort measur safety including physical limitations  - Instruct pt to call for assistance with activity based on assessment  - Modify environment to reduce risk of injury  - Provide assistive devices as appropriate  - Consider OT/PT consult to assist with strengthening/

## 2018-07-03 NOTE — PLAN OF CARE
Patient:  Mark Steel  MRN: [id number]  : 1925  Allergies: No Known Allergies      IR MD/ APN Pre-Procedure Plan  (Inpatients Only)    Date of IR Procedure: 7/3/2018  Procedure to be performed: Cholecystomy tube check    Room Modality: X-Ray  S

## 2018-07-03 NOTE — HOME CARE LIAISON
Met with pt at the bedside. She is agreeable to [facility] services. Brochure and liaison card provided. Any pt questions addressed. Will follow.

## 2018-07-03 NOTE — PROGRESS NOTES
[facility] Hospitalist Progress Note     CC: Hospital Follow up    PCP: REMI MCCOLLUM DO,        Assessment/Plan:     Principal Problem:    Acute pancreatitis, unspecified complication status, unspecified pancreatitis type  Active Problems:    Acute pancreatitis O2 as able    Anemia  - hgb up and down  - some reported melena 7/2  - hgb 8.2 this am repeat in am  - trend and monitor    Toe discoloration/blue toe syndrome  - seen by vascular surgery, suspect microemboli from aorta  -non painful, dp pulses intact b/l 07/03/18 1145  Last data filed at 07/03/18 0700   Gross per 24 hour   Intake              360 ml   Output               15 ml   Net              345 ml       Last 3 Weights  07/03/18 0538 : 162 lb 11.2 oz (73.8 kg)  07/02/18 0600 : 167 lb 12.8 oz (76.1 kg) GFRNAA  46*  46*  41*   CA  8.0*  8.3*  8.4*   NA  137  141  141   K  3.9  3.4  3.5   CL  101  102  102   CO2  30  31  31       Recent Labs   Lab  06/30/18   0525  06/30/18   0634  07/01/18   0500  07/02/18   0515  07/02/18   1316  07/03/18   0542   ALT Normal Saline Flush, ondansetron HCl

## 2018-07-03 NOTE — PROGRESS NOTES
[facility]    Progress Note    Mari Corrales Patient Status:  Inpatient    1925 MRN [id number]   Location [facility] 5SW/SE Attending Michelle Leonard MD   Hosp Day # 16 PCP REMI WILL, DO, DO     Subjective:     Pt feels strong having some chronic mild obstructive symptoms. This seems possible cause for the aspiration.   Patient's been worked up and treated for possible aspiration, though pt states swollowing well now  I had extensive discussion with pt and daughter previously I PM                          Meds:     • piperacillin-tazobactam  3.375 g Intravenous Q8H   • furosemide  20 mg Oral Daily   • ferrous sulfate  325 mg Oral BID with meals   • ipratropium-albuterol  3 mL Nebulization Q6H WA   • hydrALAzine HCl  25 mg Oral Q8

## 2018-07-03 NOTE — PROGRESS NOTES
Pulmonary Progress Note      NAME: Ange Pandya - [facility] - MRN: [id number] - Age: 80year old - : 1925    Assessment/Plan:  1.  Acute hypoxic respiratory failure - due to aspiration, mild pulm edema and bilateral pleural effusions. Larg deformity. Heart: Regular rate and rhythm, normal S1S2, II/VI murmur. Abdomen: soft, non-tender, non-distended, positive BS.    Extremity: no edema    Recent Labs   Lab  07/03/18   0542   RBC  2.65*   HGB  8.2*   HCT  24.4*   MCV  91.9   MCH  30.7   MCH

## 2018-07-04 LAB
ANION GAP SERPL CALC-SCNC: 8 MMOL/L (ref 0–18)
BASOPHILS # BLD: 0 K/UL (ref 0–0.2)
BASOPHILS NFR BLD: 0 %
BUN SERPL-MCNC: 25 MG/DL (ref 8–20)
BUN/CREAT SERPL: 20.8 (ref 10–20)
CALCIUM SERPL-MCNC: 8.2 MG/DL (ref 8.5–10.5)
CHLORIDE SERPL-SCNC: 100 MMOL/L (ref 95–110)
CO2 SERPL-SCNC: 29 MMOL/L (ref 22–32)
CREAT SERPL-MCNC: 1.2 MG/DL (ref 0.5–1.5)
EOSINOPHIL # BLD: 0.2 K/UL (ref 0–0.7)
EOSINOPHIL NFR BLD: 4 %
ERYTHROCYTE [DISTWIDTH] IN BLOOD BY AUTOMATED COUNT: 15.5 % (ref 11–15)
GLUCOSE SERPL-MCNC: 104 MG/DL (ref 70–99)
HCT VFR BLD AUTO: 23.5 % (ref 35–48)
HGB BLD-MCNC: 7.9 G/DL (ref 12–16)
LYMPHOCYTES # BLD: 0.8 K/UL (ref 1–4)
LYMPHOCYTES NFR BLD: 15 %
MAGNESIUM SERPL-MCNC: 2.2 MG/DL (ref 1.8–2.5)
MCH RBC QN AUTO: 30.9 PG (ref 27–32)
MCHC RBC AUTO-ENTMCNC: 33.5 G/DL (ref 32–37)
MCV RBC AUTO: 92.3 FL (ref 80–100)
MONOCYTES # BLD: 0.4 K/UL (ref 0–1)
MONOCYTES NFR BLD: 8 %
NEUTROPHILS # BLD AUTO: 3.7 K/UL (ref 1.8–7.7)
NEUTROPHILS NFR BLD: 72 %
OSMOLALITY UR CALC.SUM OF ELEC: 289 MOSM/KG (ref 275–295)
PLATELET # BLD AUTO: 269 K/UL (ref 140–400)
PMV BLD AUTO: 9.5 FL (ref 7.4–10.3)
POTASSIUM SERPL-SCNC: 3.6 MMOL/L (ref 3.3–5.1)
POTASSIUM SERPL-SCNC: 3.6 MMOL/L (ref 3.3–5.1)
POTASSIUM SERPL-SCNC: 4 MMOL/L (ref 3.3–5.1)
RBC # BLD AUTO: 2.54 M/UL (ref 3.7–5.4)
SODIUM SERPL-SCNC: 137 MMOL/L (ref 136–144)
WBC # BLD AUTO: 5.2 K/UL (ref 4–11)

## 2018-07-04 PROCEDURE — 80048 BASIC METABOLIC PNL TOTAL CA: CPT | Performed by: HOSPITALIST

## 2018-07-04 PROCEDURE — 94640 AIRWAY INHALATION TREATMENT: CPT

## 2018-07-04 PROCEDURE — 83735 ASSAY OF MAGNESIUM: CPT | Performed by: HOSPITALIST

## 2018-07-04 PROCEDURE — 84132 ASSAY OF SERUM POTASSIUM: CPT | Performed by: HOSPITALIST

## 2018-07-04 PROCEDURE — C9113 INJ PANTOPRAZOLE SODIUM, VIA: HCPCS | Performed by: HOSPITALIST

## 2018-07-04 PROCEDURE — 85025 COMPLETE CBC W/AUTO DIFF WBC: CPT | Performed by: HOSPITALIST

## 2018-07-04 PROCEDURE — A4216 STERILE WATER/SALINE, 10 ML: HCPCS | Performed by: HOSPITALIST

## 2018-07-04 PROCEDURE — A4216 STERILE WATER/SALINE, 10 ML: HCPCS | Performed by: INTERNAL MEDICINE

## 2018-07-04 RX ORDER — ZOLPIDEM TARTRATE 5 MG/1
5 TABLET ORAL NIGHTLY PRN
Status: DISCONTINUED | OUTPATIENT
Start: 2018-07-04 | End: 2018-07-05

## 2018-07-04 RX ORDER — IPRATROPIUM BROMIDE AND ALBUTEROL SULFATE 2.5; .5 MG/3ML; MG/3ML
3 SOLUTION RESPIRATORY (INHALATION)
Status: DISCONTINUED | OUTPATIENT
Start: 2018-07-04 | End: 2018-07-10

## 2018-07-04 RX ORDER — POTASSIUM CHLORIDE 20 MEQ/1
40 TABLET, EXTENDED RELEASE ORAL EVERY 4 HOURS
Status: COMPLETED | OUTPATIENT
Start: 2018-07-04 | End: 2018-07-04

## 2018-07-04 NOTE — PROGRESS NOTES
[facility] Hospitalist Progress Note     CC: Hospital Follow up    PCP: REMI MCCOLLUM DO,        Assessment/Plan:     Principal Problem:    Acute pancreatitis, unspecified complication status, unspecified pancreatitis type  Active Problems:    Acute pancreatitis able    Anemia  - hgb up and down  - some reported melena 7/2  - hgb 7.9 this am repeat in am  - trend and monitor    Toe discoloration/blue toe syndrome  - seen by vascular surgery, suspect microemboli from aorta  -non painful, dp pulses intact b/l  -no i filed at 07/04/18 1459   Gross per 24 hour   Intake              670 ml   Output               11 ml   Net              659 ml       Last 3 Weights  07/04/18 0643 : 163 lb (73.9 kg)  07/03/18 0538 : 162 lb 11.2 oz (73.8 kg)  07/02/18 0600 : 167 lb 12.8 oz CL  102  102  100   CO2  31  31  29       Recent Labs   Lab  06/30/18   0525  06/30/18   0634  07/01/18   0500  07/02/18   0515  07/02/18   1316  07/03/18   0542   ALT   --   69*  59*  56*  58*  60*   AST   --   81*  56*  62*  57*  55*   ALB  2.1*   --

## 2018-07-04 NOTE — PROGRESS NOTES
Pulmonary Progress Note      NAME: Jonel Ramirez - ROOM: 560/560-A - MRN: [id number] - Age: 80year old - : 1925    Assessment/Plan:  1.  Acute hypoxic respiratory failure -   - Multifactorial: Aspiration, pulm edema/pleural effusions  - Wean O2 Appropriate mood, appropriate affect    Recent Labs   Lab  07/04/18   0506   RBC  2.54*   HGB  7.9*   HCT  23.5*   MCV  92.3   MCH  30.9   MCHC  33.5   RDW  15.5*   WBC  5.2   PLT  269     Recent Labs   Lab  07/02/18   0515  07/02/18   1316  07/03/18   054

## 2018-07-04 NOTE — PROGRESS NOTES
[facility] AND [facility]  Gastroenterology Progress Note    Norman Ramesh Patient Status:  Inpatient    1925 MRN [id number]   Location [facility] 5SW/SE Attending Hema Odell MD   Hosp Day # 16 PCP REMI MCCOLLUM DO,      Subjective:   Luna Whitman Sut bleeding  -c/w PPI    Eddie Elmore MD  7/4/2018  9:43 AM

## 2018-07-04 NOTE — OCCUPATIONAL THERAPY NOTE
Pt not seen for OT at this time secondary to being up in chair and declining, stating that she had a terrible night and will not be able to do anything. Pt encouraged to attempt to stand to maximize strength, with pt adamantly declining.  Will attempt to se

## 2018-07-04 NOTE — PLAN OF CARE
Problem: RESPIRATORY - ADULT  Goal: Achieves optimal ventilation and oxygenation  INTERVENTIONS:  - Assess for changes in respiratory status  - Assess for changes in mentation and behavior  - Position to facilitate oxygenation and minimize respiratory effo Administer analgesics based on type and severity of pain and evaluate response  - Implement non-pharmacological measures as appropriate and evaluate response  - Consider cultural and social influences on pain and pain management  - Manage/alleviate anxiety

## 2018-07-05 LAB
ALBUMIN SERPL BCP-MCNC: 2.2 G/DL (ref 3.5–4.8)
ALBUMIN/GLOB SERPL: 0.6 {RATIO} (ref 1–2)
ALP SERPL-CCNC: 161 U/L (ref 32–100)
ALT SERPL-CCNC: 65 U/L (ref 14–54)
ANION GAP SERPL CALC-SCNC: 7 MMOL/L (ref 0–18)
AST SERPL-CCNC: 57 U/L (ref 15–41)
BASOPHILS # BLD: 0 K/UL (ref 0–0.2)
BASOPHILS NFR BLD: 1 %
BILIRUB SERPL-MCNC: 1 MG/DL (ref 0.3–1.2)
BUN SERPL-MCNC: 20 MG/DL (ref 8–20)
BUN/CREAT SERPL: 17.1 (ref 10–20)
CALCIUM SERPL-MCNC: 8.4 MG/DL (ref 8.5–10.5)
CHLORIDE SERPL-SCNC: 103 MMOL/L (ref 95–110)
CO2 SERPL-SCNC: 30 MMOL/L (ref 22–32)
CREAT SERPL-MCNC: 1.17 MG/DL (ref 0.5–1.5)
EOSINOPHIL # BLD: 0.2 K/UL (ref 0–0.7)
EOSINOPHIL NFR BLD: 4 %
ERYTHROCYTE [DISTWIDTH] IN BLOOD BY AUTOMATED COUNT: 16 % (ref 11–15)
GLOBULIN PLAS-MCNC: 3.5 G/DL (ref 2.5–3.7)
GLUCOSE SERPL-MCNC: 116 MG/DL (ref 70–99)
HCT VFR BLD AUTO: 24 % (ref 35–48)
HGB BLD-MCNC: 8 G/DL (ref 12–16)
LYMPHOCYTES # BLD: 0.6 K/UL (ref 1–4)
LYMPHOCYTES NFR BLD: 13 %
MAGNESIUM SERPL-MCNC: 1.8 MG/DL (ref 1.8–2.5)
MCH RBC QN AUTO: 30.9 PG (ref 27–32)
MCHC RBC AUTO-ENTMCNC: 33.2 G/DL (ref 32–37)
MCV RBC AUTO: 93.1 FL (ref 80–100)
MONOCYTES # BLD: 0.4 K/UL (ref 0–1)
MONOCYTES NFR BLD: 8 %
NEUTROPHILS # BLD AUTO: 3.9 K/UL (ref 1.8–7.7)
NEUTROPHILS NFR BLD: 76 %
OSMOLALITY UR CALC.SUM OF ELEC: 294 MOSM/KG (ref 275–295)
PLATELET # BLD AUTO: 271 K/UL (ref 140–400)
PMV BLD AUTO: 9.4 FL (ref 7.4–10.3)
POTASSIUM SERPL-SCNC: 4.1 MMOL/L (ref 3.3–5.1)
PROT SERPL-MCNC: 5.7 G/DL (ref 5.9–8.4)
RBC # BLD AUTO: 2.57 M/UL (ref 3.7–5.4)
SODIUM SERPL-SCNC: 140 MMOL/L (ref 136–144)
WBC # BLD AUTO: 5.2 K/UL (ref 4–11)

## 2018-07-05 PROCEDURE — 97530 THERAPEUTIC ACTIVITIES: CPT

## 2018-07-05 PROCEDURE — 80053 COMPREHEN METABOLIC PANEL: CPT | Performed by: HOSPITALIST

## 2018-07-05 PROCEDURE — A4216 STERILE WATER/SALINE, 10 ML: HCPCS | Performed by: HOSPITALIST

## 2018-07-05 PROCEDURE — 85025 COMPLETE CBC W/AUTO DIFF WBC: CPT | Performed by: HOSPITALIST

## 2018-07-05 PROCEDURE — 97110 THERAPEUTIC EXERCISES: CPT

## 2018-07-05 PROCEDURE — 97535 SELF CARE MNGMENT TRAINING: CPT

## 2018-07-05 PROCEDURE — 94640 AIRWAY INHALATION TREATMENT: CPT

## 2018-07-05 PROCEDURE — C9113 INJ PANTOPRAZOLE SODIUM, VIA: HCPCS | Performed by: HOSPITALIST

## 2018-07-05 PROCEDURE — 97116 GAIT TRAINING THERAPY: CPT

## 2018-07-05 PROCEDURE — 83735 ASSAY OF MAGNESIUM: CPT | Performed by: HOSPITALIST

## 2018-07-05 RX ORDER — MAGNESIUM OXIDE 400 MG (241.3 MG MAGNESIUM) TABLET
400 TABLET ONCE
Status: COMPLETED | OUTPATIENT
Start: 2018-07-05 | End: 2018-07-05

## 2018-07-05 RX ORDER — METOPROLOL TARTRATE 50 MG/1
50 TABLET, FILM COATED ORAL
Status: DISCONTINUED | OUTPATIENT
Start: 2018-07-05 | End: 2018-07-10

## 2018-07-05 NOTE — PROGRESS NOTES
[facility] Hospitalist Progress Note     CC: Hospital Follow up    PCP: REMI MCCOLLUM DO,        Assessment/Plan:     Principal Problem:    Acute pancreatitis, unspecified complication status, unspecified pancreatitis type  Active Problems:    Acute pancreatitis able    Anemia  - hgb up and down  - some reported melena 7/2  - hgb 8.1 this am repeat in am  - trend and monitor    Toe discoloration/blue toe syndrome  - seen by vascular surgery, suspect microemboli from aorta  -non painful, dp pulses intact b/l  -no i 07/05/18 0658   Gross per 24 hour   Intake              290 ml   Output                5 ml   Net              285 ml       Last 3 Weights  07/05/18 0658 : 164 lb (74.4 kg)  07/04/18 0643 : 163 lb (73.9 kg)  07/03/18 0538 : 162 lb 11.2 oz (73.8 kg)  07/02/ 3.6  4.0  4.1   CL  102  100   --   103   CO2  31  29   --   30       Recent Labs   Lab  07/01/18   0500  07/02/18   0515  07/02/18   1316  07/03/18   0542  07/05/18   0509   ALT  59*  56*  58*  60*  65*   AST  56*  62*  57*  55*  57*   ALB  2.1*  2.2*  2.

## 2018-07-05 NOTE — PLAN OF CARE
Problem: Patient Centered Care  Goal: Patient preferences are identified and integrated in the patient's plan of care  Interventions:  - What would you like us to know as we care for you?  Lives at home with Son and family  - Provide timely, complete, and a respiratory difficulty  - Respiratory Therapy support as indicated  - Manage/alleviate anxiety  - Monitor for signs/symptoms of CO2 retention   Outcome: Progressing  2L o2 w/bubbler, spo2 wiring replaced as it wasn't working.     Problem: GASTROINTESTINAL - devices as appropriate  - Consider OT/PT consult to assist with strengthening/mobility  - Encourage toileting schedule   Outcome: Progressing  2 x assist to rolling chair, hourly and prn rounding implemented.     Problem: NEUROLOGICAL - ADULT  Goal: Achieve

## 2018-07-05 NOTE — PROGRESS NOTES
[facility] AND [facility]  Gastroenterology Progress Note    [place] Ya Patient Status:  Inpatient    1925 MRN [id number]   Location [facility] 5SW/SE Attending Yuriy Sykes MD   Hosp Day # 18 PCP REMI MCCOLLUM DO, DO     Subjective:   Loren Brower Sut endoscopic evaluation at this time  -c/w PPI  -Trend CBC    Bridgett Nettles MD  7/5/2018  12:51 PM

## 2018-07-05 NOTE — PROGRESS NOTES
Pulmonary Progress Note      NAME: Faye Ivey - [facility] - MRN: [id number] - Age: 80year old - : 1925    Assessment/Plan:  1.  Acute hypoxic respiratory failure -   - Multifactorial: Aspiration, pulm edema/pleural effusions  - Wean O2 07/05/18   0510   RBC  2.57*   HGB  8.0*   HCT  24.0*   MCV  93.1   MCH  30.9   MCHC  33.2   RDW  16.0*   WBC  5.2   PLT  271     Recent Labs   Lab  07/02/18   1316  07/03/18   0542  07/04/18   0506  07/04/18   1647  07/05/18   0509   GLU   --   98  104*

## 2018-07-05 NOTE — PROGRESS NOTES
[facility]    Progress Note    [place] Ya Patient Status:  Inpatient    1925 MRN [id number]   Location [facility] 5SW/SE Attending Yuriy Sykes MD   Hosp Day # 18 PCP REMI WILL, DO, DO     Subjective:     Pt feels strong hernia likely not much change. However patient has been having some chronic mild obstructive symptoms. This seems possible cause for the aspiration.   Patient's been worked up and treated for possible aspiration, though pt states swallowing well now    IV

## 2018-07-05 NOTE — OCCUPATIONAL THERAPY NOTE
OCCUPATIONAL THERAPY TREATMENT NOTE - INPATIENT        Room Number: 560/560-A           Presenting Problem:  (pancreatitis)    Problem List  Principal Problem:    Acute pancreatitis, unspecified complication status, unspecified pancreatitis type  Active Pr ‘6-Clicks’ Inpatient Daily Activity Short Form  How much help from another person does the patient currently need…  -   Putting on and taking off regular lower body clothing?: A Lot  -   Bathing (including washing, rinsing, drying)?: A Lot  -   Toileting,

## 2018-07-05 NOTE — PHYSICAL THERAPY NOTE
PHYSICAL THERAPY TREATMENT NOTE - INPATIENT     Room Number: 560/560-A       Presenting Problem: acute pancreatis s/p ERCP 6/18    Problem List  Principal Problem:    Acute pancreatitis, unspecified complication status, unspecified pancreatitis type  Activ Static Sitting: Good  Dynamic Sitting: Good           Static Standing: Fair -  Dynamic Standing: Poor +    ACTIVITY TOLERANCE  O2 Saturation: 88% on lt's with amb and 90% with 2-3 min of seate #1 Patient is able to demonstrate supine - sit EOB @ level: modified independent      Goal #1   Current Status Min a   Goal #2 Patient is able to demonstrate transfers EOB to/from Chair/Wheelchair at assistance level: supervision with walker - rolling

## 2018-07-06 ENCOUNTER — APPOINTMENT (OUTPATIENT)
Dept: NUCLEAR MEDICINE | Facility: HOSPITAL | Age: 83
DRG: 438 | End: 2018-07-06
Attending: CLINICAL NURSE SPECIALIST
Payer: MEDICARE

## 2018-07-06 LAB
ANION GAP SERPL CALC-SCNC: 9 MMOL/L (ref 0–18)
BASOPHILS # BLD: 0 K/UL (ref 0–0.2)
BASOPHILS NFR BLD: 1 %
BUN SERPL-MCNC: 15 MG/DL (ref 8–20)
BUN/CREAT SERPL: 14.7 (ref 10–20)
CALCIUM SERPL-MCNC: 8.4 MG/DL (ref 8.5–10.5)
CHLORIDE SERPL-SCNC: 100 MMOL/L (ref 95–110)
CO2 SERPL-SCNC: 31 MMOL/L (ref 22–32)
CREAT SERPL-MCNC: 1.02 MG/DL (ref 0.5–1.5)
EOSINOPHIL # BLD: 0.2 K/UL (ref 0–0.7)
EOSINOPHIL NFR BLD: 3 %
ERYTHROCYTE [DISTWIDTH] IN BLOOD BY AUTOMATED COUNT: 16.1 % (ref 11–15)
GLUCOSE SERPL-MCNC: 119 MG/DL (ref 70–99)
HCT VFR BLD AUTO: 25.3 % (ref 35–48)
HGB BLD-MCNC: 8.5 G/DL (ref 12–16)
LYMPHOCYTES # BLD: 0.6 K/UL (ref 1–4)
LYMPHOCYTES NFR BLD: 12 %
MAGNESIUM SERPL-MCNC: 1.5 MG/DL (ref 1.8–2.5)
MCH RBC QN AUTO: 31.3 PG (ref 27–32)
MCHC RBC AUTO-ENTMCNC: 33.6 G/DL (ref 32–37)
MCV RBC AUTO: 93 FL (ref 80–100)
MONOCYTES # BLD: 0.4 K/UL (ref 0–1)
MONOCYTES NFR BLD: 7 %
NEUTROPHILS # BLD AUTO: 4.3 K/UL (ref 1.8–7.7)
NEUTROPHILS NFR BLD: 77 %
OSMOLALITY UR CALC.SUM OF ELEC: 292 MOSM/KG (ref 275–295)
PLATELET # BLD AUTO: 295 K/UL (ref 140–400)
PMV BLD AUTO: 9 FL (ref 7.4–10.3)
POTASSIUM SERPL-SCNC: 3.5 MMOL/L (ref 3.3–5.1)
RBC # BLD AUTO: 2.73 M/UL (ref 3.7–5.4)
SODIUM SERPL-SCNC: 140 MMOL/L (ref 136–144)
WBC # BLD AUTO: 5.5 K/UL (ref 4–11)

## 2018-07-06 PROCEDURE — 83735 ASSAY OF MAGNESIUM: CPT | Performed by: HOSPITALIST

## 2018-07-06 PROCEDURE — 80048 BASIC METABOLIC PNL TOTAL CA: CPT | Performed by: HOSPITALIST

## 2018-07-06 PROCEDURE — 94640 AIRWAY INHALATION TREATMENT: CPT

## 2018-07-06 PROCEDURE — A4216 STERILE WATER/SALINE, 10 ML: HCPCS | Performed by: INTERNAL MEDICINE

## 2018-07-06 PROCEDURE — 0F9430Z DRAINAGE OF GALLBLADDER WITH DRAINAGE DEVICE, PERCUTANEOUS APPROACH: ICD-10-PCS | Performed by: RADIOLOGY

## 2018-07-06 PROCEDURE — 85025 COMPLETE CBC W/AUTO DIFF WBC: CPT | Performed by: HOSPITALIST

## 2018-07-06 PROCEDURE — 78226 HEPATOBILIARY SYSTEM IMAGING: CPT | Performed by: CLINICAL NURSE SPECIALIST

## 2018-07-06 RX ORDER — POTASSIUM CHLORIDE 20 MEQ/1
40 TABLET, EXTENDED RELEASE ORAL EVERY 4 HOURS
Status: COMPLETED | OUTPATIENT
Start: 2018-07-06 | End: 2018-07-06

## 2018-07-06 RX ORDER — MAGNESIUM OXIDE 400 MG (241.3 MG MAGNESIUM) TABLET
800 TABLET ONCE
Status: COMPLETED | OUTPATIENT
Start: 2018-07-06 | End: 2018-07-06

## 2018-07-06 RX ORDER — SODIUM CHLORIDE 9 MG/ML
INJECTION, SOLUTION INTRAVENOUS
Status: COMPLETED
Start: 2018-07-06 | End: 2018-07-06

## 2018-07-06 RX ORDER — FUROSEMIDE 20 MG/1
20 TABLET ORAL DAILY
Qty: 30 TABLET | Refills: 0 | Status: SHIPPED | OUTPATIENT
Start: 2018-07-07 | End: 2018-08-06

## 2018-07-06 RX ORDER — METOPROLOL TARTRATE 50 MG/1
50 TABLET, FILM COATED ORAL
Qty: 60 TABLET | Refills: 0 | Status: SHIPPED | OUTPATIENT
Start: 2018-07-06 | End: 2018-08-05

## 2018-07-06 RX ORDER — MELATONIN
325 2 TIMES DAILY WITH MEALS
Qty: 60 TABLET | Refills: 0 | Status: SHIPPED | OUTPATIENT
Start: 2018-07-06 | End: 2018-08-05

## 2018-07-06 RX ORDER — PANTOPRAZOLE SODIUM 40 MG/1
40 TABLET, DELAYED RELEASE ORAL
Status: DISCONTINUED | OUTPATIENT
Start: 2018-07-06 | End: 2018-07-10

## 2018-07-06 NOTE — PLAN OF CARE
Problem: Patient Centered Care  Goal: Patient preferences are identified and integrated in the patient's plan of care  Interventions:  - What would you like us to know as we care for you?  Lives at home with Son and family  - Provide timely, complete, and a GASTROINTESTINAL - ADULT  Goal: Minimal or absence of nausea and vomiting  INTERVENTIONS:  - Maintain adequate hydration with IV or PO as ordered and tolerated  - Nasogastric tube to low intermittent suction as ordered  - Evaluate effectiveness of ordered pt/family on patient safety including physical limitations  - Instruct pt to call for assistance with activity based on assessment  - Modify environment to reduce risk of injury  - Provide assistive devices as appropriate  - Consider OT/PT consult to agustín

## 2018-07-06 NOTE — PROGRESS NOTES
Pulmonary Progress Note     Assessment / Plan:  1. Acute hypoxic respiratory failure - multifactorial due to aspiration, pulm edema/pleural effusions  - wean O2 as able  - IS and ambulate as able   - bd protocol  - po lasix  2.  Gallstone pancreatitis - s/p

## 2018-07-06 NOTE — PROGRESS NOTES
[facility] Hospitalist Progress Note     CC: Hospital Follow up    PCP: REMI MCCOLLUM DO,        Assessment/Plan:     Principal Problem:    Acute pancreatitis, unspecified complication status, unspecified pancreatitis type  Active Problems:    Acute pancreatitis course of abx for pna, cont dosing for possible cholecystitis  - given dose IV lasix 6/29, repeat Lasix 7/1 given worsening resp status  - will continue oral lasix 20mg daily  - Push IS as atelectasis contributing to above  - wean O2 as able    Anemia  - h SpO2 95 %.     Temp:  [97.5 °F (36.4 °C)-98.4 °F (36.9 °C)] 98.4 °F (36.9 °C)  Pulse:  [80-88] 80  Resp:  [18-20] 18  BP: (142-188)/(62-89) 157/64      Intake/Output:    Intake/Output Summary (Last 24 hours) at 07/06/18 1314  Last data filed at 07/06/18 130 0625   GLU  104*   --   116*  119*   BUN  25*   --   20  15   CREATSERUM  1.20   --   1.17  1.02   GFRAA  45*   --   47*  55*   GFRNAA  39*   --   41*  48*   CA  8.2*   --   8.4*  8.4*   NA  137   --   140  140   K  3.6  3.6  4.0  4.1  3.5   CL  100   --

## 2018-07-06 NOTE — PHYSICAL THERAPY NOTE
Chart reviewed    reattempt in PM ----pt at test and not available       PT will follow up later as schedule allows

## 2018-07-06 NOTE — PHYSICAL THERAPY NOTE
Chart reviewed        RN approve participation          Family present in room and with concerns about therapy at this time . RN and family reports accidental pulling of fozia drain this AM .     Pt with scans pending .     Family reports pt has not s

## 2018-07-06 NOTE — PROGRESS NOTES
Biliary scan back. Per Dr. Farideh Fields, patient can have low fiber/soft diet as patient will not be able to have drain placed tomorrow since IR is not here on weekends.

## 2018-07-06 NOTE — PROGRESS NOTES
[facility] Pharmacy Note: Route Optimization for Pantoprazole (PROTONIX)    Patient is currently on Pantoprazole (PROTONIX) 40 mg IV every 24hours.    The patient meets the criteria to convert to the oral equivalent as established by the IV to Oral conversion prot

## 2018-07-06 NOTE — PLAN OF CARE
GASTROINTESTINAL - ADULT    • Maintains or returns to baseline bowel function Not Progressing        RESPIRATORY - ADULT    • Achieves optimal ventilation and oxygenation Not Progressing            Patient pulled off oxygen in sleep, oxygen saturation 72%

## 2018-07-06 NOTE — PROGRESS NOTES
[facility]    Progress Note    Faye Ivey Patient Status:  Inpatient    1925 MRN [id number]   Location [facility] 5SW/SE Attending Abhilash Ivey MD   Hosp Day # 23 PCP REMI MCCOLLUM, DO, DO     Subjective:     Pt feels strong likely not much change. However patient has been having some chronic mild obstructive symptoms. This seems possible cause for the aspiration.   Patient's been worked up and treated for possible aspiration, though pt states swallowing well now    Protonix 41*  39*   --   41*  48*   CA   --   8.4*  8.2*   --   8.4*  8.4*   ALB  2.2*  2.1*   --    --   2.2*   --    NA   --   141  137   --   140  140   K   --   3.5  3.6  3.6  4.0  4.1  3.5   CL   --   102  100   --   103  100   CO2   --   31  29   --   30  31

## 2018-07-06 NOTE — PROGRESS NOTES
[facility]  [facility] Note      Payal Montes [place] Patient Status:  Inpatient    1925 MRN [id number]   Location [facility] 5SW/SE Attending Graciela Willard MD   Hosp Day # 23 PCP REMI MCCOLLUM, DO, DO       Subjective:   Comfortable, de

## 2018-07-07 LAB
ALBUMIN SERPL BCP-MCNC: 2.2 G/DL (ref 3.5–4.8)
ALBUMIN/GLOB SERPL: 0.5 {RATIO} (ref 1–2)
ALP SERPL-CCNC: 175 U/L (ref 32–100)
ALT SERPL-CCNC: 75 U/L (ref 14–54)
ANION GAP SERPL CALC-SCNC: 11 MMOL/L (ref 0–18)
AST SERPL-CCNC: 64 U/L (ref 15–41)
BASOPHILS # BLD: 0 K/UL (ref 0–0.2)
BASOPHILS NFR BLD: 0 %
BILIRUB SERPL-MCNC: 1 MG/DL (ref 0.3–1.2)
BUN SERPL-MCNC: 14 MG/DL (ref 8–20)
BUN/CREAT SERPL: 11.8 (ref 10–20)
CALCIUM SERPL-MCNC: 8 MG/DL (ref 8.5–10.5)
CHLORIDE SERPL-SCNC: 98 MMOL/L (ref 95–110)
CO2 SERPL-SCNC: 28 MMOL/L (ref 22–32)
CREAT SERPL-MCNC: 1.19 MG/DL (ref 0.5–1.5)
EOSINOPHIL # BLD: 0.2 K/UL (ref 0–0.7)
EOSINOPHIL NFR BLD: 4 %
ERYTHROCYTE [DISTWIDTH] IN BLOOD BY AUTOMATED COUNT: 16.3 % (ref 11–15)
GLOBULIN PLAS-MCNC: 4.2 G/DL (ref 2.5–3.7)
GLUCOSE SERPL-MCNC: 183 MG/DL (ref 70–99)
HCT VFR BLD AUTO: 24.4 % (ref 35–48)
HGB BLD-MCNC: 8.1 G/DL (ref 12–16)
LYMPHOCYTES # BLD: 0.6 K/UL (ref 1–4)
LYMPHOCYTES NFR BLD: 10 %
MAGNESIUM SERPL-MCNC: 1.5 MG/DL (ref 1.8–2.5)
MCH RBC QN AUTO: 30.9 PG (ref 27–32)
MCHC RBC AUTO-ENTMCNC: 33.2 G/DL (ref 32–37)
MCV RBC AUTO: 92.8 FL (ref 80–100)
MONOCYTES # BLD: 0.4 K/UL (ref 0–1)
MONOCYTES NFR BLD: 7 %
NEUTROPHILS # BLD AUTO: 4.3 K/UL (ref 1.8–7.7)
NEUTROPHILS NFR BLD: 79 %
OSMOLALITY UR CALC.SUM OF ELEC: 289 MOSM/KG (ref 275–295)
PLATELET # BLD AUTO: 257 K/UL (ref 140–400)
PMV BLD AUTO: 8.7 FL (ref 7.4–10.3)
POTASSIUM SERPL-SCNC: 3.7 MMOL/L (ref 3.3–5.1)
POTASSIUM SERPL-SCNC: 3.7 MMOL/L (ref 3.3–5.1)
PROT SERPL-MCNC: 6.4 G/DL (ref 5.9–8.4)
RBC # BLD AUTO: 2.63 M/UL (ref 3.7–5.4)
SODIUM SERPL-SCNC: 137 MMOL/L (ref 136–144)
WBC # BLD AUTO: 5.5 K/UL (ref 4–11)

## 2018-07-07 PROCEDURE — 84132 ASSAY OF SERUM POTASSIUM: CPT | Performed by: HOSPITALIST

## 2018-07-07 PROCEDURE — 83735 ASSAY OF MAGNESIUM: CPT | Performed by: HOSPITALIST

## 2018-07-07 PROCEDURE — 97116 GAIT TRAINING THERAPY: CPT

## 2018-07-07 PROCEDURE — 80053 COMPREHEN METABOLIC PANEL: CPT | Performed by: HOSPITALIST

## 2018-07-07 PROCEDURE — 94640 AIRWAY INHALATION TREATMENT: CPT

## 2018-07-07 PROCEDURE — A4216 STERILE WATER/SALINE, 10 ML: HCPCS | Performed by: INTERNAL MEDICINE

## 2018-07-07 PROCEDURE — 85025 COMPLETE CBC W/AUTO DIFF WBC: CPT | Performed by: HOSPITALIST

## 2018-07-07 RX ORDER — POTASSIUM CHLORIDE 20 MEQ/1
40 TABLET, EXTENDED RELEASE ORAL ONCE
Status: COMPLETED | OUTPATIENT
Start: 2018-07-07 | End: 2018-07-07

## 2018-07-07 RX ORDER — MAGNESIUM OXIDE 400 MG (241.3 MG MAGNESIUM) TABLET
800 TABLET ONCE
Status: COMPLETED | OUTPATIENT
Start: 2018-07-07 | End: 2018-07-07

## 2018-07-07 RX ORDER — HEPARIN SODIUM 5000 [USP'U]/ML
5000 INJECTION, SOLUTION INTRAVENOUS; SUBCUTANEOUS EVERY 12 HOURS SCHEDULED
Status: COMPLETED | OUTPATIENT
Start: 2018-07-07 | End: 2018-07-08

## 2018-07-07 NOTE — PLAN OF CARE
Problem: Patient Centered Care  Goal: Patient preferences are identified and integrated in the patient's plan of care  Interventions:  - What would you like us to know as we care for you?  Lives at home with Son and family  - Provide timely, complete, and a injury  INTERVENTIONS:  - Assess pt frequently for physical needs  - Identify cognitive and physical deficits and behaviors that affect risk of falls.   - [place] fall precautions as indicated by assessment.  - Educate pt/family on patient safety includin

## 2018-07-07 NOTE — PROGRESS NOTES
[facility]  Gastroenterology Progress Note    Yousif Sood Patient Status:  Inpatient    1925 MRN [id number]   Location [facility] 5SW/SE Attending Deo Navas MD   Hosp Day # 20 PCP REMI MCCOLLUM DO, DO     Subjective:   Jagdish Oakley Pancreatitis      Assessment/Plan:  Ms Thanh Salas is a 79 y/o female with prolonged hospital course. She is s/p ERCP with sphincterotomy on 6/18 for choledocolithiasis.  During that procedure she did have a linear tear in large hiatal hernia, superficial. She a

## 2018-07-07 NOTE — PROGRESS NOTES
[facility] Hospitalist Progress Note     CC: Hospital Follow up    PCP: REMI MCCOLLUM DO,        Assessment/Plan:     Principal Problem:    Acute pancreatitis, unspecified complication status, unspecified pancreatitis type  Active Problems:    Acute pancreatitis completed course of abx for pna, cont dosing for possible cholecystitis  - given dose IV lasix 6/29, repeat Lasix 7/1 given worsening resp status  - will continue oral lasix 20mg daily  - Push IS as atelectasis contributing to above  - wean O2 as able    A SpO2 96 %.     Temp:  [97.4 °F (36.3 °C)-98.7 °F (37.1 °C)] 97.5 °F (36.4 °C)  Pulse:  [73-86] 76  Resp:  [16-18] 18  BP: (114-151)/(50-80) 134/74      Intake/Output:    Intake/Output Summary (Last 24 hours) at 07/07/18 1531  Last data filed at 07/07/18 130 119*  183*   BUN  20  15  14   CREATSERUM  1.17  1.02  1.19   GFRAA  47*  55*  46*   GFRNAA  41*  48*  40*   CA  8.4*  8.4*  8.0*   NA  140  140  137   K  4.1  3.5  3.7  3.7   CL  103  100  98   CO2  30  31  28       Recent Labs   Lab  07/02/18   0515  07/

## 2018-07-07 NOTE — PHYSICAL THERAPY NOTE
PHYSICAL THERAPY TREATMENT NOTE - INPATIENT     Room Number: 560/560-A       Presenting Problem: admitted w/  1 week of intermittent mild abd pain and diarrhea.     Problem List  Principal Problem:    Acute pancreatitis, unspecified complication status, uns PAIN ASSESSMENT   Ratin  Location: stomach  Management Techniques: Activity promotion; Body mechanics;Breathing techniques;Relaxation;Repositioning    BALANCE supervision with walker - rolling      Goal #2  Current Status Min A   Goal #3 Patient is able to ambulate 150 feet with assist device: walker - rolling at assistance level: supervision   Goal #3   Current Status Pt amb22' x 2 ft w/  R.W and Min A with 3 L

## 2018-07-07 NOTE — PROGRESS NOTES
[facility]    Progress Note    Violeta Kelly Patient Status:  Inpatient    1925 MRN [id number]   Location [facility] 5SW/SE Attending Eden Jaimes MD   Hosp Day # 20 PCP REMI WILL, DO, DO     Subjective:     Pt feels strong Patient's been worked up and treated for possible aspiration, though pt states swallowing well now  Protonix for hiatal hernia      Cont observation and medical tx for pancreatitis for present time, clinically and labs and radiologically improved    DVT Pr CL  102   < >  103  100  98   CO2  31   < >  30  31  28   ALKPHO  153*   --   161*   --   175*   AST  55*   --   57*   --   64*   ALT  60*   --   65*   --   75*   BILT  1.2   --   1.0   --   1.0   TP  5.7*   --   5.7*   --   6.4    < > = values in this i

## 2018-07-08 LAB
ANION GAP SERPL CALC-SCNC: 10 MMOL/L (ref 0–18)
BASOPHILS # BLD: 0.1 K/UL (ref 0–0.2)
BASOPHILS NFR BLD: 1 %
BUN SERPL-MCNC: 15 MG/DL (ref 8–20)
BUN/CREAT SERPL: 11.1 (ref 10–20)
CALCIUM SERPL-MCNC: 8.3 MG/DL (ref 8.5–10.5)
CHLORIDE SERPL-SCNC: 100 MMOL/L (ref 95–110)
CO2 SERPL-SCNC: 29 MMOL/L (ref 22–32)
CREAT SERPL-MCNC: 1.35 MG/DL (ref 0.5–1.5)
EOSINOPHIL # BLD: 0.2 K/UL (ref 0–0.7)
EOSINOPHIL NFR BLD: 5 %
ERYTHROCYTE [DISTWIDTH] IN BLOOD BY AUTOMATED COUNT: 16.2 % (ref 11–15)
GLUCOSE SERPL-MCNC: 115 MG/DL (ref 70–99)
HCT VFR BLD AUTO: 24.7 % (ref 35–48)
HGB BLD-MCNC: 8.2 G/DL (ref 12–16)
LYMPHOCYTES # BLD: 0.8 K/UL (ref 1–4)
LYMPHOCYTES NFR BLD: 17 %
MAGNESIUM SERPL-MCNC: 1.4 MG/DL (ref 1.8–2.5)
MCH RBC QN AUTO: 31.2 PG (ref 27–32)
MCHC RBC AUTO-ENTMCNC: 33.2 G/DL (ref 32–37)
MCV RBC AUTO: 93.8 FL (ref 80–100)
MONOCYTES # BLD: 0.3 K/UL (ref 0–1)
MONOCYTES NFR BLD: 7 %
NEUTROPHILS # BLD AUTO: 3.1 K/UL (ref 1.8–7.7)
NEUTROPHILS NFR BLD: 69 %
OSMOLALITY UR CALC.SUM OF ELEC: 290 MOSM/KG (ref 275–295)
PLATELET # BLD AUTO: 268 K/UL (ref 140–400)
PMV BLD AUTO: 9.6 FL (ref 7.4–10.3)
POTASSIUM SERPL-SCNC: 3.6 MMOL/L (ref 3.3–5.1)
POTASSIUM SERPL-SCNC: 3.6 MMOL/L (ref 3.3–5.1)
RBC # BLD AUTO: 2.63 M/UL (ref 3.7–5.4)
SODIUM SERPL-SCNC: 139 MMOL/L (ref 136–144)
WBC # BLD AUTO: 4.5 K/UL (ref 4–11)

## 2018-07-08 PROCEDURE — 85025 COMPLETE CBC W/AUTO DIFF WBC: CPT | Performed by: HOSPITALIST

## 2018-07-08 PROCEDURE — 84132 ASSAY OF SERUM POTASSIUM: CPT | Performed by: HOSPITALIST

## 2018-07-08 PROCEDURE — 80048 BASIC METABOLIC PNL TOTAL CA: CPT | Performed by: HOSPITALIST

## 2018-07-08 PROCEDURE — A4216 STERILE WATER/SALINE, 10 ML: HCPCS | Performed by: INTERNAL MEDICINE

## 2018-07-08 PROCEDURE — 36592 COLLECT BLOOD FROM PICC: CPT

## 2018-07-08 PROCEDURE — 83735 ASSAY OF MAGNESIUM: CPT | Performed by: HOSPITALIST

## 2018-07-08 PROCEDURE — 94640 AIRWAY INHALATION TREATMENT: CPT

## 2018-07-08 RX ORDER — POTASSIUM CHLORIDE 20 MEQ/1
40 TABLET, EXTENDED RELEASE ORAL EVERY 4 HOURS
Status: COMPLETED | OUTPATIENT
Start: 2018-07-08 | End: 2018-07-08

## 2018-07-08 NOTE — PLAN OF CARE
Problem: Patient Centered Care  Goal: Patient preferences are identified and integrated in the patient's plan of care  Interventions:  - What would you like us to know as we care for you?  Lives at home with Son and family  - Provide timely, complete, and a Outcome: Progressing      Problem: GASTROINTESTINAL - ADULT  Goal: Minimal or absence of nausea and vomiting  INTERVENTIONS:  - Maintain adequate hydration with IV or PO as ordered and tolerated  - Nasogastric tube to low intermittent suction as ordered

## 2018-07-08 NOTE — PLAN OF CARE
Problem: Patient Centered Care  Goal: Patient preferences are identified and integrated in the patient's plan of care  Interventions:  - What would you like us to know as we care for you?  Lives at home with Son and family  - Provide timely, complete, and a Progressing  Patient on 3 L NC, no home o2     Problem: GASTROINTESTINAL - ADULT  Goal: Minimal or absence of nausea and vomiting  INTERVENTIONS:  - Maintain adequate hydration with IV or PO as ordered and tolerated  - Nasogastric tube to low intermittent of falls.   - [place] fall precautions as indicated by assessment.  - Educate pt/family on patient safety including physical limitations  - Instruct pt to call for assistance with activity based on assessment  - Modify environment to reduce risk of injury

## 2018-07-08 NOTE — PROGRESS NOTES
[facility] AND [facility]  Gastroenterology Progress Note    Emory Londono Patient Status:  Inpatient    1925 MRN [id number]   Location [facility] 5SW/SE Attending Davy Suarez MD   Hosp Day # 21 PCP REMI MCCOLLUM DO, DO     Subjective:   Bernardo Ring Sut cholecystitis, fell out 7/6/18, HIDA still with cholecystitis. Hgb slowly drifted down during admission. Stable the last several days.  No clear overt signs of bleeding     -Trend CBC, no plans for endoscopic intervention at this time  -c/w PPI  -Plan to re

## 2018-07-08 NOTE — PROGRESS NOTES
[facility] Hospitalist Progress Note     CC: Hospital Follow up    PCP: REMI MCCOLLUM DO,        Assessment/Plan:     Principal Problem:    Acute pancreatitis, unspecified complication status, unspecified pancreatitis type  Active Problems:    Acute pancreatitis mid right and small L pleural effusion  - completed course of abx for pna, cont dosing for possible cholecystitis  - given dose IV lasix 6/29, repeat Lasix 7/1 given worsening resp status  - will continue oral lasix 20mg daily  - Push IS as atelectasis con 158.8 cm (5' 2.5\"), weight 167 lb 14.4 oz (76.2 kg), SpO2 94 %.     Temp:  [97.5 °F (36.4 °C)-98.5 °F (36.9 °C)] 98.5 °F (36.9 °C)  Pulse:  [71-89] 77  Resp:  [18] 18  BP: (132-149)/(74-91) 134/74      Intake/Output:    Intake/Output Summary (Last 24 hours Recent Labs   Lab  07/06/18   0625  07/07/18   0604  07/08/18   0550   GLU  119*  183*  115*   BUN  15  14  15   CREATSERUM  1.02  1.19  1.35   GFRAA  55*  46*  39*   GFRNAA  48*  40*  34*   CA  8.4*  8.0*  8.3*   NA  140  137  139   K  3.5  3.7  3

## 2018-07-09 ENCOUNTER — APPOINTMENT (OUTPATIENT)
Dept: INTERVENTIONAL RADIOLOGY/VASCULAR | Facility: HOSPITAL | Age: 83
DRG: 438 | End: 2018-07-09
Attending: CLINICAL NURSE SPECIALIST
Payer: MEDICARE

## 2018-07-09 LAB
ANION GAP SERPL CALC-SCNC: 10 MMOL/L (ref 0–18)
BASOPHILS # BLD: 0 K/UL (ref 0–0.2)
BASOPHILS NFR BLD: 1 %
BUN SERPL-MCNC: 16 MG/DL (ref 8–20)
BUN/CREAT SERPL: 11.7 (ref 10–20)
CALCIUM SERPL-MCNC: 8.2 MG/DL (ref 8.5–10.5)
CHLORIDE SERPL-SCNC: 99 MMOL/L (ref 95–110)
CO2 SERPL-SCNC: 28 MMOL/L (ref 22–32)
CREAT SERPL-MCNC: 1.37 MG/DL (ref 0.5–1.5)
EOSINOPHIL # BLD: 0.3 K/UL (ref 0–0.7)
EOSINOPHIL NFR BLD: 6 %
ERYTHROCYTE [DISTWIDTH] IN BLOOD BY AUTOMATED COUNT: 16.9 % (ref 11–15)
GLUCOSE SERPL-MCNC: 106 MG/DL (ref 70–99)
HCT VFR BLD AUTO: 24 % (ref 35–48)
HGB BLD-MCNC: 8 G/DL (ref 12–16)
LYMPHOCYTES # BLD: 0.7 K/UL (ref 1–4)
LYMPHOCYTES NFR BLD: 18 %
MAGNESIUM SERPL-MCNC: 2.1 MG/DL (ref 1.8–2.5)
MCH RBC QN AUTO: 31.4 PG (ref 27–32)
MCHC RBC AUTO-ENTMCNC: 33.5 G/DL (ref 32–37)
MCV RBC AUTO: 93.7 FL (ref 80–100)
MONOCYTES # BLD: 0.3 K/UL (ref 0–1)
MONOCYTES NFR BLD: 8 %
NEUTROPHILS # BLD AUTO: 2.9 K/UL (ref 1.8–7.7)
NEUTROPHILS NFR BLD: 67 %
OSMOLALITY UR CALC.SUM OF ELEC: 286 MOSM/KG (ref 275–295)
PLATELET # BLD AUTO: 257 K/UL (ref 140–400)
PMV BLD AUTO: 9.3 FL (ref 7.4–10.3)
POTASSIUM SERPL-SCNC: 4.1 MMOL/L (ref 3.3–5.1)
POTASSIUM SERPL-SCNC: 4.1 MMOL/L (ref 3.3–5.1)
RBC # BLD AUTO: 2.56 M/UL (ref 3.7–5.4)
SODIUM SERPL-SCNC: 137 MMOL/L (ref 136–144)
WBC # BLD AUTO: 4.3 K/UL (ref 4–11)

## 2018-07-09 PROCEDURE — 47490 INCISION OF GALLBLADDER: CPT

## 2018-07-09 PROCEDURE — A4216 STERILE WATER/SALINE, 10 ML: HCPCS | Performed by: HOSPITALIST

## 2018-07-09 PROCEDURE — 84132 ASSAY OF SERUM POTASSIUM: CPT | Performed by: HOSPITALIST

## 2018-07-09 PROCEDURE — 94640 AIRWAY INHALATION TREATMENT: CPT

## 2018-07-09 PROCEDURE — 85025 COMPLETE CBC W/AUTO DIFF WBC: CPT | Performed by: HOSPITALIST

## 2018-07-09 PROCEDURE — 83735 ASSAY OF MAGNESIUM: CPT | Performed by: HOSPITALIST

## 2018-07-09 PROCEDURE — 36592 COLLECT BLOOD FROM PICC: CPT

## 2018-07-09 PROCEDURE — 99153 MOD SED SAME PHYS/QHP EA: CPT

## 2018-07-09 PROCEDURE — 80048 BASIC METABOLIC PNL TOTAL CA: CPT | Performed by: HOSPITALIST

## 2018-07-09 PROCEDURE — A4216 STERILE WATER/SALINE, 10 ML: HCPCS | Performed by: INTERNAL MEDICINE

## 2018-07-09 PROCEDURE — 99152 MOD SED SAME PHYS/QHP 5/>YRS: CPT

## 2018-07-09 RX ORDER — CEFAZOLIN SODIUM/WATER 2 G/20 ML
SYRINGE (ML) INTRAVENOUS
Status: COMPLETED
Start: 2018-07-09 | End: 2018-07-09

## 2018-07-09 RX ORDER — LIDOCAINE HYDROCHLORIDE 20 MG/ML
INJECTION, SOLUTION EPIDURAL; INFILTRATION; INTRACAUDAL; PERINEURAL
Status: COMPLETED
Start: 2018-07-09 | End: 2018-07-09

## 2018-07-09 RX ORDER — SODIUM CHLORIDE 9 MG/ML
INJECTION, SOLUTION INTRAVENOUS
Status: COMPLETED
Start: 2018-07-09 | End: 2018-07-09

## 2018-07-09 RX ORDER — MIDAZOLAM HYDROCHLORIDE 1 MG/ML
INJECTION INTRAMUSCULAR; INTRAVENOUS
Status: COMPLETED
Start: 2018-07-09 | End: 2018-07-09

## 2018-07-09 NOTE — PROGRESS NOTES
[facility] Hospitalist Progress Note     CC: Hospital Follow up    PCP: REMI MCCOLLUM DO,        Assessment/Plan:     Principal Problem:    Acute pancreatitis, unspecified complication status, unspecified pancreatitis type  Active Problems:    Acute pancreatitis edema/ hiatal hernia/asthma  - weaned off bipap on 6/21  - CT 6/25 w mid right and small L pleural effusion  - completed course of abx for pna, cont dosing for possible cholecystitis  - given dose IV lasix 6/29, repeat Lasix 7/1 given worsening resp status N/V/D      OBJECTIVE:    Blood pressure 138/76, pulse 73, temperature 97.4 °F (36.3 °C), temperature source Oral, resp. rate 18, height 158.8 cm (5' 2.5\"), weight 167 lb 4.8 oz (75.9 kg), SpO2 90 %.     Temp:  [97.4 °F (36.3 °C)-97.8 °F (36.6 °C)] 97.4 °F 8.2*  8.0*   HCT  24.4*  24.7*  24.0*   MCV  92.8  93.8  93.7   MCH  30.9  31.2  31.4   MCHC  33.2  33.2  33.5   RDW  16.3*  16.2*  16.9*   WBC  5.5  4.5  4.3   PLT  257  268  257         Recent Labs   Lab  07/07/18   0604  07/08/18   0550  07/09/18   0546

## 2018-07-09 NOTE — PHYSICAL THERAPY NOTE
Attempted to see patient for physical therapy session. Patient off the floor getting fozia tube placed. Will attempt to see patient tomorrow for physical therapy session. RN aware.

## 2018-07-09 NOTE — PLAN OF CARE
Problem: Patient Centered Care  Goal: Patient preferences are identified and integrated in the patient's plan of care  Interventions:  - What would you like us to know as we care for you?  Lives at home with Son and family  - Provide timely, complete, and a home oxygen    Problem: GASTROINTESTINAL - ADULT  Goal: Minimal or absence of nausea and vomiting  INTERVENTIONS:  - Maintain adequate hydration with IV or PO as ordered and tolerated  - Nasogastric tube to low intermittent suction as ordered  - Evaluate e ADULT  Goal: Achieves stable or improved neurological status  INTERVENTIONS  - Assess for and report changes in neurological status  - Initiate measures to prevent increased intracranial pressure  - Maintain blood pressure and fluid volume within ordered p

## 2018-07-09 NOTE — PROGRESS NOTES
Pulmonary Progress Note     Assessment / Plan:  1. Acute hypoxic respiratory failure - multifactorial due to aspiration, pulm edema/pleural effusions. Improved  - wean O2 as able  - IS and ambulate as able   - bd protocol  - po lasix  2.  Gallstone pancreat

## 2018-07-09 NOTE — PROGRESS NOTES
[facility]    Progress Note    Mari Corrales Patient Status:  Inpatient    1925 MRN [id number]   Location [facility] 5SW/SE Attending Michelle Leonard MD   Hosp Day # 22 PCP REMI WILL, DO, DO     Subjective:     Pt feels strong patient has been having some chronic mild obstructive symptoms. This seems possible cause for the aspiration.   Patient's been worked up and treated for possible aspiration, though pt states swallowing well now  Protonix for hiatal hernia      DVT Prophy: 3.6  4.1  4.1   CL  102   < >  103   < >  98  100  99   CO2  31   < >  30   < >  28  29  28   ALKPHO  153*   --   161*   --   175*   --    --    AST  55*   --   57*   --   64*   --    --    ALT  60*   --   65*   --   75*   --    --    BILT  1.2   --   1.0

## 2018-07-09 NOTE — PROGRESS NOTES
[facility]    Progress Note    Norman Ramesh Patient Status:  Inpatient    1925 MRN [id number]   Location [facility] 5SW/SE Attending Hema Odell MD   Hosp Day # 21 PCP REMI MCCOLLUM, DO, DO     Subjective:     Pt feels strong possible cause for the aspiration.   Patient's been worked up and treated for possible aspiration, though pt states swallowing well now  Protonix for hiatal hernia      Cont observation and medical tx for pancreatitis for present time, clinically and labs a 161*   --   175*   --    AST  55*   --   57*   --   64*   --    ALT  60*   --   65*   --   75*   --    BILT  1.2   --   1.0   --   1.0   --    TP  5.7*   --   5.7*   --   6.4   --     < > = values in this interval not displayed.

## 2018-07-10 ENCOUNTER — APPOINTMENT (OUTPATIENT)
Dept: GENERAL RADIOLOGY | Facility: HOSPITAL | Age: 83
DRG: 438 | End: 2018-07-10
Attending: HOSPITALIST
Payer: MEDICARE

## 2018-07-10 VITALS
TEMPERATURE: 98 F | RESPIRATION RATE: 20 BRPM | HEIGHT: 62.5 IN | DIASTOLIC BLOOD PRESSURE: 70 MMHG | WEIGHT: 167.31 LBS | BODY MASS INDEX: 30.02 KG/M2 | HEART RATE: 90 BPM | OXYGEN SATURATION: 92 % | SYSTOLIC BLOOD PRESSURE: 150 MMHG

## 2018-07-10 PROCEDURE — 94640 AIRWAY INHALATION TREATMENT: CPT

## 2018-07-10 PROCEDURE — A4216 STERILE WATER/SALINE, 10 ML: HCPCS | Performed by: INTERNAL MEDICINE

## 2018-07-10 PROCEDURE — A4216 STERILE WATER/SALINE, 10 ML: HCPCS

## 2018-07-10 PROCEDURE — 74018 RADEX ABDOMEN 1 VIEW: CPT | Performed by: HOSPITALIST

## 2018-07-10 RX ORDER — 0.9 % SODIUM CHLORIDE 0.9 %
10 VIAL (ML) INJECTION
Qty: 20 SYRINGE | Refills: 2 | Status: SHIPPED | OUTPATIENT
Start: 2018-07-12 | End: 2018-07-10

## 2018-07-10 RX ORDER — 0.9 % SODIUM CHLORIDE 0.9 %
10 VIAL (ML) INJECTION
Qty: 20 SYRINGE | Refills: 2 | Status: SHIPPED | OUTPATIENT
Start: 2018-07-12

## 2018-07-10 RX ORDER — 0.9 % SODIUM CHLORIDE 0.9 %
VIAL (ML) INJECTION
Status: COMPLETED
Start: 2018-07-10 | End: 2018-07-10

## 2018-07-10 NOTE — DISCHARGE SUMMARY
[facility] Internal Medicine Discharge Summary   Patient ID:  Enid Connolly  [id number]  8 year old  12/30/1925    Admit date: 6/16/2018    Discharge date and time:  7/10/18    Attending Physician: Ben Wagner MD     Primary Care Physician: EVAN Lopez medications    ferrous sulfate 325 (65 FE) MG Tbec  Take 1 tablet (325 mg total) by mouth 2 (two) times daily with meals. furosemide 20 MG Tabs  Commonly known as:  LASIX  Take 1 tablet (20 mg total) by mouth daily.      Metoprolol Tartrate 50 MG Tabs stomach/transverse colon but pt without CP or correlating sx of obstruction.  GI and Gen surg following, MRCP shows CBD stone, acute fozia, and acute pancreatitis, s/p ERCP with removal of CBD stone, mucosal tear, post procedure with Afib with RVR and tx to contributing to above  - dc on home O2 per Pulm     Anemia  - hgb up and down  - some reported melena 7/2  - hgb 8.2->8.0  - repeat labs with [facility]     Toe discoloration/blue toe syndrome  - seen by vascular surgery, suspect microemboli from aorta  -non painf SURGERY  IP CONSULT TO SOCIAL WORK  IP CONSULT TO SOCIAL WORK    Radiology: Us Gallbladder (cpt=76705)    Result Date: 7/2/2018  PROCEDURE: US GALLBLADDER (URA=[id number])  COMPARISON: [facility], MRI ABDOMEN+MRCP (RJS=[id number]), 6/17/2018, 14:59. echoes are present, compatible with sludge. Mild wall thickening measures up to 0.4 cm. There is a small quantity of pericholecystic fluid. Sonographic Harp's sign was elicited.  BILE DUCTS:   Positive for dilatation, without sonographically discernible o stent noted on final images. OTHER: Negative. CONCLUSION:  1. Small round intraluminal filling defect in the distal common bile duct which resolved by the end of the procedure. 2. Pancreatic duct stent placement.      Dictated by (CST): Otoniel Loomis peripancreatic fluid collection. No pancreatic ductal dilation. OTHER FINDINGS:  LUNG BASES: There is a large retrocardiac hiatal hernia with near-complete intrathoracic herniation of the stomach and inclusion of a long segment of the transverse colon.  BRIAN well-defined/drainable peripancreatic fluid collection. Please note that assessment for pancreatic necrosis and adjacent vascular compromise is not possible without benefit of intravenous contrast. 4. Mild to moderate fatty liver.  5. Probable bilateral ulysses pericholecystic fluid. 2. Normal caliber biliary tree. 3. Mild hepatic steatosis.      DICTATED BY (CST): Sidra Perez MD ON 6/20/2018 AT 11:20     APPROVED BY (CST): Sidra Perez MD ON 6/20/2018 AT 11:29          Ct Chest+abdomen+pelvis(cpt=71250/63759 calcifications of the right breast are noted. No axillary mass or lymphadenopathy. LIVER: Enlarged, measuring 18.6 cm craniocaudally. No atrophy, abnormal density, or significant focal lesion is identified within the parameters of this noncontrast study. dextroscoliosis of the lower thoracic spine. The thoracic kyphosis is accentuated. 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   Degenerative changes of the shoulders [facility], CT CHEST ABDOMEN PELVIS (CPT=71250/81055), 6/16/2018, 23:32. [facility], MRI ABDOMEN+MRCP (UGP=[id number]), 6/17/2018, 14:59. [facility], CT CHEST ABDOMEN (PTO=45065/25105), 6/18/2018, 23:34.   [facility] calculi are identified. There is no hydronephrosis or hydroureter. Nonspecific bilateral perinephric stranding, which appears increased since prior. GI/MESENTERY:  There is no evidence of bowel obstruction.  There is a large hiatal hernia, which contains th prior. In conjunction with anasarca, findings may reflect component of fluid overload. 4. Progressive air space disease involving both lower lobes with intrinsic air bronchograms. Consider atelectasis or pneumonia/aspiration.  5. Large hiatal hernia contain INDICATIONS: Right upper quadrant pain, acute cholecystitis status post colostomy  tube and biliary stent .   cholecystomy tube pulled out by patient  TECHNIQUE: Hepatobiliary imaging was performed after the injection of 7.9 millicuries of [id number] Ch GALLBLADDER: Abnormal.  Imaging was performed for two hours with no gallbladder visualization. INTESTINE: Normal, with evidence of tracer by the 30 minute image. This is confirmed on subsequent images.        CONCLUSION:  Abnormal exam which demonstrates present in the coronary vessels. VASCULATURE: The thoracic aorta has unremarkable configuration without aneurysmal dilatation. Extensive atherosclerotic vascular calcifications are seen about the aortic arch.  The main pulmonary artery trunk is borderline i No perigastric pneumoperitoneum is seen. A large quantity of mesenteric fat protrudes through the hernia, and there is a long segment of the transverse colon extending through the hernia has well.   Central mesenteric edema is noted, likely related to perip with hematologic parameters. 11. Scattered bilateral atelectasis is seen throughout the lungs. 12. Lesser incidental findings as above. A preliminary report was issued by the [address] [place] [address] Radiology teleradiology service. There are no major discrepancies. COMPARISON: [facility], CT CHEST ABDOMEN (CPT=74150/[id number]), 6/18/2018, 23:34. [facility], XR CHEST AP PORTABLE (CPT=71045), 6/19/2018, 17:06.   INDICATIONS: Hypoxia and follow up bibasilar pleural effusion  TECHNIQUE:   [facility], XR CHEST PA + LAT CHEST (CPT=71020), 1/25/2018, 14:48. INDICATIONS: Left chest pain, loose stool and abdominal pain x1 week. TECHNIQUE:   Single view.    FINDINGS:  COMMENT: Overlying wires and treatment artifacts obscure fine recorded in the electronic medical records. Fluoroscopy time: 1.2 minutes/10 mGy FINDINGS: Following discussion of the indications, risks, benefits and alternatives to percutaneous cholecystostomy, written informed consent was obtained.   The right upper FLUOROSCOPY: 0.3 min   FINDINGS: Following discussion of the indications, risks, benefits and alternatives to percutaneous cholecystostomy, written informed consent from the patient's power of  was obtained.    The patient was placed in supine posit No acute distress, alert and orientedx3  Lungs Clear  Heart Regular  Abdomen drain in place, Otherwise benign    Total Time Coordinating Care: > than 30 minutes    Patient had opportunity to ask questions and state understand and agree with therapeutic

## 2018-07-10 NOTE — PROGRESS NOTES
TPA RIGHT UPPER ARM       At 0230  Noted purple port was occluded. alteplase administered . patienrt denies any pain or discomfort. At 0330 checked for patency  And was able to flushed and draw blood ,pt denies discomfort.

## 2018-07-10 NOTE — PLAN OF CARE
Problem: Patient Centered Care  Goal: Patient preferences are identified and integrated in the patient's plan of care  Interventions:  - What would you like us to know as we care for you?  Lives at home with Son and family  - Provide timely, complete, and a tolerated  - Nasogastric tube to low intermittent suction as ordered  - Evaluate effectiveness of ordered antiemetic medications  - Provide nonpharmacologic comfort measures as appropriate  - Advance diet as tolerated, if ordered  - Obtain nutritional cons increased intracranial pressure  - Maintain blood pressure and fluid volume within ordered parameters to optimize cerebral perfusion and minimize risk of hemorrhage  - Monitor temperature, glucose, and sodium.  Initiate appropriate interventions as ordered

## 2018-07-10 NOTE — PLAN OF CARE
Patient's oxygen saturation on 3L at rest 93%.  On room air saturation 87% at rest. Placed back on oxygen with saturation back up to 94% on 3L/NC

## 2018-07-10 NOTE — PLAN OF CARE
Problem: Patient Centered Care  Goal: Patient preferences are identified and integrated in the patient's plan of care  Interventions:  - What would you like us to know as we care for you?  Lives at home with Son and family  - Provide timely, complete, and a Minimal or absence of nausea and vomiting  INTERVENTIONS:  - Maintain adequate hydration with IV or PO as ordered and tolerated  - Nasogastric tube to low intermittent suction as ordered  - Evaluate effectiveness of ordered antiemetic medications  - Provid safety including physical limitations  - Instruct pt to call for assistance with activity based on assessment  - Modify environment to reduce risk of injury  - Provide assistive devices as appropriate  - Consider OT/PT consult to assist with strengthening/ patient  - Hold pressure on venipuncture sites to achieve adequate hemostasis  - Assess for signs and symptoms of internal bleeding  - Monitor lab trends   Outcome: Adequate for Discharge      Comments: Discharged instructions discussed with patient, Chucho Hernandez

## 2018-07-10 NOTE — PROGRESS NOTES
[facility]    Progress Note    Elaina Webb Patient Status:  Inpatient    1925 MRN [id number]   Location [facility] 5SW/SE Attending Flor Andrew MD   Hosp Day # 23 PCP REMI MCCOLLUM DO, DO     Subjective:     Sitting up in c now  Protonix for hiatal hernia    Gallbladder  Pt comfortable, can eat well  However, gallbladder drain removed by accident by family member 7/5  hepatobiliary scan showed non vis GB  Gallbladder drain replaced  D/w Salud Peck from IR -pt to have drain flush 64*   --    --    ALT  65*   --   75*   --    --    BILT  1.0   --   1.0   --    --    TP  5.7*   --   6.4   --    --     < > = values in this interval not displayed.        Xr Abdomen (1 View) (cpt=74018)    Result Date: 7/10/2018  CONCLUSION:   * Pancre

## 2018-07-16 ENCOUNTER — LAB REQUISITION (OUTPATIENT)
Dept: LAB | Facility: HOSPITAL | Age: 83
End: 2018-07-16
Payer: MEDICARE

## 2018-07-16 DIAGNOSIS — N17.9 ACUTE KIDNEY FAILURE (HCC): ICD-10-CM

## 2018-07-16 DIAGNOSIS — K80.50 CALCULUS OF BILE DUCT WITHOUT CHOLANGITIS OR CHOLECYSTITIS WITHOUT OBSTRUCTION: ICD-10-CM

## 2018-07-16 DIAGNOSIS — K80.70 CALCULUS OF GALLBLADDER AND BILE DUCT W/O CHOLECYSTITIS OR OBSTRUCTION: ICD-10-CM

## 2018-07-16 DIAGNOSIS — I48.91 ATRIAL FIBRILLATION (HCC): ICD-10-CM

## 2018-07-16 LAB
ALBUMIN SERPL-MCNC: 2.3 G/DL (ref 3.5–4.8)
ALP LIVER SERPL-CCNC: 236 U/L (ref 55–142)
ALT SERPL-CCNC: 63 U/L (ref 14–54)
AST SERPL-CCNC: 52 U/L (ref 15–41)
BASOPHILS # BLD AUTO: 0.03 X10(3) UL (ref 0–0.1)
BASOPHILS NFR BLD AUTO: 0.8 %
BILIRUB SERPL-MCNC: 0.6 MG/DL (ref 0.1–2)
BUN BLD-MCNC: 17 MG/DL (ref 8–20)
CALCIUM BLD-MCNC: 8.5 MG/DL (ref 8.3–10.3)
CHLORIDE: 101 MMOL/L (ref 101–111)
CO2: 33 MMOL/L (ref 22–32)
CREAT BLD-MCNC: 1.07 MG/DL (ref 0.55–1.02)
EOSINOPHIL # BLD AUTO: 0.15 X10(3) UL (ref 0–0.3)
EOSINOPHIL NFR BLD AUTO: 4 %
ERYTHROCYTE [DISTWIDTH] IN BLOOD BY AUTOMATED COUNT: 16.9 % (ref 11.5–16)
GLUCOSE BLD-MCNC: 163 MG/DL (ref 70–99)
HCT VFR BLD AUTO: 30.4 % (ref 34–50)
HGB BLD-MCNC: 9.3 G/DL (ref 12–16)
IMMATURE GRANULOCYTE COUNT: 0.02 X10(3) UL (ref 0–1)
IMMATURE GRANULOCYTE RATIO %: 0.5 %
LYMPHOCYTES # BLD AUTO: 0.77 X10(3) UL (ref 0.9–4)
LYMPHOCYTES NFR BLD AUTO: 20.4 %
M PROTEIN MFR SERPL ELPH: 6.7 G/DL (ref 6.1–8.3)
MCH RBC QN AUTO: 31.1 PG (ref 27–33.2)
MCHC RBC AUTO-ENTMCNC: 30.6 G/DL (ref 31–37)
MCV RBC AUTO: 101.7 FL (ref 81–100)
MONOCYTES # BLD AUTO: 0.32 X10(3) UL (ref 0.1–1)
MONOCYTES NFR BLD AUTO: 8.5 %
NEUTROPHIL ABS PRELIM: 2.49 X10 (3) UL (ref 1.3–6.7)
NEUTROPHILS # BLD AUTO: 2.49 X10(3) UL (ref 1.3–6.7)
NEUTROPHILS NFR BLD AUTO: 65.8 %
PLATELET # BLD AUTO: 169 10(3)UL (ref 150–450)
POTASSIUM SERPL-SCNC: 3.5 MMOL/L (ref 3.6–5.1)
RBC # BLD AUTO: 2.99 X10(6)UL (ref 3.8–5.1)
RED CELL DISTRIBUTION WIDTH-SD: 62.1 FL (ref 35.1–46.3)
SODIUM SERPL-SCNC: 143 MMOL/L (ref 136–144)
WBC # BLD AUTO: 3.8 X10(3) UL (ref 4–13)

## 2018-07-16 PROCEDURE — 80053 COMPREHEN METABOLIC PANEL: CPT | Performed by: FAMILY MEDICINE

## 2018-07-16 PROCEDURE — 85025 COMPLETE CBC W/AUTO DIFF WBC: CPT | Performed by: FAMILY MEDICINE

## 2018-07-25 ENCOUNTER — HOSPITAL (OUTPATIENT)
Dept: OTHER | Age: 83
End: 2018-07-25

## 2018-07-25 ENCOUNTER — CHARTING TRANS (OUTPATIENT)
Dept: OTHER | Age: 83
End: 2018-07-25

## 2018-07-25 ENCOUNTER — LAB SERVICES (OUTPATIENT)
Dept: OTHER | Age: 83
End: 2018-07-25

## 2018-07-25 ENCOUNTER — IMAGING SERVICES (OUTPATIENT)
Dept: OTHER | Age: 83
End: 2018-07-25

## 2018-07-26 ENCOUNTER — CHARTING TRANS (OUTPATIENT)
Dept: OTHER | Age: 83
End: 2018-07-26

## 2018-07-26 ENCOUNTER — HOSPITAL ENCOUNTER (OUTPATIENT)
Dept: CT IMAGING | Facility: HOSPITAL | Age: 83
Discharge: HOME | End: 2018-07-26
Attending: FAMILY MEDICINE
Payer: MEDICARE

## 2018-07-26 ENCOUNTER — HOSPITAL ENCOUNTER (OUTPATIENT)
Dept: GENERAL RADIOLOGY | Facility: HOSPITAL | Age: 83
Discharge: HOME | End: 2018-07-26
Attending: INTERNAL MEDICINE
Payer: MEDICARE

## 2018-07-26 DIAGNOSIS — R06.02 SHORTNESS OF BREATH: ICD-10-CM

## 2018-07-26 DIAGNOSIS — K80.20 CHOLELITHIASIS: ICD-10-CM

## 2018-07-26 PROBLEM — J96.20 ACUTE ON CHRONIC RESPIRATORY FAILURE (HCC): Status: ACTIVE | Noted: 2018-07-26

## 2018-07-26 LAB — CREAT BLD-MCNC: 1 MG/DL (ref 0.5–1.5)

## 2018-07-26 PROCEDURE — 71046 X-RAY EXAM CHEST 2 VIEWS: CPT | Performed by: INTERNAL MEDICINE

## 2018-07-26 PROCEDURE — 74160 CT ABDOMEN W/CONTRAST: CPT | Performed by: FAMILY MEDICINE

## 2018-07-26 PROCEDURE — 82565 ASSAY OF CREATININE: CPT

## 2018-07-27 NOTE — PROGRESS NOTES
Call patient - CXR with improved findings. Improved pulm edema and pleural effusions. Stable hiatal hernia.  Follow-up in 3 months with simple stress test prior to visit

## 2018-07-31 ENCOUNTER — CHARTING TRANS (OUTPATIENT)
Dept: OTHER | Age: 83
End: 2018-07-31

## 2018-07-31 LAB
ALBUMIN SERPL-MCNC: 2.9 G/DL (ref 3.6–5.1)
ALBUMIN/GLOB SERPL: 0.7 (ref 1–2.4)
ALP SERPL-CCNC: 294 UNITS/L (ref 45–117)
ALT SERPL-CCNC: 53 UNITS/L
ANION GAP SERPL CALC-SCNC: 14 MMOL/L (ref 10–20)
AST SERPL-CCNC: 52 UNITS/L
BASOPHILS # BLD: 0 K/MCL (ref 0–0.3)
BASOPHILS NFR BLD: 1 %
BILIRUB SERPL-MCNC: 0.6 MG/DL (ref 0.2–1)
BUN SERPL-MCNC: 16 MG/DL (ref 6–20)
BUN/CREAT SERPL: 18 (ref 7–25)
CALCIUM SERPL-MCNC: 8.7 MG/DL (ref 8.4–10.2)
CHLORIDE SERPL-SCNC: 100 MMOL/L (ref 98–107)
CO2 SERPL-SCNC: 31 MMOL/L (ref 21–32)
CREAT SERPL-MCNC: 0.88 MG/DL (ref 0.51–0.95)
DIFFERENTIAL METHOD BLD: ABNORMAL
EOSINOPHIL # BLD: 0.2 K/MCL (ref 0.1–0.5)
EOSINOPHIL NFR BLD: 4 %
ERYTHROCYTE [DISTWIDTH] IN BLOOD: 16.6 % (ref 11–15)
FERRITIN SERPL-MCNC: 273 NG/ML (ref 8–252)
FOLATE SERPL-MCNC: >24 NG/ML
GLOBULIN SER-MCNC: 4.2 G/DL (ref 2–4)
GLUCOSE SERPL-MCNC: 140 MG/DL (ref 65–99)
HEMATOCRIT: 34.2 % (ref 36–46.5)
HEMOGLOBIN: 10.2 G/DL (ref 12–15.5)
IMM GRANULOCYTES # BLD AUTO: 0 K/MCL (ref 0–0.2)
IMM GRANULOCYTES NFR BLD: 0 %
IRON SATN MFR SERPL: 18 % (ref 15–45)
IRON SERPL-MCNC: 44 MCG/DL (ref 50–170)
LENGTH OF FAST TIME PATIENT: 0 HRS
LYMPHOCYTES # BLD: 1.6 K/MCL (ref 1–4)
LYMPHOCYTES NFR BLD: 34 %
MEAN CORPUSCULAR HEMOGLOBIN: 30.8 PG (ref 26–34)
MEAN CORPUSCULAR HGB CONC: 29.8 G/DL (ref 32–36.5)
MEAN CORPUSCULAR VOLUME: 103.3 FL (ref 78–100)
MONOCYTES # BLD: 0.4 K/MCL (ref 0.3–0.9)
MONOCYTES NFR BLD: 8 %
NEUTROPHILS # BLD: 2.4 K/MCL (ref 1.8–7.7)
NEUTROPHILS NFR BLD: 53 %
NRBC (NRBCRE): 0 /100 WBC
PLATELET COUNT: 214 K/MCL (ref 140–450)
POTASSIUM SERPL-SCNC: 3.4 MMOL/L (ref 3.4–5.1)
RED CELL COUNT: 3.31 MIL/MCL (ref 4–5.2)
SODIUM SERPL-SCNC: 142 MMOL/L (ref 135–145)
TIBC SERPL-MCNC: 242 MCG/DL (ref 250–450)
TOTAL PROTEIN: 7.1 G/DL (ref 6.4–8.2)
VIT B12 SERPL-MCNC: 1168 PG/ML (ref 211–911)
WHITE BLOOD COUNT: 4.6 K/MCL (ref 4.2–11)

## 2018-08-01 ENCOUNTER — HOSPITAL ENCOUNTER (OUTPATIENT)
Dept: NUCLEAR MEDICINE | Facility: HOSPITAL | Age: 83
Discharge: HOME | End: 2018-08-01
Attending: CLINICAL NURSE SPECIALIST
Payer: MEDICARE

## 2018-08-01 DIAGNOSIS — K81.9 CHOLECYSTITIS: ICD-10-CM

## 2018-08-01 PROCEDURE — 78226 HEPATOBILIARY SYSTEM IMAGING: CPT | Performed by: CLINICAL NURSE SPECIALIST

## 2018-08-23 ENCOUNTER — SURGERY (OUTPATIENT)
Age: 83
End: 2018-08-23

## 2018-08-23 ENCOUNTER — ANESTHESIA EVENT (OUTPATIENT)
Dept: ENDOSCOPY | Facility: HOSPITAL | Age: 83
End: 2018-08-23

## 2018-08-23 ENCOUNTER — ANESTHESIA (OUTPATIENT)
Dept: ENDOSCOPY | Facility: HOSPITAL | Age: 83
End: 2018-08-23

## 2018-08-23 ENCOUNTER — HOSPITAL ENCOUNTER (OUTPATIENT)
Facility: HOSPITAL | Age: 83
Setting detail: HOSPITAL OUTPATIENT SURGERY
Discharge: HOME | End: 2018-08-23
Attending: INTERNAL MEDICINE | Admitting: INTERNAL MEDICINE
Payer: MEDICARE

## 2018-08-23 VITALS
BODY MASS INDEX: 28.52 KG/M2 | WEIGHT: 155 LBS | OXYGEN SATURATION: 96 % | DIASTOLIC BLOOD PRESSURE: 73 MMHG | RESPIRATION RATE: 16 BRPM | HEART RATE: 68 BPM | HEIGHT: 62 IN | SYSTOLIC BLOOD PRESSURE: 145 MMHG

## 2018-08-23 DIAGNOSIS — T85.528S: ICD-10-CM

## 2018-08-23 DIAGNOSIS — K80.50 CHOLEDOCHOLITHIASIS: ICD-10-CM

## 2018-08-23 PROCEDURE — 0DC68ZZ EXTIRPATION OF MATTER FROM STOMACH, VIA NATURAL OR ARTIFICIAL OPENING ENDOSCOPIC: ICD-10-PCS | Performed by: INTERNAL MEDICINE

## 2018-08-23 RX ORDER — LIDOCAINE HYDROCHLORIDE 10 MG/ML
INJECTION, SOLUTION EPIDURAL; INFILTRATION; INTRACAUDAL; PERINEURAL AS NEEDED
Status: DISCONTINUED | OUTPATIENT
Start: 2018-08-23 | End: 2018-08-23 | Stop reason: SURG

## 2018-08-23 RX ORDER — SODIUM CHLORIDE, SODIUM LACTATE, POTASSIUM CHLORIDE, CALCIUM CHLORIDE 600; 310; 30; 20 MG/100ML; MG/100ML; MG/100ML; MG/100ML
INJECTION, SOLUTION INTRAVENOUS CONTINUOUS PRN
Status: DISCONTINUED | OUTPATIENT
Start: 2018-08-23 | End: 2018-08-23 | Stop reason: SURG

## 2018-08-23 RX ADMIN — LIDOCAINE HYDROCHLORIDE 40 MG: 10 INJECTION, SOLUTION EPIDURAL; INFILTRATION; INTRACAUDAL; PERINEURAL at 10:23:00

## 2018-08-23 RX ADMIN — SODIUM CHLORIDE, SODIUM LACTATE, POTASSIUM CHLORIDE, CALCIUM CHLORIDE: 600; 310; 30; 20 INJECTION, SOLUTION INTRAVENOUS at 10:18:00

## 2018-08-23 RX ADMIN — SODIUM CHLORIDE, SODIUM LACTATE, POTASSIUM CHLORIDE, CALCIUM CHLORIDE: 600; 310; 30; 20 INJECTION, SOLUTION INTRAVENOUS at 10:30:00

## 2018-08-23 NOTE — ANESTHESIA PREPROCEDURE EVALUATION
Anesthesia PreOp Note    HPI:     Mari Corrales is a 80year old female who presents for preoperative consultation requested by: Emmanuel Georges MD    Date of Surgery: 8/23/2018    Procedure(s):  ESOPHAGOGASTRODUODENOSCOPY (EGD)  Indication: Choledochol mg by mouth every morning before breakfast. Disp:  Rfl:  8/22/2018   Irbesartan 300 MG Oral Tab Take 300 mg by mouth nightly. Disp:  Rfl:  8/22/2018 at 0900   Vitamin D3 2000 units Oral Cap Take 2,000 Units by mouth daily.  Disp:  Rfl:  8/22/2018   aspirin (155 lb)    Height: 1.575 m (5' 2\")         Anesthesia ROS/Med Hx and Physical Exam     Patient summary reviewed and Nursing notes reviewed    No history of anesthetic complications   Airway   Mallampati: I  TM distance: >3 FB  Neck ROM: full  Dental - no

## 2018-08-23 NOTE — H&P
[facility] Department of  Gastroenterology  Update Health History :        Teresa Gordon  female   Chalino Arreguin MD     [id number]  12/30/1925 Primary Care Physician  REMI MCCOLLUM DO, DO        HPI :  Retained pancreatic stent after ERCP she is Multiple Vitamins-Minerals (ICAPS AREDS 2) Oral Cap Take by mouth 2 (two) times daily. Disp:  Rfl:        Review of Symptoms:  A comprehensive 10 point review of systems was completed.     /63   Pulse 62   Resp 14   Ht 5' 2\" (1.575 m)   Wt 155 lb

## 2018-08-23 NOTE — ANESTHESIA POSTPROCEDURE EVALUATION
Patient:  Ynes Rodriguez    Procedure Summary     Date:  08/23/18 Room / Location:  [facility] ENDOSCOPY 01 / [facility] ENDOSCOPY    Anesthesia Start:  5696 Anesthesia Stop:  9036    Procedure:  ESOPHAGOGASTRODUODENOSCOPY (EGD) (N/A ) Diagnosis:       Choledocholithias

## 2018-08-23 NOTE — OPERATIVE REPORT
Procedure report    PATIENT NAME: Janeth Denny  MRN: [id number]  DATE OF OPERATION: 8/23/2018    PREOPERATIVE DIAGNOSIS:   1. Retained pancreatic stent  POSTOPERATIVE DIAGNOSES:  1. Removal of PD stent  2.  Large paraesophageal hernia  PROCEDURE PERFORM

## 2018-08-27 PROBLEM — K80.00 ACUTE CHOLECYSTITIS DUE TO BILIARY CALCULUS: Status: ACTIVE | Noted: 2018-08-27

## 2018-10-02 ENCOUNTER — LAB SERVICES (OUTPATIENT)
Dept: OTHER | Age: 83
End: 2018-10-02

## 2018-10-02 ENCOUNTER — IMAGING SERVICES (OUTPATIENT)
Dept: OTHER | Age: 83
End: 2018-10-02

## 2018-10-02 ENCOUNTER — CHARTING TRANS (OUTPATIENT)
Dept: OTHER | Age: 83
End: 2018-10-02

## 2018-10-02 ENCOUNTER — HOSPITAL (OUTPATIENT)
Dept: OTHER | Age: 83
End: 2018-10-02

## 2018-10-04 ENCOUNTER — CHARTING TRANS (OUTPATIENT)
Dept: OTHER | Age: 83
End: 2018-10-04

## 2018-10-05 LAB
ANION GAP SERPL CALC-SCNC: 14 MMOL/L (ref 10–20)
BASOPHILS # BLD: 0.1 K/MCL (ref 0–0.3)
BASOPHILS NFR BLD: 1 %
BUN SERPL-MCNC: 22 MG/DL (ref 6–20)
BUN/CREAT SERPL: 21 (ref 7–25)
CALCIUM SERPL-MCNC: 9.2 MG/DL (ref 8.4–10.2)
CHLORIDE SERPL-SCNC: 101 MMOL/L (ref 98–107)
CO2 SERPL-SCNC: 31 MMOL/L (ref 21–32)
CREAT SERPL-MCNC: 1.06 MG/DL (ref 0.51–0.95)
DIFFERENTIAL METHOD BLD: NORMAL
EOSINOPHIL # BLD: 0.1 K/MCL (ref 0.1–0.5)
EOSINOPHIL NFR BLD: 3 %
ERYTHROCYTE [DISTWIDTH] IN BLOOD: 14.3 % (ref 11–15)
GLUCOSE SERPL-MCNC: 113 MG/DL (ref 65–99)
HEMATOCRIT: 39 % (ref 36–46.5)
HEMOGLOBIN: 13 G/DL (ref 12–15.5)
IMM GRANULOCYTES # BLD AUTO: 0 K/MCL (ref 0–0.2)
IMM GRANULOCYTES NFR BLD: 0 %
LENGTH OF FAST TIME PATIENT: ABNORMAL HRS
LYMPHOCYTES # BLD: 1.9 K/MCL (ref 1–4)
LYMPHOCYTES NFR BLD: 36 %
MEAN CORPUSCULAR HEMOGLOBIN: 29.2 PG (ref 26–34)
MEAN CORPUSCULAR HGB CONC: 33.3 G/DL (ref 32–36.5)
MEAN CORPUSCULAR VOLUME: 87.6 FL (ref 78–100)
MONOCYTES # BLD: 0.4 K/MCL (ref 0.3–0.9)
MONOCYTES NFR BLD: 7 %
NEUTROPHILS # BLD: 2.8 K/MCL (ref 1.8–7.7)
NEUTROPHILS NFR BLD: 53 %
NRBC (NRBCRE): 0 /100 WBC
PLATELET COUNT: 168 K/MCL (ref 140–450)
POTASSIUM SERPL-SCNC: 4.1 MMOL/L (ref 3.4–5.1)
RED CELL COUNT: 4.45 MIL/MCL (ref 4–5.2)
SODIUM SERPL-SCNC: 142 MMOL/L (ref 135–145)
WHITE BLOOD COUNT: 5.3 K/MCL (ref 4.2–11)

## 2018-10-22 ENCOUNTER — LAB SERVICES (OUTPATIENT)
Dept: OTHER | Age: 83
End: 2018-10-22

## 2018-10-22 ENCOUNTER — CHARTING TRANS (OUTPATIENT)
Dept: OTHER | Age: 83
End: 2018-10-22

## 2018-10-23 ENCOUNTER — CHARTING TRANS (OUTPATIENT)
Dept: OTHER | Age: 83
End: 2018-10-23

## 2018-10-31 VITALS
RESPIRATION RATE: 20 BRPM | HEIGHT: 62 IN | SYSTOLIC BLOOD PRESSURE: 104 MMHG | HEART RATE: 76 BPM | TEMPERATURE: 98.3 F | DIASTOLIC BLOOD PRESSURE: 70 MMHG | OXYGEN SATURATION: 94 %

## 2018-11-01 VITALS
TEMPERATURE: 97.3 F | HEART RATE: 88 BPM | BODY MASS INDEX: 28.47 KG/M2 | OXYGEN SATURATION: 95 % | WEIGHT: 154.7 LBS | RESPIRATION RATE: 22 BRPM | HEIGHT: 62 IN

## 2018-11-01 VITALS
RESPIRATION RATE: 16 BRPM | SYSTOLIC BLOOD PRESSURE: 126 MMHG | OXYGEN SATURATION: 98 % | TEMPERATURE: 96.7 F | BODY MASS INDEX: 22.66 KG/M2 | HEART RATE: 69 BPM | HEIGHT: 69 IN | DIASTOLIC BLOOD PRESSURE: 72 MMHG | WEIGHT: 153 LBS

## 2018-11-02 VITALS
WEIGHT: 150.9 LBS | SYSTOLIC BLOOD PRESSURE: 136 MMHG | RESPIRATION RATE: 16 BRPM | OXYGEN SATURATION: 97 % | HEART RATE: 68 BPM | TEMPERATURE: 96.3 F | DIASTOLIC BLOOD PRESSURE: 80 MMHG

## 2018-11-02 VITALS
OXYGEN SATURATION: 97 % | DIASTOLIC BLOOD PRESSURE: 80 MMHG | SYSTOLIC BLOOD PRESSURE: 114 MMHG | TEMPERATURE: 98 F | HEART RATE: 70 BPM | RESPIRATION RATE: 16 BRPM | BODY MASS INDEX: 22.51 KG/M2 | HEIGHT: 69 IN | WEIGHT: 152 LBS

## 2018-11-02 VITALS
WEIGHT: 152.56 LBS | RESPIRATION RATE: 18 BRPM | BODY MASS INDEX: 28.07 KG/M2 | DIASTOLIC BLOOD PRESSURE: 62 MMHG | HEIGHT: 62 IN | TEMPERATURE: 96.3 F | OXYGEN SATURATION: 93 % | HEART RATE: 80 BPM | SYSTOLIC BLOOD PRESSURE: 124 MMHG

## 2018-11-04 VITALS
DIASTOLIC BLOOD PRESSURE: 82 MMHG | HEIGHT: 62 IN | BODY MASS INDEX: 27.6 KG/M2 | TEMPERATURE: 96.4 F | RESPIRATION RATE: 16 BRPM | OXYGEN SATURATION: 98 % | SYSTOLIC BLOOD PRESSURE: 120 MMHG | HEART RATE: 84 BPM | WEIGHT: 150 LBS

## 2018-11-05 VITALS
DIASTOLIC BLOOD PRESSURE: 78 MMHG | OXYGEN SATURATION: 96 % | WEIGHT: 152.78 LBS | TEMPERATURE: 96.8 F | SYSTOLIC BLOOD PRESSURE: 130 MMHG | HEART RATE: 88 BPM | BODY MASS INDEX: 28.11 KG/M2 | RESPIRATION RATE: 16 BRPM | HEIGHT: 62 IN

## 2018-11-06 LAB
ANION GAP SERPL CALC-SCNC: 18 MMOL/L (ref 10–20)
BUN SERPL-MCNC: 22 MG/DL (ref 6–20)
BUN/CREAT SERPL: 21 (ref 7–25)
CALCIUM SERPL-MCNC: 8.9 MG/DL (ref 8.4–10.2)
CHLORIDE SERPL-SCNC: 98 MMOL/L (ref 98–107)
CO2 SERPL-SCNC: 29 MMOL/L (ref 21–32)
CREAT SERPL-MCNC: 1.04 MG/DL (ref 0.51–0.95)
GLUCOSE SERPL-MCNC: 138 MG/DL (ref 65–99)
LENGTH OF FAST TIME PATIENT: ABNORMAL HRS
POTASSIUM SERPL-SCNC: 4.1 MMOL/L (ref 3.4–5.1)
SODIUM SERPL-SCNC: 141 MMOL/L (ref 135–145)

## 2018-11-27 VITALS
TEMPERATURE: 97.2 F | BODY MASS INDEX: 27.6 KG/M2 | RESPIRATION RATE: 16 BRPM | HEIGHT: 62 IN | WEIGHT: 150 LBS | DIASTOLIC BLOOD PRESSURE: 68 MMHG | HEART RATE: 72 BPM | SYSTOLIC BLOOD PRESSURE: 100 MMHG | OXYGEN SATURATION: 93 %

## 2018-11-27 VITALS
HEIGHT: 62 IN | DIASTOLIC BLOOD PRESSURE: 72 MMHG | RESPIRATION RATE: 20 BRPM | WEIGHT: 150 LBS | OXYGEN SATURATION: 94 % | TEMPERATURE: 97.8 F | HEART RATE: 78 BPM | BODY MASS INDEX: 27.6 KG/M2 | SYSTOLIC BLOOD PRESSURE: 114 MMHG

## 2019-01-01 ENCOUNTER — TELEPHONE (OUTPATIENT)
Dept: SCHEDULING | Age: 84
End: 2019-01-01

## 2019-01-01 ENCOUNTER — HOSPITAL (OUTPATIENT)
Dept: OTHER | Age: 84
End: 2019-01-01

## 2019-01-01 ENCOUNTER — TELEPHONE (OUTPATIENT)
Dept: FAMILY MEDICINE | Age: 84
End: 2019-01-01

## 2019-01-01 ENCOUNTER — LAB SERVICES (OUTPATIENT)
Dept: LAB | Age: 84
End: 2019-01-01

## 2019-01-01 ENCOUNTER — DIAGNOSTIC TRANS (OUTPATIENT)
Dept: OTHER | Age: 84
End: 2019-01-01

## 2019-01-01 ENCOUNTER — OFFICE VISIT (OUTPATIENT)
Dept: FAMILY MEDICINE | Age: 84
End: 2019-01-01

## 2019-01-01 VITALS
BODY MASS INDEX: 27.44 KG/M2 | TEMPERATURE: 97.4 F | DIASTOLIC BLOOD PRESSURE: 78 MMHG | HEART RATE: 71 BPM | HEIGHT: 62 IN | RESPIRATION RATE: 16 BRPM | SYSTOLIC BLOOD PRESSURE: 128 MMHG | OXYGEN SATURATION: 95 %

## 2019-01-01 DIAGNOSIS — K21.9 GASTROESOPHAGEAL REFLUX DISEASE WITHOUT ESOPHAGITIS: Primary | ICD-10-CM

## 2019-01-01 DIAGNOSIS — I10 ESSENTIAL HYPERTENSION: ICD-10-CM

## 2019-01-01 DIAGNOSIS — Z23 FLU VACCINE NEED: ICD-10-CM

## 2019-01-01 DIAGNOSIS — K21.9 GASTROESOPHAGEAL REFLUX DISEASE WITHOUT ESOPHAGITIS: ICD-10-CM

## 2019-01-01 DIAGNOSIS — J20.9 BRONCHITIS WITH BRONCHOSPASM: ICD-10-CM

## 2019-01-01 DIAGNOSIS — E78.00 HYPERCHOLESTEROLEMIA: ICD-10-CM

## 2019-01-01 DIAGNOSIS — D72.9 ABNORMAL WBC COUNT: Primary | ICD-10-CM

## 2019-01-01 LAB
ALBUMIN SERPL-MCNC: 3.5 G/DL (ref 3.6–5.1)
ALBUMIN/GLOB SERPL: 1 {RATIO} (ref 1–2.4)
ALP SERPL-CCNC: 142 UNITS/L (ref 45–117)
ALT SERPL-CCNC: 19 UNITS/L
ANION GAP SERPL CALC-SCNC: 12 MMOL/L (ref 10–20)
AST SERPL-CCNC: 22 UNITS/L
BASOPHILS # BLD AUTO: 0 K/MCL (ref 0–0.3)
BASOPHILS NFR BLD AUTO: 1 %
BILIRUB SERPL-MCNC: 0.6 MG/DL (ref 0.2–1)
BUN SERPL-MCNC: 19 MG/DL (ref 6–20)
BUN/CREAT SERPL: 20 (ref 7–25)
CALCIUM SERPL-MCNC: 8.9 MG/DL (ref 8.4–10.2)
CHLORIDE SERPL-SCNC: 100 MMOL/L (ref 98–107)
CHOLEST SERPL-MCNC: 220 MG/DL
CHOLEST/HDLC SERPL: 3.4 {RATIO}
CO2 SERPL-SCNC: 33 MMOL/L (ref 21–32)
CREAT SERPL-MCNC: 0.95 MG/DL (ref 0.51–0.95)
DIFFERENTIAL METHOD BLD: ABNORMAL
EOSINOPHIL # BLD AUTO: 0.1 K/MCL (ref 0.1–0.5)
EOSINOPHIL NFR SPEC: 2 %
ERYTHROCYTE [DISTWIDTH] IN BLOOD: 14.7 % (ref 11–15)
FASTING STATUS PATIENT QL REPORTED: 6 HRS
GLOBULIN SER-MCNC: 3.5 G/DL (ref 2–4)
GLUCOSE SERPL-MCNC: 96 MG/DL (ref 65–99)
HCT VFR BLD CALC: 44.3 % (ref 36–46.5)
HDLC SERPL-MCNC: 65 MG/DL
HGB BLD-MCNC: 13.5 G/DL (ref 12–15.5)
IMM GRANULOCYTES # BLD AUTO: 0 K/MCL (ref 0–0.2)
IMM GRANULOCYTES NFR BLD: 0 %
LDLC SERPL-MCNC: 115 MG/DL
LENGTH OF FAST TIME PATIENT: 6 HRS
LYMPHOCYTES # BLD MANUAL: 1.4 K/MCL (ref 1–4)
LYMPHOCYTES NFR BLD MANUAL: 36 %
MCH RBC QN AUTO: 30.1 PG (ref 26–34)
MCHC RBC AUTO-ENTMCNC: 30.5 G/DL (ref 32–36.5)
MCV RBC AUTO: 98.9 FL (ref 78–100)
MONOCYTES # BLD MANUAL: 0.2 K/MCL (ref 0.3–0.9)
MONOCYTES NFR BLD MANUAL: 5 %
NEUTROPHILS # BLD: 2.2 K/MCL (ref 1.8–7.7)
NEUTROPHILS NFR BLD AUTO: 56 %
NONHDLC SERPL-MCNC: 155 MG/DL
NRBC BLD MANUAL-RTO: 0 /100 WBC
PLATELET # BLD: 137 K/MCL (ref 140–450)
POTASSIUM SERPL-SCNC: 4.3 MMOL/L (ref 3.4–5.1)
PROT SERPL-MCNC: 7 G/DL (ref 6.4–8.2)
RBC # BLD: 4.48 MIL/MCL (ref 4–5.2)
SODIUM SERPL-SCNC: 141 MMOL/L (ref 135–145)
TRIGL SERPL-MCNC: 200 MG/DL
TSH SERPL-ACNC: 2.18 MCUNITS/ML (ref 0.35–5)
WBC # BLD: 3.9 K/MCL (ref 4.2–11)

## 2019-01-01 PROCEDURE — 99214 OFFICE O/P EST MOD 30 MIN: CPT | Performed by: FAMILY MEDICINE

## 2019-01-01 PROCEDURE — 99223 1ST HOSP IP/OBS HIGH 75: CPT | Performed by: INTERNAL MEDICINE

## 2019-01-01 RX ORDER — AZITHROMYCIN 250 MG/1
TABLET, FILM COATED ORAL
Qty: 6 TABLET | Refills: 0 | Status: SHIPPED | OUTPATIENT
Start: 2019-01-01 | End: 2019-01-01

## 2019-01-01 RX ORDER — IRBESARTAN 300 MG/1
300 TABLET ORAL
COMMUNITY
Start: 2017-12-05 | End: 2019-01-01 | Stop reason: SDUPTHER

## 2019-01-01 RX ORDER — FUROSEMIDE 20 MG/1
TABLET ORAL
Qty: 180 TABLET | Refills: 0 | Status: SHIPPED | OUTPATIENT
Start: 2019-01-01 | End: 2019-01-01 | Stop reason: SDUPTHER

## 2019-01-01 RX ORDER — PREDNISONE 20 MG/1
TABLET ORAL
Qty: 21 TABLET | Refills: 0 | Status: SHIPPED | OUTPATIENT
Start: 2019-01-01 | End: 2019-01-01 | Stop reason: SDUPTHER

## 2019-01-01 RX ORDER — PANTOPRAZOLE SODIUM 40 MG/1
40 TABLET, DELAYED RELEASE ORAL DAILY
Qty: 90 TABLET | Refills: 3 | Status: SHIPPED | OUTPATIENT
Start: 2019-01-01

## 2019-01-01 RX ORDER — IRBESARTAN 300 MG/1
300 TABLET ORAL DAILY
Qty: 90 TABLET | Refills: 3 | Status: SHIPPED | OUTPATIENT
Start: 2019-01-01

## 2019-01-01 RX ORDER — AMOXICILLIN 250 MG
CAPSULE ORAL
COMMUNITY
Start: 2017-12-05

## 2019-01-01 RX ORDER — DIPHENHYDRAMINE HYDROCHLORIDE 25 MG/1
TABLET ORAL
COMMUNITY
Start: 2017-12-05

## 2019-01-01 RX ORDER — AMOXICILLIN AND CLAVULANATE POTASSIUM 875; 125 MG/1; MG/1
1 TABLET, FILM COATED ORAL 2 TIMES DAILY
Qty: 20 TABLET | Refills: 0 | Status: SHIPPED | OUTPATIENT
Start: 2019-01-01

## 2019-01-01 RX ORDER — PANTOPRAZOLE SODIUM 40 MG/10ML
40 INJECTION, POWDER, LYOPHILIZED, FOR SOLUTION INTRAVENOUS
COMMUNITY
Start: 2017-12-05

## 2019-01-01 RX ORDER — OMEPRAZOLE 20 MG/1
20 CAPSULE, DELAYED RELEASE ORAL
COMMUNITY
Start: 2017-12-05

## 2019-01-01 RX ORDER — EZETIMIBE 10 MG/1
10 TABLET ORAL
COMMUNITY
Start: 2017-12-05 | End: 2019-01-01 | Stop reason: CLARIF

## 2019-01-01 RX ORDER — PANTOPRAZOLE SODIUM 40 MG/1
40 TABLET, DELAYED RELEASE ORAL DAILY
Qty: 90 TABLET | Refills: 3 | Status: SHIPPED | OUTPATIENT
Start: 2019-01-01 | End: 2019-01-01 | Stop reason: SDUPTHER

## 2019-01-01 RX ORDER — METOPROLOL TARTRATE 50 MG/1
50 TABLET, FILM COATED ORAL
COMMUNITY
Start: 2017-12-05 | End: 2019-01-01 | Stop reason: SDUPTHER

## 2019-01-01 RX ORDER — PREDNISONE 20 MG/1
TABLET ORAL
Qty: 21 TABLET | Refills: 0 | Status: SHIPPED | OUTPATIENT
Start: 2019-01-01

## 2019-01-01 RX ORDER — FUROSEMIDE 20 MG/1
20 TABLET ORAL DAILY
Qty: 90 TABLET | Refills: 3 | Status: SHIPPED | OUTPATIENT
Start: 2019-01-01

## 2019-01-01 RX ORDER — CHOLECALCIFEROL (VITAMIN D3) 125 MCG
TABLET ORAL
COMMUNITY

## 2019-01-01 RX ORDER — METOPROLOL TARTRATE 50 MG/1
50 TABLET, FILM COATED ORAL 2 TIMES DAILY
Qty: 180 TABLET | Refills: 3 | Status: SHIPPED | OUTPATIENT
Start: 2019-01-01 | End: 2019-01-01 | Stop reason: SDUPTHER

## 2019-01-01 RX ORDER — AMOXICILLIN AND CLAVULANATE POTASSIUM 875; 125 MG/1; MG/1
1 TABLET, FILM COATED ORAL 2 TIMES DAILY
Qty: 20 TABLET | Refills: 0 | Status: SHIPPED | OUTPATIENT
Start: 2019-01-01 | End: 2019-01-01 | Stop reason: SDUPTHER

## 2019-01-01 RX ORDER — IRBESARTAN 300 MG/1
300 TABLET ORAL DAILY
Qty: 90 TABLET | Refills: 3 | Status: SHIPPED | OUTPATIENT
Start: 2019-01-01 | End: 2019-01-01 | Stop reason: SDUPTHER

## 2019-01-01 RX ORDER — METOPROLOL TARTRATE 50 MG/1
50 TABLET, FILM COATED ORAL 2 TIMES DAILY
Qty: 180 TABLET | Refills: 3 | Status: SHIPPED | OUTPATIENT
Start: 2019-01-01

## 2019-01-01 ASSESSMENT — PATIENT HEALTH QUESTIONNAIRE - PHQ9
2. FEELING DOWN, DEPRESSED OR HOPELESS: NOT AT ALL
SUM OF ALL RESPONSES TO PHQ9 QUESTIONS 1 AND 2: 0
1. LITTLE INTEREST OR PLEASURE IN DOING THINGS: NOT AT ALL
SUM OF ALL RESPONSES TO PHQ9 QUESTIONS 1 AND 2: 0

## 2019-01-01 ASSESSMENT — ENCOUNTER SYMPTOMS
COUGH: 1
SHORTNESS OF BREATH: 1

## 2020-01-01 ENCOUNTER — DIAGNOSTIC TRANS (OUTPATIENT)
Dept: OTHER | Age: 85
End: 2020-01-01

## 2020-01-01 ENCOUNTER — CASE MANAGEMENT (OUTPATIENT)
Dept: CARE COORDINATION | Age: 85
End: 2020-01-01

## 2020-01-01 ENCOUNTER — HOSPITAL (OUTPATIENT)
Dept: OTHER | Age: 85
End: 2020-01-01
Attending: INTERNAL MEDICINE

## 2020-01-01 ENCOUNTER — TELEPHONE (OUTPATIENT)
Dept: SCHEDULING | Age: 85
End: 2020-01-01

## 2020-01-01 ENCOUNTER — APPOINTMENT (OUTPATIENT)
Dept: FAMILY MEDICINE | Age: 85
End: 2020-01-01

## 2020-01-01 ENCOUNTER — HOSPITAL (OUTPATIENT)
Dept: OTHER | Age: 85
End: 2020-01-01

## 2020-01-01 ENCOUNTER — DOCUMENTATION (OUTPATIENT)
Dept: CARDIOLOGY | Age: 85
End: 2020-01-01

## 2020-01-01 LAB
ALBUMIN SERPL-MCNC: 2.7 G/DL (ref 3.6–5.1)
ALBUMIN SERPL-MCNC: 2.8 G/DL (ref 3.6–5.1)
ALBUMIN/GLOB SERPL: 0.8 {RATIO} (ref 1–2.4)
ALBUMIN/GLOB SERPL: 0.8 {RATIO} (ref 1–2.4)
ALP SERPL-CCNC: 66 UNITS/L (ref 45–117)
ALP SERPL-CCNC: 69 UNITS/L (ref 45–117)
ALT SERPL-CCNC: 27 UNITS/L
ALT SERPL-CCNC: 28 UNITS/L
AMORPH SED URNS QL MICRO: NORMAL
ANALYZER ANC (IANC): ABNORMAL
ANION GAP SERPL CALC-SCNC: 10 MMOL/L (ref 10–20)
ANION GAP SERPL CALC-SCNC: 10 MMOL/L (ref 10–20)
ANION GAP SERPL CALC-SCNC: 12 MMOL/L (ref 10–20)
ANION GAP SERPL CALC-SCNC: 13 MMOL/L (ref 10–20)
ANION GAP SERPL CALC-SCNC: 14 MMOL/L (ref 10–20)
ANION GAP SERPL CALC-SCNC: 9 MMOL/L (ref 10–20)
APPEARANCE UR: CLEAR
AST SERPL-CCNC: 15 UNITS/L
AST SERPL-CCNC: 22 UNITS/L
BACTERIA #/AREA URNS HPF: NORMAL /HPF
BACTERIA BLD CULT: ABNORMAL
BACTERIA BLD CULT: NORMAL
BACTERIA UR CULT: ABNORMAL
BASE DEFICIT BLDA-SCNC: ABNORMAL MMOL/L
BASE EXCESS BLDA CALC-SCNC: 10 MMOL/L (ref 0–3)
BASE EXCESS BLDA CALC-SCNC: 11 MMOL/L (ref 0–3)
BASE EXCESS BLDA CALC-SCNC: 5 MMOL/L (ref 0–3)
BASOPHILS # BLD: 0 K/MCL (ref 0–0.3)
BASOPHILS NFR BLD: 0 %
BDY SITE: ABNORMAL
BILIRUB SERPL-MCNC: 1 MG/DL (ref 0.2–1)
BILIRUB SERPL-MCNC: 1.1 MG/DL (ref 0.2–1)
BILIRUB UR QL: NEGATIVE
BODY TEMPERATURE: 36.3 DEGREES
BODY TEMPERATURE: 37 DEGREES
BODY TEMPERATURE: 37 DEGREES
BUN SERPL-MCNC: 48 MG/DL (ref 6–20)
BUN SERPL-MCNC: 48 MG/DL (ref 6–20)
BUN SERPL-MCNC: 54 MG/DL (ref 6–20)
BUN SERPL-MCNC: 55 MG/DL (ref 6–20)
BUN SERPL-MCNC: 56 MG/DL (ref 6–20)
BUN SERPL-MCNC: 57 MG/DL (ref 6–20)
BUN SERPL-MCNC: 63 MG/DL (ref 6–20)
BUN/CREAT SERPL: 32 (ref 7–25)
BUN/CREAT SERPL: 33 (ref 7–25)
BUN/CREAT SERPL: 37 (ref 7–25)
BUN/CREAT SERPL: 38 (ref 7–25)
BUN/CREAT SERPL: 42 (ref 7–25)
CA-I BLDA-SCNC: 16 % (ref 15–23)
CALCIUM SERPL-MCNC: 7.8 MG/DL (ref 8.4–10.2)
CALCIUM SERPL-MCNC: 8.1 MG/DL (ref 8.4–10.2)
CALCIUM SERPL-MCNC: 8.5 MG/DL (ref 8.4–10.2)
CALCIUM SERPL-MCNC: 8.6 MG/DL (ref 8.4–10.2)
CAOX CRY URNS QL MICRO: NORMAL
CHLORIDE SERPL-SCNC: 100 MMOL/L (ref 98–107)
CHLORIDE SERPL-SCNC: 101 MMOL/L (ref 98–107)
CHLORIDE SERPL-SCNC: 102 MMOL/L (ref 98–107)
CHLORIDE SERPL-SCNC: 103 MMOL/L (ref 98–107)
CO2 SERPL-SCNC: 30 MMOL/L (ref 21–32)
CO2 SERPL-SCNC: 30 MMOL/L (ref 21–32)
CO2 SERPL-SCNC: 31 MMOL/L (ref 21–32)
CO2 SERPL-SCNC: 34 MMOL/L (ref 21–32)
CO2 SERPL-SCNC: 34 MMOL/L (ref 21–32)
CO2 SERPL-SCNC: 35 MMOL/L (ref 21–32)
COHGB MFR BLD: 2.5 %
COLOR UR: YELLOW
CONDITION-RC: ABNORMAL
CONDITION: ABNORMAL
CREAT SERPL-MCNC: 1.28 MG/DL (ref 0.51–0.95)
CREAT SERPL-MCNC: 1.44 MG/DL (ref 0.51–0.95)
CREAT SERPL-MCNC: 1.46 MG/DL (ref 0.51–0.95)
CREAT SERPL-MCNC: 1.5 MG/DL (ref 0.51–0.95)
CREAT SERPL-MCNC: 1.52 MG/DL (ref 0.51–0.95)
CREAT SERPL-MCNC: 1.53 MG/DL (ref 0.51–0.95)
CREAT SERPL-MCNC: 1.67 MG/DL (ref 0.51–0.95)
CREAT SERPL-MCNC: 1.91 MG/DL (ref 0.51–0.95)
D DIMER PPP FEU-MCNC: 4.07 MG/L (FEU)
D DIMER PPP FEU-MCNC: ABNORMAL
DIFFERENTIAL METHOD BLD: ABNORMAL
EOSINOPHIL # BLD: 0.1 K/MCL (ref 0.1–0.5)
EOSINOPHIL NFR BLD: 2 %
EPITH CASTS #/AREA URNS LPF: NORMAL /[LPF]
ERYTHROCYTE [DISTWIDTH] IN BLOOD: 14.8 % (ref 11–15)
ERYTHROCYTE [DISTWIDTH] IN BLOOD: 14.8 % (ref 11–15)
ERYTHROCYTE [DISTWIDTH] IN BLOOD: 14.9 % (ref 11–15)
ERYTHROCYTE [DISTWIDTH] IN BLOOD: 15.3 % (ref 11–15)
FATTY CASTS #/AREA URNS LPF: NORMAL /[LPF]
GLOBULIN SER-MCNC: 3.4 G/DL (ref 2–4)
GLOBULIN SER-MCNC: 3.5 G/DL (ref 2–4)
GLUCOSE BLDC GLUCOMTR-MCNC: 102 MG/DL (ref 70–99)
GLUCOSE BLDC GLUCOMTR-MCNC: 105 MG/DL (ref 70–99)
GLUCOSE BLDC GLUCOMTR-MCNC: 110 MG/DL (ref 70–99)
GLUCOSE BLDC GLUCOMTR-MCNC: 118 MG/DL (ref 70–99)
GLUCOSE BLDC GLUCOMTR-MCNC: 124 MG/DL (ref 70–99)
GLUCOSE BLDC GLUCOMTR-MCNC: 125 MG/DL (ref 70–99)
GLUCOSE BLDC GLUCOMTR-MCNC: 125 MG/DL (ref 70–99)
GLUCOSE BLDC GLUCOMTR-MCNC: 126 MG/DL (ref 70–99)
GLUCOSE BLDC GLUCOMTR-MCNC: 132 MG/DL (ref 70–99)
GLUCOSE BLDC GLUCOMTR-MCNC: 144 MG/DL (ref 70–99)
GLUCOSE BLDC GLUCOMTR-MCNC: 146 MG/DL (ref 70–99)
GLUCOSE BLDC GLUCOMTR-MCNC: 148 MG/DL (ref 70–99)
GLUCOSE BLDC GLUCOMTR-MCNC: 154 MG/DL (ref 70–99)
GLUCOSE BLDC GLUCOMTR-MCNC: 157 MG/DL (ref 70–99)
GLUCOSE BLDC GLUCOMTR-MCNC: 159 MG/DL (ref 70–99)
GLUCOSE BLDC GLUCOMTR-MCNC: 161 MG/DL (ref 70–99)
GLUCOSE BLDC GLUCOMTR-MCNC: 162 MG/DL (ref 70–99)
GLUCOSE BLDC GLUCOMTR-MCNC: 163 MG/DL (ref 70–99)
GLUCOSE BLDC GLUCOMTR-MCNC: 164 MG/DL (ref 70–99)
GLUCOSE BLDC GLUCOMTR-MCNC: 165 MG/DL (ref 70–99)
GLUCOSE BLDC GLUCOMTR-MCNC: 171 MG/DL (ref 70–99)
GLUCOSE BLDC GLUCOMTR-MCNC: 172 MG/DL (ref 70–99)
GLUCOSE BLDC GLUCOMTR-MCNC: 173 MG/DL (ref 70–99)
GLUCOSE BLDC GLUCOMTR-MCNC: 174 MG/DL (ref 70–99)
GLUCOSE BLDC GLUCOMTR-MCNC: 178 MG/DL (ref 70–99)
GLUCOSE BLDC GLUCOMTR-MCNC: 179 MG/DL (ref 70–99)
GLUCOSE BLDC GLUCOMTR-MCNC: 189 MG/DL (ref 70–99)
GLUCOSE BLDC GLUCOMTR-MCNC: 191 MG/DL (ref 70–99)
GLUCOSE BLDC GLUCOMTR-MCNC: 193 MG/DL (ref 70–99)
GLUCOSE BLDC GLUCOMTR-MCNC: 195 MG/DL (ref 70–99)
GLUCOSE BLDC GLUCOMTR-MCNC: 197 MG/DL (ref 70–99)
GLUCOSE BLDC GLUCOMTR-MCNC: 91 MG/DL (ref 70–99)
GLUCOSE BLDC GLUCOMTR-MCNC: ABNORMAL MG/DL
GLUCOSE SERPL-MCNC: 118 MG/DL (ref 65–99)
GLUCOSE SERPL-MCNC: 123 MG/DL (ref 65–99)
GLUCOSE SERPL-MCNC: 128 MG/DL (ref 65–99)
GLUCOSE SERPL-MCNC: 140 MG/DL (ref 65–99)
GLUCOSE SERPL-MCNC: 148 MG/DL (ref 65–99)
GLUCOSE SERPL-MCNC: 186 MG/DL (ref 65–99)
GLUCOSE UR-MCNC: NEGATIVE MG/DL
GRAN CASTS #/AREA URNS LPF: NORMAL /[LPF]
HCO3 BLDA-SCNC: 31 MMOL/L (ref 22–28)
HCO3 BLDA-SCNC: 33 MMOL/L (ref 22–28)
HCO3 BLDA-SCNC: 36 MMOL/L (ref 22–28)
HCT VFR BLD CALC: 33.8 % (ref 36–46.5)
HCT VFR BLD CALC: 35.2 % (ref 36–46.5)
HCT VFR BLD CALC: 36 % (ref 36–46.5)
HCT VFR BLD CALC: 36.2 % (ref 36–46.5)
HCT VFR BLD CALC: 39.5 % (ref 36–46.5)
HGB BLD-MCNC: 10.4 G/DL (ref 12–15.5)
HGB BLD-MCNC: 10.9 G/DL (ref 12–15.5)
HGB BLD-MCNC: 11.6 G/DL (ref 12–15.5)
HGB BLD-MCNC: 11.9 G/DL (ref 12–15.5)
HGB BLD-MCNC: 12 G/DL (ref 12–15.5)
HGB UR QL: NEGATIVE
HOROWITZ INDEX BLD+IHG-RTO: 30 %
HOROWITZ INDEX BLD+IHG-RTO: ABNORMAL MM[HG]
HOROWITZ INDEX BLD+IHG-RTO: ABNORMAL MM[HG]
HYALINE CASTS #/AREA URNS LPF: NORMAL /LPF (ref 0–5)
IMM GRANULOCYTES # BLD AUTO: 0.1 K/MCL (ref 0–0.2)
IMM GRANULOCYTES NFR BLD: 2 %
KETONES UR-MCNC: NEGATIVE MG/DL
LEUKOCYTE ESTERASE UR QL STRIP: NEGATIVE
LYMPHOCYTES # BLD: 0.6 K/MCL (ref 1–4)
LYMPHOCYTES NFR BLD: 11 %
MAGNESIUM SERPL-MCNC: 1.7 MG/DL (ref 1.7–2.4)
MAGNESIUM SERPL-MCNC: 2.2 MG/DL (ref 1.7–2.4)
MAGNESIUM SERPL-MCNC: 2.3 MG/DL (ref 1.7–2.4)
MAGNESIUM SERPL-MCNC: 2.3 MG/DL (ref 1.7–2.4)
MCH RBC QN AUTO: 29.1 PG (ref 26–34)
MCH RBC QN AUTO: 29.9 PG (ref 26–34)
MCH RBC QN AUTO: 30 PG (ref 26–34)
MCH RBC QN AUTO: 30.3 PG (ref 26–34)
MCHC RBC AUTO-ENTMCNC: 30.4 G/DL (ref 32–36.5)
MCHC RBC AUTO-ENTMCNC: 30.8 G/DL (ref 32–36.5)
MCHC RBC AUTO-ENTMCNC: 31 G/DL (ref 32–36.5)
MCHC RBC AUTO-ENTMCNC: 32 G/DL (ref 32–36.5)
MCV RBC AUTO: 94.1 FL (ref 78–100)
MCV RBC AUTO: 94.5 FL (ref 78–100)
MCV RBC AUTO: 97.4 FL (ref 78–100)
MCV RBC AUTO: 98.5 FL (ref 78–100)
METHGB MFR BLD: 0.9 %
MIXED CELL CASTS #/AREA URNS LPF: NORMAL /[LPF]
MONOCYTES # BLD: 0.3 K/MCL (ref 0.3–0.9)
MONOCYTES NFR BLD: 7 %
MRSA DNA SPEC QL NAA+PROBE: NORMAL
MRSA DNA SPEC QL NAA+PROBE: NOT DETECTED
MRSA DNA SPEC QL NAA+PROBE: NOT DETECTED
MUCOUS THREADS URNS QL MICRO: PRESENT
NEUTROPHILS # BLD: 4 K/MCL (ref 1.8–7.7)
NEUTROPHILS NFR BLD: 78 %
NEUTS SEG NFR BLD: ABNORMAL %
NITRITE UR QL: NEGATIVE
NRBC (NRBCRE): 0 /100 WBC
NT-PROBNP SERPL-MCNC: ABNORMAL PG/ML
NT-PROBNP SERPL-MCNC: ABNORMAL PG/ML
OXYHGB MFR BLD: 95 % (ref 94–98)
PAO2 / FIO2 RATIO (RPFR): 187 (ref 300–500)
PAO2 / FIO2 RATIO (RPFR): ABNORMAL
PCO2 BLDA: 30 MM HG (ref 32–45)
PCO2 BLDA: 49 MM HG (ref 32–45)
PCO2 BLDA: 55 MM HG (ref 32–45)
PH BLDA: 7.41 UNITS (ref 7.35–7.45)
PH BLDA: 7.42 UNITS (ref 7.35–7.45)
PH BLDA: 7.64 UNITS (ref 7.35–7.45)
PH UR: 6 UNITS (ref 5–7)
PHOSPHATE SERPL-MCNC: 2.8 MG/DL (ref 2.4–4.7)
PHOSPHATE SERPL-MCNC: 3.3 MG/DL (ref 2.4–4.7)
PLATELET # BLD: 119 K/MCL (ref 140–450)
PLATELET # BLD: 148 K/MCL (ref 140–450)
PLATELET # BLD: 167 K/MCL (ref 140–450)
PLATELET # BLD: 177 K/MCL (ref 140–450)
PO2 BLDA: 53 MM HG (ref 83–108)
PO2 BLDA: 75 MM HG (ref 83–108)
PO2 BLDA: 78 MM HG (ref 83–108)
POTASSIUM SERPL-SCNC: 3.2 MMOL/L (ref 3.4–5.1)
POTASSIUM SERPL-SCNC: 3.2 MMOL/L (ref 3.4–5.1)
POTASSIUM SERPL-SCNC: 3.4 MMOL/L (ref 3.4–5.1)
POTASSIUM SERPL-SCNC: 3.5 MMOL/L (ref 3.4–5.1)
POTASSIUM SERPL-SCNC: 3.5 MMOL/L (ref 3.4–5.1)
POTASSIUM SERPL-SCNC: 3.8 MMOL/L (ref 3.4–5.1)
POTASSIUM SERPL-SCNC: 3.8 MMOL/L (ref 3.4–5.1)
POTASSIUM SERPL-SCNC: 4.2 MMOL/L (ref 3.4–5.1)
PROCALCITONIN SERPL IA-MCNC: <0.05 NG/ML
PROCALCITONIN SERPL IA-MCNC: NORMAL NG/ML
PROT SERPL-MCNC: 6.2 G/DL (ref 6.4–8.2)
PROT SERPL-MCNC: 6.2 G/DL (ref 6.4–8.2)
PROT UR QL: NEGATIVE MG/DL
RBC # BLD: 3.47 MIL/MCL (ref 4–5.2)
RBC # BLD: 3.74 MIL/MCL (ref 4–5.2)
RBC # BLD: 3.83 MIL/MCL (ref 4–5.2)
RBC # BLD: 4.01 MIL/MCL (ref 4–5.2)
RBC #/AREA URNS HPF: NORMAL /HPF (ref 0–2)
RBC CASTS #/AREA URNS LPF: NORMAL /[LPF]
RENAL EPI CELLS #/AREA URNS HPF: NORMAL /[HPF]
SAO2 % BLDA: 98 % (ref 95–99)
SAO2 % BLDA: ABNORMAL %
SAO2 % BLDA: ABNORMAL %
SODIUM SERPL-SCNC: 140 MMOL/L (ref 135–145)
SODIUM SERPL-SCNC: 141 MMOL/L (ref 135–145)
SODIUM SERPL-SCNC: 141 MMOL/L (ref 135–145)
SODIUM SERPL-SCNC: 142 MMOL/L (ref 135–145)
SODIUM SERPL-SCNC: 143 MMOL/L (ref 135–145)
SODIUM SERPL-SCNC: 143 MMOL/L (ref 135–145)
SP GR UR: 1.01 (ref 1–1.03)
SPECIMEN SOURCE: NORMAL
SPERM URNS QL MICRO: NORMAL
SQUAMOUS #/AREA URNS HPF: NORMAL /HPF (ref 0–5)
T VAGINALIS URNS QL MICRO: NORMAL
TRI-PHOS CRY URNS QL MICRO: NORMAL
TROPONIN I SERPL HS-MCNC: <0.02 NG/ML
URATE CRY URNS QL MICRO: NORMAL
URINE REFLEX: NORMAL
URNS CMNT MICRO: NORMAL
UROBILINOGEN UR QL: 0.2 MG/DL (ref 0–1)
VANCOMYCIN TROUGH SERPL-MCNC: 8.7 MCG/ML (ref 10–20)
VANCOMYCIN TROUGH SERPL-MCNC: ABNORMAL UG/ML
WAXY CASTS #/AREA URNS LPF: NORMAL /[LPF]
WBC # BLD: 3.6 K/MCL (ref 4.2–11)
WBC # BLD: 4 K/MCL (ref 4.2–11)
WBC # BLD: 5.1 K/MCL (ref 4.2–11)
WBC # BLD: 5.9 K/MCL (ref 4.2–11)
WBC #/AREA URNS HPF: NORMAL /HPF (ref 0–5)
WBC CASTS #/AREA URNS LPF: NORMAL /[LPF]
YEAST HYPHAE URNS QL MICRO: NORMAL
YEAST URNS QL MICRO: PRESENT

## 2020-01-01 PROCEDURE — 99232 SBSQ HOSP IP/OBS MODERATE 35: CPT | Performed by: INTERNAL MEDICINE

## 2020-01-01 PROCEDURE — 99497 ADVNCD CARE PLAN 30 MIN: CPT | Performed by: NURSE PRACTITIONER

## 2020-01-01 PROCEDURE — 93306 TTE W/DOPPLER COMPLETE: CPT | Performed by: INTERNAL MEDICINE

## 2020-01-01 PROCEDURE — 99223 1ST HOSP IP/OBS HIGH 75: CPT | Performed by: INTERNAL MEDICINE

## 2020-01-01 PROCEDURE — 99233 SBSQ HOSP IP/OBS HIGH 50: CPT | Performed by: INTERNAL MEDICINE

## 2020-01-01 PROCEDURE — 99233 SBSQ HOSP IP/OBS HIGH 50: CPT | Performed by: NURSE PRACTITIONER

## 2020-01-01 PROCEDURE — 99232 SBSQ HOSP IP/OBS MODERATE 35: CPT | Performed by: NURSE PRACTITIONER

## 2020-01-01 PROCEDURE — 99221 1ST HOSP IP/OBS SF/LOW 40: CPT | Performed by: NURSE PRACTITIONER

## 2020-01-01 PROCEDURE — 99221 1ST HOSP IP/OBS SF/LOW 40: CPT | Performed by: INTERNAL MEDICINE

## 2020-01-01 PROCEDURE — 99239 HOSP IP/OBS DSCHRG MGMT >30: CPT | Performed by: INTERNAL MEDICINE

## 2020-01-01 PROCEDURE — 99498 ADVNCD CARE PLAN ADDL 30 MIN: CPT | Performed by: NURSE PRACTITIONER

## 2020-01-01 PROCEDURE — 99231 SBSQ HOSP IP/OBS SF/LOW 25: CPT | Performed by: NURSE PRACTITIONER

## 2020-01-08 ENCOUNTER — SNF VISIT (OUTPATIENT)
Dept: INTERNAL MEDICINE CLINIC | Facility: SKILLED NURSING FACILITY | Age: 85
End: 2020-01-08

## 2020-01-08 DIAGNOSIS — J18.9 PNEUMONIA DUE TO INFECTIOUS ORGANISM, UNSPECIFIED LATERALITY, UNSPECIFIED PART OF LUNG: ICD-10-CM

## 2020-01-08 DIAGNOSIS — A41.9 SEPSIS, DUE TO UNSPECIFIED ORGANISM, UNSPECIFIED WHETHER ACUTE ORGAN DYSFUNCTION PRESENT (HCC): ICD-10-CM

## 2020-01-08 DIAGNOSIS — I10 ESSENTIAL HYPERTENSION: ICD-10-CM

## 2020-01-08 DIAGNOSIS — R53.1 WEAKNESS: ICD-10-CM

## 2020-01-08 PROCEDURE — 99309 SBSQ NF CARE MODERATE MDM 30: CPT | Performed by: NURSE PRACTITIONER

## 2020-01-08 NOTE — PROGRESS NOTES
HPI: Dank Jackson  Is a 79 yo female admitted to [facility] with acute hypoxic respiratory failure due to RLL pneumonia and other findings of atelectasis, gram negative bacteremia. Also with nonspecific colitis/enteritis.  ID consul wheezing, 2L 02  CARDIOVASCULAR: S1S2 RRR  ABDOMEN:  normal active BS+, soft, non distended, nontender   MUSCULOSKELETAL/EXTREMITIES: Trace BLE edema  LINES, TUBES, DRAINS:  PICC ONEIL intact, pt removed her picc LAURI- site intact no bleeding   SKIN: warm, dr

## 2020-03-07 NOTE — ANESTHESIA PREPROCEDURE EVALUATION
Anesthesia PreOp Note    HPI:     Ana Nelson is a 80year old female who presents for preoperative consultation requested by: Beacher Habermann, MD    Date of Surgery: 6/16/2018 - 6/18/2018    Procedure(s):  ENDOSCOPIC RETROGRADE Vahe Fearing Tablet 24 Hr Take 50 mg by mouth daily. Patient takes 1 tab in AM and 1/2 tab at night. Disp:  Rfl:  6/16/2018 at Unknown time   ezetimibe 10 MG Oral Tab Take 10 mg by mouth nightly.  Disp:  Rfl:  6/16/2018 at Unknown time   Zolpidem Tartrate 5 MG Oral Tab prescriptions on file. No Known Allergies    History reviewed. No pertinent family history. Social History  Social History   Marital status:    Spouse name: N/A    Years of education: N/A  Number of children: N/A     Occupational History  None issues    Stress 2011  Negative     EKG 6/18  Sinus tachycardia HR 100s    Neuro/Psych      GI/Hepatic/Renal    (+) chronic renal disease (Acute on chronic renal insuff- Cr 2.5) ARF and CRI,     Comments: Hiatal hernia  gallstone pancreatitis      Endo/Oth No significant past surgical history

## 2020-04-24 ENCOUNTER — DOCUMENTATION (OUTPATIENT)
Dept: FAMILY MEDICINE | Age: 85
End: 2020-04-24

## 2020-04-24 PROBLEM — I25.10 CORONARY ATHEROSCLEROSIS OF NATIVE CORONARY ARTERY: Status: ACTIVE | Noted: 2020-04-24

## 2020-04-24 PROBLEM — H26.9 ANTERIOR SUBCAPSULAR POLAR CATARACT, NONSENILE: Status: ACTIVE | Noted: 2020-04-24

## 2020-04-24 PROBLEM — J18.9 PNEUMONIA DUE TO INFECTIOUS ORGANISM: Status: ACTIVE | Noted: 2020-04-24

## 2020-04-24 PROBLEM — I27.20: Status: ACTIVE | Noted: 2020-04-24

## 2020-04-24 PROBLEM — K44.9 DIAPHRAGMATIC HERNIA WITHOUT OBSTRUCTION OR GANGRENE: Status: ACTIVE | Noted: 2020-04-24

## 2020-04-24 PROBLEM — N18.30 CHRONIC KIDNEY DISEASE, STAGE III (MODERATE) (CMD): Status: ACTIVE | Noted: 2020-04-24

## 2020-04-24 PROBLEM — D64.9 ANEMIA, UNSPECIFIED: Status: ACTIVE | Noted: 2020-04-24

## 2020-04-24 PROBLEM — A41.9 UNSPECIFIED SEPTICEMIA(038.9) (CMD): Status: ACTIVE | Noted: 2020-04-24

## 2020-04-24 PROBLEM — R33.9 RETENTION OF URINE, UNSPECIFIED: Status: ACTIVE | Noted: 2020-04-24

## 2020-04-24 PROBLEM — J96.21 ACUTE ON CHRONIC RESPIRATORY FAILURE WITH HYPOXIA (CMD): Status: ACTIVE | Noted: 2020-04-24

## 2020-04-24 PROBLEM — I50.33 ACUTE ON CHRONIC DIASTOLIC (CONGESTIVE) HEART FAILURE (CMD): Status: ACTIVE | Noted: 2020-04-24

## 2020-04-24 PROBLEM — I50.30 HYPERTENSIVE HEART DISEASE WITH DIASTOLIC HEART FAILURE AND STAGE 4 CHRONIC KIDNEY DISEASE (CMD): Status: ACTIVE | Noted: 2020-04-24

## 2020-04-24 PROBLEM — G47.33 MODERATE OBSTRUCTIVE SLEEP APNEA: Status: ACTIVE | Noted: 2020-04-24

## 2020-04-24 PROBLEM — N17.9 ACUTE KIDNEY FAILURE (CMD): Status: ACTIVE | Noted: 2020-04-24

## 2020-04-24 PROBLEM — I13.0 HYPERTENSIVE HEART DISEASE WITH DIASTOLIC HEART FAILURE AND STAGE 4 CHRONIC KIDNEY DISEASE (CMD): Status: ACTIVE | Noted: 2020-04-24

## 2020-04-24 PROBLEM — M41.24 OTHER IDIOPATHIC SCOLIOSIS, THORACIC REGION: Status: ACTIVE | Noted: 2020-04-24

## 2020-04-24 PROBLEM — R26.81 UNSTEADINESS ON FEET: Status: ACTIVE | Noted: 2020-04-24

## 2020-04-24 PROBLEM — N18.4 HYPERTENSIVE HEART DISEASE WITH DIASTOLIC HEART FAILURE AND STAGE 4 CHRONIC KIDNEY DISEASE (CMD): Status: ACTIVE | Noted: 2020-04-24

## 2020-04-24 PROBLEM — J44.89 OBSTRUCTIVE CHRONIC BRONCHITIS WITHOUT EXACERBATION: Status: ACTIVE | Noted: 2020-04-24

## 2020-04-24 PROBLEM — M81.0 OSTEOPOROSIS, SENILE: Status: ACTIVE | Noted: 2020-04-24

## 2021-01-21 NOTE — PROGRESS NOTES
Spoke with Dr Luz Mins cardiology and recommend starting cardizem drip.   Pt continues to be hemodynamically stable 18

## 2021-05-26 NOTE — PROGRESS NOTES
[facility] Hospitalist Progress Note     CC: Hospital Follow up    PCP: REMI MCCOLLUM DO,        Assessment/Plan:     Principal Problem:    Acute pancreatitis, unspecified complication status, unspecified pancreatitis type  Active Problems:    Acute pancreatitis DC, no further ambien  - likely delirium due to acute infection, medications, hospitalization    AFib with RVR, now nsr  - post procedure on 6/18, dev Afib with rapid rates  - cont po lopressor, irbesartan  - no anticoag at this time due to age, comorbidit 2024 : 135 lb (61.2 kg)      Exam    Gen: calm  Heent: NC AT, mucous memb clear   Pulm: Lungs with diminished BS, mild tachypnea but improved from prior, crackles  CV: Heart with regular rate and rhythm, trace edema   Abd: Abdomen soft tender to palpation, sulfate, acetaminophen, dextrose, HYDROmorphone HCl, Normal Saline Flush, ipratropium-albuterol, Metoclopramide HCl, Normal Saline Flush, ondansetron HCl, hydrALAzine HCl Statement Selected

## 2024-01-01 NOTE — CM/SW NOTE
CTL placed referral to HME for home oxygen    HME will deliver oxygen tank to the patients room    Meño Nails, [address] [facility]  Phone # [phone]
NICKOLAS met with the pt. And her family At bedside. The pt. Lives with her dtr, son in law and and dtr. And granddtrs. In a 2 level home, but resides on the main level. There are 2 steps to enter. The pt.  Reports being independent prior to admission with adl
NICKOLAS notified [facility] of pt's anticipated d/c of today. [address] orders are in.    3:30PM: Family requesting ambulance home due to O2 needs and stairs in the home.  NICKOLAS contacted [facility] for ambulance (O2, max assist) for 430pm.     Luciano Vargas, [address]
NICKOLAS received signed HME/F2F for home O2. NICKOLAS informed Lisbeth/TYLER regarding order. NICKOLAS requested RN/Prisca for O2 stats. Lisbeth/TYLER and DENIS/Tiffanie are currently checking to see if pt has a qualifying dx for the home O2.      Dr. Aristeo Collado informed NICKOLAS that pt's
Progression of care - TPN ordered, tentative cholecystectomy next week. Remains on IV abx. Discharge plan pending course.   Yoshi Ruiz RN, CTL
Pt transferred to PCU following procedure. Pt was seen by therapy who are recommending home health at [place]. Pt is also on new O2. Previously, pt/family were hopeful for her to return home with no needs.  As pt's status has changed, CTL/SW will f/u to discuss
SW follow up w/ family in regards to discharge plans. Family still hopeful that pt is able to return home w/ them and start [place] [address] services w/ Residential. SW to remain available.     Landon Muñoz, [address]
SW met w/ pt's family to follow up on discharge planning. Family is hopeful that pt is able to return home w/ C. Family agreeable w/ [facility] and believe this is the agency they had in the past for pt.  NICKOLAS placed referral.     Pt is currently on O2 - pt does n
Yes

## (undated) DEVICE — SNARE CAPTIFLEX MICRO-OVL OLY

## (undated) DEVICE — Device: Brand: DEFENDO AIR/WATER/SUCTION AND BIOPSY VALVE

## (undated) DEVICE — GUIDEWIRE NOVAGOLD STRGHT TIP

## (undated) DEVICE — RETRIEVAL BALLOON CATHETER: Brand: EXTRACTOR™ PRO XL

## (undated) DEVICE — INSTINCT ENDOSCOPIC HEMOCLIP: Brand: INSTINCT

## (undated) DEVICE — ERCP CANNULA - 5-4-3 TIP: Brand: CONTOUR ERCP CANNULA

## (undated) DEVICE — HOWELL D.A.S.H. SPHINCTEROTOME: Brand: D.A.S.H.

## (undated) DEVICE — REM POLYHESIVE ADULT PATIENT RETURN ELECTRODE: Brand: VALLEYLAB

## (undated) DEVICE — ENDOSCOPY PACK UPPER: Brand: MEDLINE INDUSTRIES, INC.

## (undated) DEVICE — SIDE ARM ADAPTER: Brand: COOK

## (undated) NOTE — LETTER
[address] [facility], [facility] [address]     I agree to have a Peripherally Inserted Central Catheter (PICC) placed in my arm.      1. The PICC insertion procedure, care, maintenance, risks, benefits, and complications Statement of Physician: My signature below affirms that prior to the time of the PICC line insertion, I have explained to the patient and/or his/her legal representative, the risks and benefits involved in the proposed treatment and any reasonable alternat